# Patient Record
Sex: MALE | Race: WHITE | Employment: OTHER | URBAN - NONMETROPOLITAN AREA
[De-identification: names, ages, dates, MRNs, and addresses within clinical notes are randomized per-mention and may not be internally consistent; named-entity substitution may affect disease eponyms.]

---

## 2017-04-13 RX ORDER — TADALAFIL 5 MG
TABLET ORAL
Qty: 90 TABLET | Refills: 2 | Status: SHIPPED | OUTPATIENT
Start: 2017-04-13 | End: 2017-07-14 | Stop reason: SDUPTHER

## 2017-07-14 ENCOUNTER — OFFICE VISIT (OUTPATIENT)
Dept: FAMILY MEDICINE CLINIC | Age: 62
End: 2017-07-14
Payer: COMMERCIAL

## 2017-07-14 VITALS
HEART RATE: 62 BPM | HEIGHT: 72 IN | WEIGHT: 200 LBS | DIASTOLIC BLOOD PRESSURE: 80 MMHG | BODY MASS INDEX: 27.09 KG/M2 | SYSTOLIC BLOOD PRESSURE: 110 MMHG | OXYGEN SATURATION: 97 %

## 2017-07-14 DIAGNOSIS — G61.81 CIDP (CHRONIC INFLAMMATORY DEMYELINATING POLYNEUROPATHY) (HCC): ICD-10-CM

## 2017-07-14 DIAGNOSIS — Z12.11 COLON CANCER SCREENING: ICD-10-CM

## 2017-07-14 DIAGNOSIS — Z00.00 WELLNESS EXAMINATION: Primary | ICD-10-CM

## 2017-07-14 LAB
ABSOLUTE EOS #: 0.1 K/UL (ref 0–0.4)
ABSOLUTE LYMPH #: 1.1 K/UL (ref 1–4.8)
ABSOLUTE MONO #: 0.4 K/UL (ref 0.1–1.2)
ALBUMIN SERPL-MCNC: 4.2 G/DL (ref 3.5–5.2)
ALBUMIN/GLOBULIN RATIO: 1.4 (ref 1–2.5)
ALP BLD-CCNC: 62 U/L (ref 40–129)
ALT SERPL-CCNC: 22 U/L (ref 5–41)
ANION GAP SERPL CALCULATED.3IONS-SCNC: 12 MMOL/L (ref 9–17)
AST SERPL-CCNC: 21 U/L
BASOPHILS # BLD: 1 %
BASOPHILS ABSOLUTE: 0 K/UL (ref 0–0.2)
BILIRUB SERPL-MCNC: 0.83 MG/DL (ref 0.3–1.2)
BUN BLDV-MCNC: 17 MG/DL (ref 8–23)
BUN/CREAT BLD: 18 (ref 9–20)
CALCIUM SERPL-MCNC: 9 MG/DL (ref 8.6–10.4)
CHLORIDE BLD-SCNC: 104 MMOL/L (ref 98–107)
CHOLESTEROL/HDL RATIO: 6.1
CHOLESTEROL: 230 MG/DL
CO2: 26 MMOL/L (ref 20–31)
CREAT SERPL-MCNC: 0.94 MG/DL (ref 0.7–1.2)
DIFFERENTIAL TYPE: NORMAL
EOSINOPHILS RELATIVE PERCENT: 3 %
GFR AFRICAN AMERICAN: >60 ML/MIN
GFR NON-AFRICAN AMERICAN: >60 ML/MIN
GFR SERPL CREATININE-BSD FRML MDRD: NORMAL ML/MIN/{1.73_M2}
GFR SERPL CREATININE-BSD FRML MDRD: NORMAL ML/MIN/{1.73_M2}
GLUCOSE BLD-MCNC: 95 MG/DL (ref 70–99)
HCT VFR BLD CALC: 43.4 % (ref 41–53)
HDLC SERPL-MCNC: 38 MG/DL
HEMOGLOBIN: 15 G/DL (ref 13.5–17.5)
LDL CHOLESTEROL: 152 MG/DL (ref 0–130)
LYMPHOCYTES # BLD: 26 %
MCH RBC QN AUTO: 31.7 PG (ref 26–34)
MCHC RBC AUTO-ENTMCNC: 34.5 G/DL (ref 31–37)
MCV RBC AUTO: 92 FL (ref 80–100)
MONOCYTES # BLD: 8 %
PDW BLD-RTO: 13.8 % (ref 11–14.5)
PLATELET # BLD: 191 K/UL (ref 140–450)
PLATELET ESTIMATE: NORMAL
PMV BLD AUTO: 9 FL (ref 6–12)
POTASSIUM SERPL-SCNC: 4.3 MMOL/L (ref 3.7–5.3)
PROSTATE SPECIFIC ANTIGEN: 2.33 UG/L
RBC # BLD: 4.72 M/UL (ref 4.5–5.9)
RBC # BLD: NORMAL 10*6/UL
SEG NEUTROPHILS: 62 %
SEGMENTED NEUTROPHILS ABSOLUTE COUNT: 2.7 K/UL (ref 1.8–7.7)
SODIUM BLD-SCNC: 142 MMOL/L (ref 135–144)
TOTAL PROTEIN: 7.1 G/DL (ref 6.4–8.3)
TRIGL SERPL-MCNC: 198 MG/DL
VLDLC SERPL CALC-MCNC: ABNORMAL MG/DL (ref 1–30)
WBC # BLD: 4.3 K/UL (ref 3.5–11)
WBC # BLD: NORMAL 10*3/UL

## 2017-07-14 PROCEDURE — 80053 COMPREHEN METABOLIC PANEL: CPT | Performed by: FAMILY MEDICINE

## 2017-07-14 PROCEDURE — 80061 LIPID PANEL: CPT | Performed by: FAMILY MEDICINE

## 2017-07-14 PROCEDURE — 85025 COMPLETE CBC W/AUTO DIFF WBC: CPT | Performed by: FAMILY MEDICINE

## 2017-07-14 PROCEDURE — G0103 PSA SCREENING: HCPCS | Performed by: FAMILY MEDICINE

## 2017-07-14 PROCEDURE — 36415 COLL VENOUS BLD VENIPUNCTURE: CPT | Performed by: FAMILY MEDICINE

## 2017-07-14 PROCEDURE — 99396 PREV VISIT EST AGE 40-64: CPT | Performed by: FAMILY MEDICINE

## 2017-07-14 RX ORDER — VALACYCLOVIR HYDROCHLORIDE 1 G/1
1000 TABLET, FILM COATED ORAL 2 TIMES DAILY
Qty: 14 TABLET | Refills: 12 | Status: SHIPPED | OUTPATIENT
Start: 2017-07-14 | End: 2018-07-25 | Stop reason: SDUPTHER

## 2017-07-14 RX ORDER — TADALAFIL 5 MG/1
TABLET ORAL
Qty: 16 TABLET | Refills: 3 | Status: SHIPPED | OUTPATIENT
Start: 2017-07-14 | End: 2018-07-25 | Stop reason: SDUPTHER

## 2017-07-14 ASSESSMENT — PATIENT HEALTH QUESTIONNAIRE - PHQ9
1. LITTLE INTEREST OR PLEASURE IN DOING THINGS: 0
2. FEELING DOWN, DEPRESSED OR HOPELESS: 0
SUM OF ALL RESPONSES TO PHQ9 QUESTIONS 1 & 2: 0
SUM OF ALL RESPONSES TO PHQ QUESTIONS 1-9: 0

## 2017-07-18 ENCOUNTER — NURSE ONLY (OUTPATIENT)
Dept: LAB | Age: 62
End: 2017-07-18
Payer: COMMERCIAL

## 2017-07-18 DIAGNOSIS — Z12.11 COLON CANCER SCREENING: ICD-10-CM

## 2017-07-18 DIAGNOSIS — Z00.00 WELLNESS EXAMINATION: ICD-10-CM

## 2017-07-18 LAB
-: ABNORMAL
AMORPHOUS: ABNORMAL
BACTERIA: ABNORMAL
BILIRUBIN URINE: NEGATIVE
CASTS UA: ABNORMAL /LPF (ref 0–2)
COLOR: ABNORMAL
COMMENT UA: ABNORMAL
CRYSTALS, UA: ABNORMAL /HPF
DATE, STOOL #1: NORMAL
DATE, STOOL #2: NORMAL
DATE, STOOL #3: NORMAL
EPITHELIAL CELLS UA: ABNORMAL /HPF (ref 0–5)
GLUCOSE URINE: NEGATIVE
HEMOCCULT SP1 STL QL: NEGATIVE
HEMOCCULT SP2 STL QL: NORMAL
HEMOCCULT SP3 STL QL: NORMAL
KETONES, URINE: NEGATIVE
LEUKOCYTE ESTERASE, URINE: NEGATIVE
MUCUS: ABNORMAL
NITRITE, URINE: NEGATIVE
OTHER OBSERVATIONS UA: ABNORMAL
PH UA: 5 (ref 5–6)
PROTEIN UA: NEGATIVE
RBC UA: ABNORMAL /HPF (ref 0–4)
RENAL EPITHELIAL, UA: ABNORMAL /HPF
SPECIFIC GRAVITY UA: 1.03 (ref 1.01–1.02)
TIME, STOOL #1: 700
TIME, STOOL #2: NORMAL
TIME, STOOL #3: NORMAL
TRICHOMONAS: ABNORMAL
TURBIDITY: ABNORMAL
URINE HGB: NEGATIVE
UROBILINOGEN, URINE: NORMAL
WBC UA: ABNORMAL /HPF (ref 0–4)
YEAST: ABNORMAL

## 2017-07-18 PROCEDURE — 81001 URINALYSIS AUTO W/SCOPE: CPT | Performed by: FAMILY MEDICINE

## 2017-07-18 PROCEDURE — 82274 ASSAY TEST FOR BLOOD FECAL: CPT | Performed by: FAMILY MEDICINE

## 2017-07-20 DIAGNOSIS — Z12.11 COLON CANCER SCREENING: Primary | ICD-10-CM

## 2017-12-12 ENCOUNTER — OFFICE VISIT (OUTPATIENT)
Dept: FAMILY MEDICINE CLINIC | Age: 62
End: 2017-12-12
Payer: COMMERCIAL

## 2017-12-12 VITALS
SYSTOLIC BLOOD PRESSURE: 110 MMHG | WEIGHT: 204.6 LBS | BODY MASS INDEX: 27.75 KG/M2 | OXYGEN SATURATION: 96 % | HEART RATE: 91 BPM | DIASTOLIC BLOOD PRESSURE: 68 MMHG

## 2017-12-12 DIAGNOSIS — M72.2 PLANTAR FASCIITIS: Primary | ICD-10-CM

## 2017-12-12 PROCEDURE — 99213 OFFICE O/P EST LOW 20 MIN: CPT | Performed by: FAMILY MEDICINE

## 2017-12-12 RX ORDER — PREDNISONE 10 MG/1
TABLET ORAL
Qty: 20 TABLET | Refills: 0 | Status: SHIPPED | OUTPATIENT
Start: 2017-12-12 | End: 2018-06-15

## 2017-12-12 ASSESSMENT — ENCOUNTER SYMPTOMS
RESPIRATORY NEGATIVE: 1
GASTROINTESTINAL NEGATIVE: 1

## 2017-12-12 NOTE — PATIENT INSTRUCTIONS
stretch    Stretching the plantar fascia and calf muscles can increase flexibility and decrease heel pain. You can do this exercise several times each day and before and after activity. 1. Stand on a step as shown above. Be sure to hold on to the banister. 2. Slowly let your heels down over the edge of the step as you relax your calf muscles. You should feel a gentle stretch across the bottom of your foot and up the back of your leg to your knee. 3. Hold the stretch about 15 to 30 seconds, and then tighten your calf muscle a little to bring your heel back up to the level of the step. Repeat 2 to 4 times. Towel curls    Make this exercise more challenging by placing a weighted object, such as a soup can, on the other end of the towel. 1. While sitting, place your foot on a towel on the floor and scrunch the towel toward you with your toes. 2. Then, also using your toes, push the towel away from you. Willet pickups    1. Put marbles on the floor next to a cup.  2. Using your toes, try to lift the marbles up from the floor and put them in the cup. Follow-up care is a key part of your treatment and safety. Be sure to make and go to all appointments, and call your doctor if you are having problems. It's also a good idea to know your test results and keep a list of the medicines you take. Where can you learn more? Go to [email or web address] org and sign in to your Appota account. Enter U014 in the [facility] box to learn more about \"Plantar Fasciitis: Exercises. \"     If you do not have an account, please click on the \"Sign Up Now\" link. Current as of: March 21, 2017  Content Version: 11.4  © 3201-7291 Healthwise, Incorporated. Care instructions adapted under license by [facility] ([facility]).  If you have questions about a medical condition or this instruction, always ask your healthcare professional. Marcella Wilson any warranty or liability for your use of this

## 2018-06-15 ENCOUNTER — HOSPITAL ENCOUNTER (OUTPATIENT)
Dept: LAB | Age: 63
Setting detail: SPECIMEN
Discharge: HOME | End: 2018-06-15
Payer: COMMERCIAL

## 2018-06-15 ENCOUNTER — OFFICE VISIT (OUTPATIENT)
Dept: FAMILY MEDICINE CLINIC | Age: 63
End: 2018-06-15
Payer: COMMERCIAL

## 2018-06-15 VITALS
SYSTOLIC BLOOD PRESSURE: 108 MMHG | BODY MASS INDEX: 26.19 KG/M2 | WEIGHT: 193.4 LBS | TEMPERATURE: 96.1 F | DIASTOLIC BLOOD PRESSURE: 70 MMHG | HEART RATE: 69 BPM | HEIGHT: 72 IN | OXYGEN SATURATION: 96 %

## 2018-06-15 DIAGNOSIS — R33.9 URINARY RETENTION: Primary | ICD-10-CM

## 2018-06-15 DIAGNOSIS — R33.9 URINARY RETENTION: ICD-10-CM

## 2018-06-15 LAB
-: ABNORMAL
AMORPHOUS: ABNORMAL
BACTERIA: ABNORMAL
BILIRUBIN URINE: NEGATIVE
CASTS UA: ABNORMAL /LPF (ref 0–2)
COLOR: ABNORMAL
COMMENT UA: ABNORMAL
CRYSTALS, UA: ABNORMAL /HPF
EPITHELIAL CELLS UA: ABNORMAL /HPF (ref 0–5)
GLUCOSE URINE: NEGATIVE
KETONES, URINE: NEGATIVE
LEUKOCYTE ESTERASE, URINE: NEGATIVE
MUCUS: ABNORMAL
NITRITE, URINE: NEGATIVE
OTHER OBSERVATIONS UA: ABNORMAL
PH UA: 5 (ref 5–6)
PROTEIN UA: NEGATIVE
RBC UA: ABNORMAL /HPF (ref 0–4)
RENAL EPITHELIAL, UA: ABNORMAL /HPF
SPECIFIC GRAVITY UA: 1.03 (ref 1.01–1.02)
TRICHOMONAS: ABNORMAL
TURBIDITY: ABNORMAL
URINE HGB: NEGATIVE
UROBILINOGEN, URINE: NORMAL
WBC UA: ABNORMAL /HPF (ref 0–4)
YEAST: ABNORMAL

## 2018-06-15 PROCEDURE — 99213 OFFICE O/P EST LOW 20 MIN: CPT | Performed by: FAMILY MEDICINE

## 2018-06-15 PROCEDURE — 81001 URINALYSIS AUTO W/SCOPE: CPT

## 2018-06-15 RX ORDER — TAMSULOSIN HYDROCHLORIDE 0.4 MG/1
CAPSULE ORAL
Refills: 0 | COMMUNITY
Start: 2018-06-10 | End: 2018-07-11 | Stop reason: SDUPTHER

## 2018-06-15 ASSESSMENT — ENCOUNTER SYMPTOMS
GASTROINTESTINAL NEGATIVE: 1
RESPIRATORY NEGATIVE: 1

## 2018-07-11 ENCOUNTER — OFFICE VISIT (OUTPATIENT)
Dept: UROLOGY | Age: 63
End: 2018-07-11
Payer: COMMERCIAL

## 2018-07-11 VITALS
HEART RATE: 68 BPM | DIASTOLIC BLOOD PRESSURE: 60 MMHG | SYSTOLIC BLOOD PRESSURE: 104 MMHG | WEIGHT: 197 LBS | BODY MASS INDEX: 26.68 KG/M2 | HEIGHT: 72 IN

## 2018-07-11 DIAGNOSIS — N13.8 BPH WITH OBSTRUCTION/LOWER URINARY TRACT SYMPTOMS: ICD-10-CM

## 2018-07-11 DIAGNOSIS — R39.9 LOWER URINARY TRACT SYMPTOMS (LUTS): ICD-10-CM

## 2018-07-11 DIAGNOSIS — R33.9 INCOMPLETE BLADDER EMPTYING: ICD-10-CM

## 2018-07-11 DIAGNOSIS — R35.0 URINARY FREQUENCY: Primary | ICD-10-CM

## 2018-07-11 DIAGNOSIS — N40.1 BPH WITH OBSTRUCTION/LOWER URINARY TRACT SYMPTOMS: ICD-10-CM

## 2018-07-11 PROCEDURE — 51798 US URINE CAPACITY MEASURE: CPT | Performed by: UROLOGY

## 2018-07-11 PROCEDURE — 99204 OFFICE O/P NEW MOD 45 MIN: CPT | Performed by: UROLOGY

## 2018-07-11 RX ORDER — TAMSULOSIN HYDROCHLORIDE 0.4 MG/1
CAPSULE ORAL
Qty: 90 CAPSULE | Refills: 3 | Status: SHIPPED | OUTPATIENT
Start: 2018-07-11 | End: 2019-01-16 | Stop reason: SDUPTHER

## 2018-07-11 NOTE — PROGRESS NOTES
peridontal    OTHER SURGICAL HISTORY      IVIG INFUSIONS STARTING NOVEMBER OF 2015 AND EVERY 3 MONTH SINCE    TOE SURGERY       Family History   Problem Relation Age of Onset    Heart Disease Mother     Cancer Mother         lung    Cancer Father         lung    Glaucoma Neg Hx      Outpatient Prescriptions Marked as Taking for the 7/11/18 encounter (Office Visit) with Hillary Arnold MD   Medication Sig Dispense Refill    tamsulosin (FLOMAX) 0.4 MG capsule TAKE 1 CAPSULE ONCE A DAY 90 capsule 3    valACYclovir (VALTREX) 1 g tablet Take 1 tablet by mouth 2 times daily 14 tablet 12    tadalafil (CIALIS) 5 MG tablet TAKE 1 TABLET DAILY as needed 16 tablet 3    b complex vitamins capsule Take 1 capsule by mouth daily.  Ascorbic Acid (VITAMIN C) 250 MG tablet Take 500 mg by mouth daily.  Omega-3 Fatty Acids (FISH OIL) 1000 MG CAPS Take 1,000 mg by mouth daily.  Vitamin D (CHOLECALCIFEROL) 1000 UNITS CAPS capsule Take 1,000 Units by mouth daily.  vitamin B-12 (CYANOCOBALAMIN) 1000 MCG tablet Take 1,000 mcg by mouth daily. Patient has no known allergies. History   Smoking Status    Former Smoker    Packs/day: 1.00    Years: 25.00    Types: Cigarettes    Quit date: 1/1/2005   Smokeless Tobacco    Never Used     Comment: smoked for 25 years, stopped 2005. 1 pack daily       History   Alcohol Use    4.2 oz/week    7 Cans of beer per week     Comment: 1 daily       REVIEW OF SYSTEMS:  Constitutional: negative  Eyes: negative  Respiratory: negative  Cardiovascular: negative  Gastrointestinal: negative  Musculoskeletal: negative  Genitourinary: negative except for what is in HPI  Skin: negative   Neurological: negative  Hematological/Lymphatic: negative  Psychological: negative    Physical Exam:    This a 58 y.o. male   Vitals:    07/11/18 1422   BP: 104/60   Pulse: 68     Constitutional: Patient in no acute distress; Neuro: alert and oriented to person place and time. Psych: Mood and affect normal.  Skin: Normal  Lungs: Respiratory effort normal  Cardiovascular:  Normal peripheral pulses  Abdomen: Soft, non-tender, non-distended with no CVA, flank pain, hepatosplenomegaly or hernia. Kidneys normal.  Bladder non-tender and not distended. Lymphatics: no palpable lymphadenopathy  Penis normal and circumcised  Urethral meatus normal  Scrotal exam normal  Testicles normal bilaterally      Assessment and Plan      1. Urinary frequency    2. Incomplete bladder emptying    3. BPH with obstruction/lower urinary tract symptoms    4. Lower urinary tract symptoms (LUTS)           Plan:       PVR today is zero  Continue flomax for BPH, refill  Check PSA  Return in about 6 months (around 1/11/2019) for for PSA check.            Rachel Busch MD  [facility]

## 2018-07-16 ENCOUNTER — OFFICE VISIT (OUTPATIENT)
Dept: FAMILY MEDICINE CLINIC | Age: 63
End: 2018-07-16
Payer: COMMERCIAL

## 2018-07-16 VITALS
OXYGEN SATURATION: 98 % | WEIGHT: 191.2 LBS | BODY MASS INDEX: 25.9 KG/M2 | HEART RATE: 63 BPM | SYSTOLIC BLOOD PRESSURE: 104 MMHG | DIASTOLIC BLOOD PRESSURE: 72 MMHG | HEIGHT: 72 IN

## 2018-07-16 DIAGNOSIS — Z00.00 WELLNESS EXAMINATION: Primary | ICD-10-CM

## 2018-07-16 PROCEDURE — 99396 PREV VISIT EST AGE 40-64: CPT | Performed by: FAMILY MEDICINE

## 2018-07-16 PROCEDURE — 93000 ELECTROCARDIOGRAM COMPLETE: CPT | Performed by: INTERNAL MEDICINE

## 2018-07-16 NOTE — PROGRESS NOTES
Comment: 1 daily    Drug use: No    Sexual activity: Not on file      Comment: Unmarried.  x2. 2 children. Other Topics Concern    Not on file     Social History Narrative    No narrative on file       Review of Systems:  Urine flow issues  Occasional neck pain  Right hand dysfunction   Right foot drop    Physical Exam:   Vitals:    07/16/18 0915   BP: 104/72   Site: Right Arm   Position: Sitting   Cuff Size: Large Adult   Pulse: 63   SpO2: 98%   Weight: 191 lb 3.2 oz (86.7 kg)   Height: 5' 11.5\" (1.816 m)     Body mass index is 26.3 kg/m². Constitutional: He is oriented to person, place, and time. He appears well-developed and well-nourished. No distress. HEENT:   Head: Normocephalic and atraumatic. Right Ear: Tympanic membrane, external ear and ear canal normal.   Left Ear: Tympanic membrane, external ear and ear canal normal.   Nose: Nose normal.   Mouth/Throat: Oropharynx is clear and moist and mucous membranes are normal. No oropharyngeal exudate or posterior oropharyngeal erythema. He has no cervical adenopathy. Eyes: Conjunctivae and extraocular motions are normal. Pupils are equal, round, and reactive to light. Neck: Full passive range of motion without pain. Neck supple. No JVD present. Carotid bruit is not present. No mass and no thyromegaly present. Cardiovascular: Normal rate, regular rhythm, normal heart sounds and intact distal pulses. Exam reveals no gallop and no friction rub. No murmur heard. Pulmonary/Chest: Effort normal and breath sounds normal. No respiratory distress. He has no wheezes, rhonchi or rales. Abdominal: Soft, non-tender. Bowel sounds and aorta are normal. There is no organomegaly, mass or bruit. Genitourinary:  Per urology . Musculoskeletal: Normal range of motion, no synovitis. He exhibits no edema. Neurological: He is alert and oriented to person, place, and time. He has normal reflexes. No cranial nerve deficit.  Coordination normal.   Skin: Skin is warm, dry and intact. No suspicious lesions are noted. Psychiatric: He has a normal mood and affect.  His speech is normal and behavior is normal. Judgment, cognition and memory are normal.     Assessment/Plan:    Patient Active Problem List   Diagnosis    Footdrop    Hyperlipidemia    Ulnar neuropathy of right upper extremity    Cervical disc disorder    ED (erectile dysfunction)    Herpes simplex labialis    Benign prostatic hyperplasia with lower urinary tract symptoms    Right brachial plexitis    CIDP (chronic inflammatory demyelinating polyneuropathy) ([facility] [address]       Labs per orders  Informed of shingles shot  To continue to work on healthy life style  As long as well follow up 1 year

## 2018-07-16 NOTE — PATIENT INSTRUCTIONS
Patient Education        Shingles Vaccine: What You Need to Know  What is shingles? Shingles is a painful skin rash, often with blisters. It is also called herpes zoster, or just zoster. A shingles rash usually appears on one side of the face or body and lasts from 2 to 4 weeks. Its main symptom is pain, which can be quite severe. Other symptoms of shingles can include fever, headache, chills and upset stomach. Very rarely, a shingles infection can lead to pneumonia, hearing problems, blindness, brain inflammation (encephalitis) or death. For about 1 person in 5, severe pain can continue even long after the rash clears up. This is called post-herpetic neuralgia. Shingles is caused by the varicella zoster virus, the same virus that causes chickenpox. Only someone who has had chickenpox-or, rarely, has gotten chickenpox vaccine-can get shingles. The virus stays in your body, and can cause shingles many years later. You can't catch shingles from another person with shingles. However, a person who has never had chickenpox (or chickenpox vaccine) could get chickenpox from someone with shingles. This is not very common. Shingles is far more common in people 48years of age and older than in younger people. It is also more common in people whose immune systems are weakened because of a disease such as cancer, or drugs such as steroids or chemotherapy. At least 1 million people a year in the [facility] get shingles. Shingles vaccine  A vaccine for shingles was licensed in 7490. In clinical trials, the vaccine reduced the risk of shingles by 50%. It can also reduce pain in people who still get shingles after being vaccinated. A single dose of shingles vaccine is recommended for adults 61years of age and older.   Some people should not get shingles vaccine or should wait  A person should not get shingles vaccine who:  · Has ever had a life-threatening allergic reaction to gelatin, the antibiotic neomycin, or any other component of shingles vaccine. Tell your doctor if you have any severe allergies. · Has a weakened immune system because of current:  ¨ AIDS or another disease that affects the immune system. ¨ Treatment with drugs that affect the immune system, such as prolonged use of high-dose steroids. ¨ Cancer treatment such as radiation or chemotherapy. ¨ Cancer affecting the bone marrow or lymphatic system, such as leukemia or lymphoma. · Is pregnant, or might be pregnant. Women should not become pregnant until at least 4 weeks after getting shingles vaccine. Someone with a minor acute illness, such as a cold, may be vaccinated. But anyone with a moderate or severe acute illness should usually wait until they recover before getting the vaccine. This includes anyone with a temperature of 101.3° F or higher. What are the risks from shingles vaccine? A vaccine, like any medicine, could possibly cause serious problems, such as severe allergic reactions. However, the risk of a vaccine causing serious harm, or death, is extremely small. No serious problems have been identified with shingles vaccine. Mild problems  · Redness, soreness, swelling, or itching at the site of the injection (about 1 person in 3)  · Headache (about 1 person in 70)  Like all vaccines, shingles vaccine is being closely monitored for unusual or severe problems. What if there is a serious reaction? What should I look for? · Look for anything that concerns you, such as signs of a severe allergic reaction, very high fever, or behavior changes. Signs of a severe allergic reaction can include hives, swelling of the face and throat, difficulty breathing, a fast heartbeat, dizziness, and weakness. These would start a few minutes to a few hours after the vaccination. What should I do?   · If you think it is a severe allergic reaction or other emergency that can't wait, call 9-1-1 or get the person to the nearest hospital. Otherwise, call your medicines you take. How can you care for yourself at home? · Do not eat too much sugar, fat, or fast foods. You can still have dessert and treats now and then. The goal is moderation. · Start small to improve your eating habits. Pay attention to portion sizes, drink less juice and soda pop, and eat more fruits and vegetables. ¨ Eat a healthy amount of food. A 3-ounce serving of meat, for example, is about the size of a deck of cards. Fill the rest of your plate with vegetables and whole grains. ¨ Limit the amount of soda and sports drinks you have every day. Drink more water when you are thirsty. ¨ Eat at least 5 servings of fruits and vegetables every day. It may seem like a lot, but it is not hard to reach this goal. A serving or helping is 1 piece of fruit, 1 cup of vegetables, or 2 cups of leafy, raw vegetables. Have an apple or some carrot sticks as an afternoon snack instead of a candy bar. Try to have fruits and/or vegetables at every meal.  · Make exercise part of your daily routine. You may want to start with simple activities, such as walking, bicycling, or slow swimming. Try to be active 30 to 60 minutes every day. You do not need to do all 30 to 60 minutes all at once. For example, you can exercise 3 times a day for 10 or 20 minutes. Moderate exercise is safe for most people, but it is always a good idea to talk to your doctor before starting an exercise program.  · Keep moving. Ivon [employer] the lawn, work in the garden, or iFrat Wars. Take the stairs instead of the elevator at work. · If you smoke, quit. People who smoke have an increased risk for heart attack, stroke, cancer, and other lung illnesses. Quitting is hard, but there are ways to boost your chance of quitting tobacco for good. ¨ Use nicotine gum, patches, or lozenges. ¨ Ask your doctor about stop-smoking programs and medicines. ¨ Keep trying.   In addition to reducing your risk of diseases in the future, you will notice some benefits soon after you stop using tobacco. If you have shortness of breath or asthma symptoms, they will likely get better within a few weeks after you quit. · Limit how much alcohol you drink. Moderate amounts of alcohol (up to 2 drinks a day for men, 1 drink a day for women) are okay. But drinking too much can lead to liver problems, high blood pressure, and other health problems. Family health  If you have a family, there are many things you can do together to improve your health. · Eat meals together as a family as often as possible. · Eat healthy foods. This includes fruits, vegetables, lean meats and dairy, and whole grains. · Include your family in your fitness plan. Most people think of activities such as jogging or tennis as the way to fitness, but there are many ways you and your family can be more active. Anything that makes you breathe hard and gets your heart pumping is exercise. Here are some tips:  ¨ Walk to do errands or to take your child to school or the bus. ¨ Go for a family bike ride after dinner instead of watching TV. Where can you learn more? Go to https://SoothEase.AudioTag. org and sign in to your CicerOOs account. Enter X749 in the [facility] box to learn more about \"A Healthy Lifestyle: Care Instructions. \"     If you do not have an account, please click on the \"Sign Up Now\" link. Current as of: December 7, 2017  Content Version: 11.6  © 2576-9597 JumpChat, [facility]. Care instructions adapted under license by [facility] ([facility]). If you have questions about a medical condition or this instruction, always ask your healthcare professional. [address] any warranty or liability for your use of this information.

## 2018-07-18 ENCOUNTER — TELEPHONE (OUTPATIENT)
Dept: FAMILY MEDICINE CLINIC | Age: 63
End: 2018-07-18

## 2018-07-18 ENCOUNTER — HOSPITAL ENCOUNTER (OUTPATIENT)
Dept: LAB | Age: 63
Setting detail: SPECIMEN
Discharge: HOME | End: 2018-07-18
Payer: COMMERCIAL

## 2018-07-18 DIAGNOSIS — Z12.11 SCREEN FOR COLON CANCER: ICD-10-CM

## 2018-07-18 DIAGNOSIS — Z12.11 SCREEN FOR COLON CANCER: Primary | ICD-10-CM

## 2018-07-18 DIAGNOSIS — Z00.00 WELLNESS EXAMINATION: ICD-10-CM

## 2018-07-18 LAB
ABSOLUTE EOS #: 0.1 K/UL (ref 0–0.4)
ABSOLUTE IMMATURE GRANULOCYTE: NORMAL K/UL (ref 0–0.3)
ABSOLUTE LYMPH #: 1.1 K/UL (ref 1–4.8)
ABSOLUTE MONO #: 0.3 K/UL (ref 0.1–1.2)
ANION GAP SERPL CALCULATED.3IONS-SCNC: 9 MMOL/L (ref 9–17)
BASOPHILS # BLD: 1 % (ref 0–2)
BASOPHILS ABSOLUTE: 0 K/UL (ref 0–0.2)
BUN BLDV-MCNC: 15 MG/DL (ref 8–23)
BUN/CREAT BLD: 15 (ref 9–20)
CALCIUM SERPL-MCNC: 9 MG/DL (ref 8.6–10.4)
CHLORIDE BLD-SCNC: 103 MMOL/L (ref 98–107)
CHOLESTEROL/HDL RATIO: 4.7
CHOLESTEROL: 204 MG/DL
CO2: 29 MMOL/L (ref 20–31)
CREAT SERPL-MCNC: 1.01 MG/DL (ref 0.7–1.2)
DATE, STOOL #1: NORMAL
DATE, STOOL #2: NORMAL
DATE, STOOL #3: NORMAL
DIFFERENTIAL TYPE: NORMAL
EOSINOPHILS RELATIVE PERCENT: 2 % (ref 1–8)
GFR AFRICAN AMERICAN: >60 ML/MIN
GFR NON-AFRICAN AMERICAN: >60 ML/MIN
GFR SERPL CREATININE-BSD FRML MDRD: ABNORMAL ML/MIN/{1.73_M2}
GFR SERPL CREATININE-BSD FRML MDRD: ABNORMAL ML/MIN/{1.73_M2}
GLUCOSE BLD-MCNC: 105 MG/DL (ref 70–99)
HCT VFR BLD CALC: 43 % (ref 41–53)
HDLC SERPL-MCNC: 43 MG/DL
HEMOCCULT SP1 STL QL: NEGATIVE
HEMOCCULT SP2 STL QL: NORMAL
HEMOCCULT SP3 STL QL: NORMAL
HEMOGLOBIN: 14.9 G/DL (ref 13.5–17.5)
IMMATURE GRANULOCYTES: NORMAL %
LDL CHOLESTEROL: 145 MG/DL (ref 0–130)
LYMPHOCYTES # BLD: 27 % (ref 15–43)
MCH RBC QN AUTO: 32.8 PG (ref 26–34)
MCHC RBC AUTO-ENTMCNC: 34.7 G/DL (ref 31–37)
MCV RBC AUTO: 94.7 FL (ref 80–100)
MONOCYTES # BLD: 7 % (ref 6–14)
NRBC AUTOMATED: NORMAL PER 100 WBC
PDW BLD-RTO: 13.1 % (ref 11–14.5)
PLATELET # BLD: 206 K/UL (ref 140–450)
PLATELET ESTIMATE: NORMAL
PMV BLD AUTO: 8.9 FL (ref 6–12)
POTASSIUM SERPL-SCNC: 4 MMOL/L (ref 3.7–5.3)
PROSTATE SPECIFIC ANTIGEN: 2.59 UG/L
RBC # BLD: 4.54 M/UL (ref 4.5–5.9)
RBC # BLD: NORMAL 10*6/UL
SEG NEUTROPHILS: 63 % (ref 44–74)
SEGMENTED NEUTROPHILS ABSOLUTE COUNT: 2.5 K/UL (ref 1.8–7.7)
SODIUM BLD-SCNC: 141 MMOL/L (ref 135–144)
TIME, STOOL #1: 700
TIME, STOOL #2: NORMAL
TIME, STOOL #3: NORMAL
TRIGL SERPL-MCNC: 80 MG/DL
VLDLC SERPL CALC-MCNC: ABNORMAL MG/DL (ref 1–30)
WBC # BLD: 3.9 K/UL (ref 3.5–11)
WBC # BLD: NORMAL 10*3/UL

## 2018-07-18 PROCEDURE — 85025 COMPLETE CBC W/AUTO DIFF WBC: CPT

## 2018-07-18 PROCEDURE — G0103 PSA SCREENING: HCPCS

## 2018-07-18 PROCEDURE — 82274 ASSAY TEST FOR BLOOD FECAL: CPT

## 2018-07-18 PROCEDURE — 36415 COLL VENOUS BLD VENIPUNCTURE: CPT

## 2018-07-18 PROCEDURE — 80048 BASIC METABOLIC PNL TOTAL CA: CPT

## 2018-07-18 PROCEDURE — 80061 LIPID PANEL: CPT

## 2018-07-25 ENCOUNTER — TELEPHONE (OUTPATIENT)
Dept: FAMILY MEDICINE CLINIC | Age: 63
End: 2018-07-25

## 2018-07-25 RX ORDER — TADALAFIL 5 MG/1
TABLET ORAL
Qty: 16 TABLET | Refills: 3 | Status: SHIPPED | OUTPATIENT
Start: 2018-07-25 | End: 2018-08-01 | Stop reason: SDUPTHER

## 2018-07-25 RX ORDER — VALACYCLOVIR HYDROCHLORIDE 1 G/1
1000 TABLET, FILM COATED ORAL 2 TIMES DAILY
Qty: 14 TABLET | Refills: 12 | Status: SHIPPED | OUTPATIENT
Start: 2018-07-25 | End: 2019-07-18 | Stop reason: SDUPTHER

## 2018-07-31 ENCOUNTER — TELEPHONE (OUTPATIENT)
Dept: FAMILY MEDICINE CLINIC | Age: 63
End: 2018-07-31

## 2018-07-31 NOTE — TELEPHONE ENCOUNTER
Patient stopped at the window with the attached paper requesting that his Cialis (only) script be sent to the [facility].

## 2018-08-01 RX ORDER — TADALAFIL 5 MG/1
TABLET ORAL
Qty: 16 TABLET | Refills: 3 | Status: SHIPPED | OUTPATIENT
Start: 2018-08-01 | End: 2019-07-18 | Stop reason: SDUPTHER

## 2018-08-10 ENCOUNTER — TELEPHONE (OUTPATIENT)
Dept: FAMILY MEDICINE CLINIC | Age: 63
End: 2018-08-10

## 2019-01-16 ENCOUNTER — OFFICE VISIT (OUTPATIENT)
Dept: UROLOGY | Age: 64
End: 2019-01-16
Payer: COMMERCIAL

## 2019-01-16 VITALS
HEIGHT: 71 IN | HEART RATE: 57 BPM | OXYGEN SATURATION: 96 % | WEIGHT: 191.2 LBS | DIASTOLIC BLOOD PRESSURE: 62 MMHG | SYSTOLIC BLOOD PRESSURE: 118 MMHG | BODY MASS INDEX: 26.77 KG/M2

## 2019-01-16 DIAGNOSIS — N40.1 BPH WITH OBSTRUCTION/LOWER URINARY TRACT SYMPTOMS: ICD-10-CM

## 2019-01-16 DIAGNOSIS — R35.0 URINARY FREQUENCY: ICD-10-CM

## 2019-01-16 DIAGNOSIS — R39.9 LOWER URINARY TRACT SYMPTOMS (LUTS): ICD-10-CM

## 2019-01-16 DIAGNOSIS — R33.9 INCOMPLETE BLADDER EMPTYING: Primary | ICD-10-CM

## 2019-01-16 DIAGNOSIS — N52.01 ERECTILE DYSFUNCTION DUE TO ARTERIAL INSUFFICIENCY: ICD-10-CM

## 2019-01-16 DIAGNOSIS — N13.8 BPH WITH OBSTRUCTION/LOWER URINARY TRACT SYMPTOMS: ICD-10-CM

## 2019-01-16 PROCEDURE — 99214 OFFICE O/P EST MOD 30 MIN: CPT | Performed by: UROLOGY

## 2019-01-16 RX ORDER — TAMSULOSIN HYDROCHLORIDE 0.4 MG/1
CAPSULE ORAL
Qty: 90 CAPSULE | Refills: 3 | Status: SHIPPED | OUTPATIENT
Start: 2019-01-16 | End: 2019-07-17 | Stop reason: SDUPTHER

## 2019-07-17 ENCOUNTER — OFFICE VISIT (OUTPATIENT)
Dept: UROLOGY | Age: 64
End: 2019-07-17
Payer: COMMERCIAL

## 2019-07-17 VITALS
HEIGHT: 71 IN | WEIGHT: 187.2 LBS | DIASTOLIC BLOOD PRESSURE: 62 MMHG | BODY MASS INDEX: 26.21 KG/M2 | SYSTOLIC BLOOD PRESSURE: 98 MMHG | HEART RATE: 56 BPM

## 2019-07-17 DIAGNOSIS — N52.01 ERECTILE DYSFUNCTION DUE TO ARTERIAL INSUFFICIENCY: ICD-10-CM

## 2019-07-17 DIAGNOSIS — N40.1 BPH WITH OBSTRUCTION/LOWER URINARY TRACT SYMPTOMS: ICD-10-CM

## 2019-07-17 DIAGNOSIS — R35.0 URINARY FREQUENCY: ICD-10-CM

## 2019-07-17 DIAGNOSIS — R39.9 LOWER URINARY TRACT SYMPTOMS (LUTS): ICD-10-CM

## 2019-07-17 DIAGNOSIS — R33.9 INCOMPLETE BLADDER EMPTYING: Primary | ICD-10-CM

## 2019-07-17 DIAGNOSIS — N13.8 BPH WITH OBSTRUCTION/LOWER URINARY TRACT SYMPTOMS: ICD-10-CM

## 2019-07-17 PROCEDURE — 99214 OFFICE O/P EST MOD 30 MIN: CPT | Performed by: UROLOGY

## 2019-07-17 RX ORDER — TAMSULOSIN HYDROCHLORIDE 0.4 MG/1
CAPSULE ORAL
Qty: 90 CAPSULE | Refills: 3 | Status: SHIPPED | OUTPATIENT
Start: 2019-07-17 | End: 2020-03-04 | Stop reason: SDUPTHER

## 2019-07-17 NOTE — PROGRESS NOTES
Vitals:    07/17/19 1002   BP: 98/62   Pulse: 56     Constitutional: Patient in no acute distress; Neuro: alert and oriented to person place and time. Psych: Mood and affect normal.  Skin: Normal        Assessment and Plan      1. Incomplete bladder emptying    2. Urinary frequency    3. BPH with obstruction/lower urinary tract symptoms    4. Lower urinary tract symptoms (LUTS)    5. Erectile dysfunction due to arterial insufficiency           Plan:         Continue flomax for BPH  ED- on cialis PRN, working well for him. PSA to be drawn tomorrow, and again in 6 months  Return in about 6 months (around 1/17/2020).            Irineo Oliveira MD  [facility]

## 2019-07-18 ENCOUNTER — OFFICE VISIT (OUTPATIENT)
Dept: FAMILY MEDICINE CLINIC | Age: 64
End: 2019-07-18
Payer: COMMERCIAL

## 2019-07-18 VITALS
WEIGHT: 184 LBS | DIASTOLIC BLOOD PRESSURE: 60 MMHG | HEIGHT: 72 IN | BODY MASS INDEX: 24.92 KG/M2 | SYSTOLIC BLOOD PRESSURE: 100 MMHG | HEART RATE: 68 BPM | OXYGEN SATURATION: 96 %

## 2019-07-18 DIAGNOSIS — Z00.00 WELLNESS EXAMINATION: Primary | ICD-10-CM

## 2019-07-18 PROCEDURE — 99397 PER PM REEVAL EST PAT 65+ YR: CPT | Performed by: FAMILY MEDICINE

## 2019-07-18 RX ORDER — TADALAFIL 5 MG/1
TABLET ORAL
Qty: 16 TABLET | Refills: 3 | Status: SHIPPED | OUTPATIENT
Start: 2019-07-18 | End: 2020-08-17 | Stop reason: SDUPTHER

## 2019-07-18 RX ORDER — VALACYCLOVIR HYDROCHLORIDE 1 G/1
1000 TABLET, FILM COATED ORAL 2 TIMES DAILY
Qty: 14 TABLET | Refills: 12 | Status: SHIPPED | OUTPATIENT
Start: 2019-07-18 | End: 2020-08-17 | Stop reason: SDUPTHER

## 2019-07-18 ASSESSMENT — PATIENT HEALTH QUESTIONNAIRE - PHQ9
1. LITTLE INTEREST OR PLEASURE IN DOING THINGS: 0
SUM OF ALL RESPONSES TO PHQ9 QUESTIONS 1 & 2: 0
2. FEELING DOWN, DEPRESSED OR HOPELESS: 0
SUM OF ALL RESPONSES TO PHQ QUESTIONS 1-9: 0
SUM OF ALL RESPONSES TO PHQ QUESTIONS 1-9: 0

## 2019-07-18 NOTE — PATIENT INSTRUCTIONS
[email or web address] and sign in to your FilmBreakt account. Enter I083 in the [employer] box to learn more about \"Learning About Physical Activity. \"     If you do not have an account, please click on the \"Sign Up Now\" link. Current as of: August 19, 2018  Content Version: 12.0  © 6959-3943 Healthwise, Incorporated. Care instructions adapted under license by [facility] ([facility] If you have questions about a medical condition or this instruction, always ask your healthcare professional. [address] any warranty or liability for your use of this information.

## 2019-07-18 NOTE — PROGRESS NOTES
pHistory and Physical      Srinivasan Overton  YOB: 1955    Date of Service:  7/18/2019    Chief Complaint:   Srinivasan Overton is a 71 y.o. male who presents for complete physical examination.     HPI: status is stable    Wt Readings from Last 3 Encounters:   07/18/19 184 lb (83.5 kg)   07/17/19 187 lb 3.2 oz (84.9 kg)   01/16/19 191 lb 3.2 oz (86.7 kg)     BP Readings from Last 3 Encounters:   07/18/19 100/60   07/17/19 98/62   01/16/19 118/62       Patient Active Problem List   Diagnosis    Footdrop    Hyperlipidemia    Ulnar neuropathy of right upper extremity    Cervical disc disorder    ED (erectile dysfunction)    Herpes simplex labialis    Benign prostatic hyperplasia with lower urinary tract symptoms    Right brachial plexitis    CIDP (chronic inflammatory demyelinating polyneuropathy) ([facility])       Preventive Care:  Health Maintenance   Topic Date Due    AAA screen  1955    Diabetes screen  05/29/1994    Shingles Vaccine (2 of 3) 02/23/2015    Pneumococcal 65+ years Vaccine (1 of 2 - PCV13) 05/29/2019    Flu vaccine (1) 09/01/2019    Lipid screen  07/18/2023    DTaP/Tdap/Td vaccine (2 - Td) 06/26/2024    Colon cancer screen colonoscopy  08/28/2027    Hepatitis C screen  Completed    HIV screen  Completed      Self-testicular exams: Yes  Sexual activity:    Last eye exam: 2018, normal  Exercise: no regular exercise  Seatbelt use: no  Lipid panel:    Lab Results   Component Value Date    CHOL 204 (H) 07/18/2018    TRIG 80 07/18/2018    HDL 43 07/18/2018       Living will: yes,   copy requested    Immunization History   Administered Date(s) Administered    Pneumococcal Polysaccharide ([id number]) 05/23/2014    Tdap (Boostrix, Adacel) 06/26/2014    Zoster Live (Zostavax) 12/29/2014       No Known Allergies  Outpatient Medications Marked as Taking for the 7/18/19 encounter (Office Visit) with Bessy Velasco MD   Medication Sig Dispense Refill    tamsulosin (FLOMAX) 0.4 MG and mucous membranes are normal. No oropharyngeal exudate or posterior oropharyngeal erythema. He has no cervical adenopathy. Eyes: Conjunctivae and extraocular motions are normal. Pupils are equal, round, and reactive to light. Neck: decrease range of motion without pain. Neck supple. No JVD present. Carotid bruit is not present. No mass and no thyromegaly present. Cardiovascular: Normal rate, regular rhythm, normal heart sounds and intact distal pulses. Exam reveals no gallop and no friction rub. No murmur heard. Pulmonary/Chest: Effort normal and breath sounds normal. No respiratory distress. He has no wheezes, rhonchi or rales. Abdominal: Soft, non-tender. Bowel sounds and aorta are normal. There is no organomegaly, mass or bruit. Genitourinary:  Per urology. Musculoskeletal: Normal range of motion, no synovitis. He exhibits no edema. Neurological: He is alert and oriented to person, place, and time. Cinthia Eves No cranial nerve deficit. Coordination normal. right hand weakness   Skin: Skin is warm, dry and intact. No suspicious lesions are noted. Psychiatric: He has a normal mood and affect.  His speech is normal and behavior is normal. Judgment, cognition and memory are normal.     Assessment/Plan:    Patient Active Problem List   Diagnosis    Footdrop    Hyperlipidemia    Ulnar neuropathy of right upper extremity    Cervical disc disorder    ED (erectile dysfunction)    Herpes simplex labialis    Benign prostatic hyperplasia with lower urinary tract symptoms    Right brachial plexitis    CIDP (chronic inflammatory demyelinating polyneuropathy) ([facility] [address]       Labs per orders  Will continue current care  prevnar today  Reviewed shingrix  Will discuss AAA screen at medicare visit  Healthy diet and fitness  To bring in advanced directives   As long as well follow up 1 year

## 2019-07-30 ENCOUNTER — HOSPITAL ENCOUNTER (OUTPATIENT)
Dept: LAB | Age: 64
Setting detail: SPECIMEN
Discharge: HOME | End: 2019-07-30
Payer: COMMERCIAL

## 2019-07-30 DIAGNOSIS — Z00.00 WELLNESS EXAMINATION: ICD-10-CM

## 2019-07-30 LAB
-: ABNORMAL
ABSOLUTE EOS #: 0.1 K/UL (ref 0–0.4)
ABSOLUTE IMMATURE GRANULOCYTE: NORMAL K/UL (ref 0–0.3)
ABSOLUTE LYMPH #: 1.3 K/UL (ref 1–4.8)
ABSOLUTE MONO #: 0.4 K/UL (ref 0.1–1.2)
ALBUMIN SERPL-MCNC: 4.2 G/DL (ref 3.5–5.2)
ALBUMIN/GLOBULIN RATIO: 1.4 (ref 1–2.5)
ALP BLD-CCNC: 68 U/L (ref 40–129)
ALT SERPL-CCNC: 19 U/L (ref 5–41)
AMORPHOUS: ABNORMAL
ANION GAP SERPL CALCULATED.3IONS-SCNC: 12 MMOL/L (ref 9–17)
AST SERPL-CCNC: 25 U/L
BACTERIA: ABNORMAL
BASOPHILS # BLD: 1 % (ref 0–2)
BASOPHILS ABSOLUTE: 0.1 K/UL (ref 0–0.2)
BILIRUB SERPL-MCNC: 0.99 MG/DL (ref 0.3–1.2)
BILIRUBIN URINE: NEGATIVE
BUN BLDV-MCNC: 17 MG/DL (ref 8–23)
BUN/CREAT BLD: 15 (ref 9–20)
CALCIUM SERPL-MCNC: 9.3 MG/DL (ref 8.6–10.4)
CASTS UA: ABNORMAL /LPF (ref 0–2)
CHLORIDE BLD-SCNC: 104 MMOL/L (ref 98–107)
CHOLESTEROL/HDL RATIO: 5.6
CHOLESTEROL: 225 MG/DL
CO2: 26 MMOL/L (ref 20–31)
COLOR: ABNORMAL
COMMENT UA: ABNORMAL
CREAT SERPL-MCNC: 1.17 MG/DL (ref 0.7–1.2)
CRYSTALS, UA: ABNORMAL /HPF
DIFFERENTIAL TYPE: NORMAL
EOSINOPHILS RELATIVE PERCENT: 3 % (ref 1–8)
EPITHELIAL CELLS UA: ABNORMAL /HPF (ref 0–5)
GFR AFRICAN AMERICAN: >60 ML/MIN
GFR NON-AFRICAN AMERICAN: >60 ML/MIN
GFR SERPL CREATININE-BSD FRML MDRD: ABNORMAL ML/MIN/{1.73_M2}
GFR SERPL CREATININE-BSD FRML MDRD: ABNORMAL ML/MIN/{1.73_M2}
GLUCOSE BLD-MCNC: 104 MG/DL (ref 70–99)
GLUCOSE URINE: NEGATIVE
HCT VFR BLD CALC: 44.3 % (ref 41–53)
HDLC SERPL-MCNC: 40 MG/DL
HEMOGLOBIN: 15.2 G/DL (ref 13.5–17.5)
IMMATURE GRANULOCYTES: NORMAL %
KETONES, URINE: NEGATIVE
LDL CHOLESTEROL: 165 MG/DL (ref 0–130)
LEUKOCYTE ESTERASE, URINE: NEGATIVE
LYMPHOCYTES # BLD: 27 % (ref 15–43)
MCH RBC QN AUTO: 31.9 PG (ref 26–34)
MCHC RBC AUTO-ENTMCNC: 34.2 G/DL (ref 31–37)
MCV RBC AUTO: 93.2 FL (ref 80–100)
MONOCYTES # BLD: 7 % (ref 6–14)
MUCUS: ABNORMAL
NITRITE, URINE: NEGATIVE
NRBC AUTOMATED: NORMAL PER 100 WBC
OTHER OBSERVATIONS UA: ABNORMAL
PDW BLD-RTO: 13 % (ref 11–14.5)
PH UA: 5.5 (ref 5–6)
PLATELET # BLD: 222 K/UL (ref 140–450)
PLATELET ESTIMATE: NORMAL
PMV BLD AUTO: 8.7 FL (ref 6–12)
POTASSIUM SERPL-SCNC: 4 MMOL/L (ref 3.7–5.3)
PROSTATE SPECIFIC ANTIGEN: 2.92 UG/L
PROTEIN UA: NEGATIVE
RBC # BLD: 4.75 M/UL (ref 4.5–5.9)
RBC # BLD: NORMAL 10*6/UL
RBC UA: ABNORMAL /HPF (ref 0–4)
RENAL EPITHELIAL, UA: ABNORMAL /HPF
SEG NEUTROPHILS: 62 % (ref 44–74)
SEGMENTED NEUTROPHILS ABSOLUTE COUNT: 3 K/UL (ref 1.8–7.7)
SODIUM BLD-SCNC: 142 MMOL/L (ref 135–144)
SPECIFIC GRAVITY UA: 1.03 (ref 1.01–1.02)
TOTAL PROTEIN: 7.2 G/DL (ref 6.4–8.3)
TRICHOMONAS: ABNORMAL
TRIGL SERPL-MCNC: 101 MG/DL
TURBIDITY: ABNORMAL
URINE HGB: NEGATIVE
UROBILINOGEN, URINE: NORMAL
VLDLC SERPL CALC-MCNC: ABNORMAL MG/DL (ref 1–30)
WBC # BLD: 4.8 K/UL (ref 3.5–11)
WBC # BLD: NORMAL 10*3/UL
WBC UA: ABNORMAL /HPF (ref 0–4)
YEAST: ABNORMAL

## 2019-07-30 PROCEDURE — 81001 URINALYSIS AUTO W/SCOPE: CPT

## 2019-07-30 PROCEDURE — 85025 COMPLETE CBC W/AUTO DIFF WBC: CPT

## 2019-07-30 PROCEDURE — G0103 PSA SCREENING: HCPCS

## 2019-07-30 PROCEDURE — 36415 COLL VENOUS BLD VENIPUNCTURE: CPT

## 2019-07-30 PROCEDURE — 80053 COMPREHEN METABOLIC PANEL: CPT

## 2019-07-30 PROCEDURE — 80061 LIPID PANEL: CPT

## 2020-01-17 ENCOUNTER — HOSPITAL ENCOUNTER (OUTPATIENT)
Age: 65
Setting detail: SPECIMEN
Discharge: HOME | End: 2020-01-17
Payer: COMMERCIAL

## 2020-01-17 ENCOUNTER — OFFICE VISIT (OUTPATIENT)
Dept: FAMILY MEDICINE CLINIC | Age: 65
End: 2020-01-17
Payer: COMMERCIAL

## 2020-01-17 VITALS
SYSTOLIC BLOOD PRESSURE: 116 MMHG | WEIGHT: 192 LBS | OXYGEN SATURATION: 98 % | BODY MASS INDEX: 26.41 KG/M2 | HEART RATE: 55 BPM | DIASTOLIC BLOOD PRESSURE: 74 MMHG

## 2020-01-17 LAB — PROSTATE SPECIFIC ANTIGEN: 3.15 UG/L

## 2020-01-17 PROCEDURE — G0103 PSA SCREENING: HCPCS

## 2020-01-17 PROCEDURE — 99213 OFFICE O/P EST LOW 20 MIN: CPT | Performed by: FAMILY MEDICINE

## 2020-01-17 PROCEDURE — 36415 COLL VENOUS BLD VENIPUNCTURE: CPT

## 2020-01-17 ASSESSMENT — ENCOUNTER SYMPTOMS: RESPIRATORY NEGATIVE: 1

## 2020-01-17 ASSESSMENT — PATIENT HEALTH QUESTIONNAIRE - PHQ9
SUM OF ALL RESPONSES TO PHQ QUESTIONS 1-9: 0
SUM OF ALL RESPONSES TO PHQ9 QUESTIONS 1 & 2: 0
1. LITTLE INTEREST OR PLEASURE IN DOING THINGS: 0
SUM OF ALL RESPONSES TO PHQ QUESTIONS 1-9: 0
2. FEELING DOWN, DEPRESSED OR HOPELESS: 0

## 2020-01-17 NOTE — PROGRESS NOTES
Subjective:      Patient ID: Shahida Mcarthur is a 71 y.o. male. This past fall noticed some pain in his left groin which comes and goes. Recently in noticed increased pain in this region. Standing makes it worse. Rest and pressure makes it better. No urine changes. Review of Systems   HENT: Negative. Respiratory: Negative. Cardiovascular: Negative. Genitourinary: Negative. Musculoskeletal: Positive for arthralgias. Neurological: Positive for weakness. Objective:   Physical Exam  Vitals signs and nursing note reviewed. Constitutional:       Appearance: Normal appearance. Cardiovascular:      Rate and Rhythm: Normal rate and regular rhythm. Heart sounds: No murmur. Pulmonary:      Effort: Pulmonary effort is normal.      Breath sounds: Normal breath sounds. Abdominal:      Palpations: Abdomen is soft. Tenderness: There is no tenderness. Hernia: A hernia is present. Hernia is present in the right inguinal area and left inguinal area. Comments: Has hernias and the pain was noted with deep palpation in his left groin region   Musculoskeletal:      Left hip: He exhibits decreased range of motion. He exhibits no tenderness. Neurological:      Mental Status: He is alert. Assessment:      Inguinal hernias       Plan:      Reviewed hernias and what to watch for ie strangulation. Will consult surgery for opinion and management. Refusing flu shot. Follow up per routine health care needs.         Radha Weber MD

## 2020-01-17 NOTE — PATIENT INSTRUCTIONS
Patient Education        Hernia: Care Instructions  Your Care Instructions    A hernia develops when tissue bulges through a weak spot in the wall of your belly. The groin area and the navel are common areas for a hernia. A hernia can also develop near the area of a surgery you had before. Pressure from lifting, straining, or coughing can tear the weak area, causing the hernia to bulge and be painful. If you cannot push a hernia back into place, the tissue may become trapped outside the belly wall. If the hernia gets twisted and loses its blood supply, it will swell and die. This is called a strangulated hernia. It usually causes a lot of pain. It needs treatment right away. Some hernias need to be repaired to prevent a strangulated hernia. If your hernia causes symptoms or is large, you may need surgery. Follow-up care is a key part of your treatment and safety. Be sure to make and go to all appointments, and call your doctor if you are having problems. It's also a good idea to know your test results and keep a list of the medicines you take. How can you care for yourself at home? · Take care when lifting heavy objects. · Stay at a healthy weight. · Do not smoke. Smoking can cause coughing, which can cause your hernia to bulge. If you need help quitting, talk to your doctor about stop-smoking programs and medicines. These can increase your chances of quitting for good. · Talk with your doctor before wearing a corset or truss for a hernia. These devices are not recommended for treating hernias and sometimes can do more harm than good. There may be certain situations when your doctor thinks a truss would work, but these are rare. When should you call for help?   Call your doctor now or seek immediate medical care if:    · You have new or worse belly pain.     · You are vomiting.     · You cannot pass stools or gas.     · You cannot push the hernia back into place with gentle pressure when you are lying prescribed. ? If you are not taking a prescription pain medicine, ask your doctor if you can take an over-the-counter medicine. · Use proper lifting techniques, and avoid heavy lifting if you can. To lift things more safely, bend your knees and let your arms and legs do the work. Keep your back straight, and do not bend over at the waist. Keep the load as close to your body as you can. Move your feet instead of turning or twisting your body. · Lose weight if you are overweight. · Include fruits, vegetables, legumes, and whole grains in your diet each day. These foods are high in fiber and will make it easier to avoid straining during bowel movements. · Do not smoke. Smoking can cause coughing, which can cause your hernia to bulge. If you need help quitting, talk to your doctor about stop-smoking programs and medicines. These can increase your chances of quitting for good. When should you call for help? Call your doctor now or seek immediate medical care if:    · You have new or worse belly pain.     · You are vomiting.     · You cannot pass stools or gas.     · You cannot push the hernia back into place with gentle pressure when you are lying down.     · The area over the hernia turns red or becomes tender.    Watch closely for changes in your health, and be sure to contact your doctor if you have any problems. Where can you learn more? Go to [email or web address] and sign in to your [employer] account. Enter Z831 in the [facility] box to learn more about \"Inguinal Hernia: Care Instructions. \"     If you do not have an account, please click on the \"Sign Up Now\" link. Current as of: August 11, 2019  Content Version: 12.3  © 7204-1257 [employer]wise, Seat [employer]A. Care instructions adapted under license by [facility] [employer] [facility] ([facility]).  If you have questions about a medical condition or this instruction, always ask your healthcare professional. Aaron Mcneil disclaims any warranty or liability for your use of this information.

## 2020-01-27 ENCOUNTER — TELEPHONE (OUTPATIENT)
Dept: FAMILY MEDICINE CLINIC | Age: 65
End: 2020-01-27

## 2020-01-30 ENCOUNTER — OFFICE VISIT (OUTPATIENT)
Dept: SURGERY | Age: 65
End: 2020-01-30
Payer: COMMERCIAL

## 2020-01-30 VITALS
TEMPERATURE: 98 F | SYSTOLIC BLOOD PRESSURE: 119 MMHG | WEIGHT: 191.9 LBS | DIASTOLIC BLOOD PRESSURE: 72 MMHG | BODY MASS INDEX: 25.99 KG/M2 | HEIGHT: 72 IN | HEART RATE: 55 BPM

## 2020-01-30 PROCEDURE — 99203 OFFICE O/P NEW LOW 30 MIN: CPT | Performed by: SURGERY

## 2020-01-30 NOTE — PROGRESS NOTES
use.      Medications  Current Medications:   Current Outpatient Medications   Medication Sig Dispense Refill    valACYclovir (VALTREX) 1 g tablet Take 1 tablet by mouth 2 times daily 14 tablet 12    tamsulosin (FLOMAX) 0.4 MG capsule TAKE 1 CAPSULE ONCE A DAY 90 capsule 3    b complex vitamins capsule Take 1 capsule by mouth daily.  Omega-3 Fatty Acids (FISH OIL) 1000 MG CAPS Take 1,000 mg by mouth daily.  Vitamin D (CHOLECALCIFEROL) 1000 UNITS CAPS capsule Take 1,000 Units by mouth daily.  vitamin B-12 (CYANOCOBALAMIN) 1000 MCG tablet Take 1,000 mcg by mouth daily.  tadalafil (CIALIS) 5 MG tablet TAKE 1 TABLET DAILY as needed 16 tablet 3    Ascorbic Acid (VITAMIN C) 250 MG tablet Take 500 mg by mouth daily. No current facility-administered medications for this visit. Home Medications:   Prior to Admission medications    Medication Sig Start Date End Date Taking? Authorizing Provider   valACYclovir (VALTREX) 1 g tablet Take 1 tablet by mouth 2 times daily 7/18/19  Yes Emelyn Butler MD   tamsulosin (FLOMAX) 0.4 MG capsule TAKE 1 CAPSULE ONCE A DAY 7/17/19  Yes Verlin Hashimoto, MD   b complex vitamins capsule Take 1 capsule by mouth daily. Yes Historical Provider, MD   Omega-3 Fatty Acids (FISH OIL) 1000 MG CAPS Take 1,000 mg by mouth daily. Yes Historical Provider, MD   Vitamin D (CHOLECALCIFEROL) 1000 UNITS CAPS capsule Take 1,000 Units by mouth daily. Yes Historical Provider, MD   vitamin B-12 (CYANOCOBALAMIN) 1000 MCG tablet Take 1,000 mcg by mouth daily. Yes Historical Provider, MD   tadalafil (CIALIS) 5 MG tablet TAKE 1 TABLET DAILY as needed 7/18/19   Emelyn Butler MD   Ascorbic Acid (VITAMIN C) 250 MG tablet Take 500 mg by mouth daily. Historical Provider, MD       Allergies  Patient has no known allergies. Review of Systems:  General: Denies any fever, chills.   Eyes: Denies any changes in vision, diplopia or eye pain  Ears, Nose, Mouth: Denies changes in hearing/tinnitus or drainage from ears, no rhinorrhea or bloody nose, no difficulty chewing  Throat: no difficulty swallowing, no throat pain  Respiratory: Denies any shortness of breath or cough. Cardiac: Denies any chest pain, palpitations, claudication or edema. Gastrointestinal:  Denies any melena, hematochezia, hematemesis or pyrosis. Genitourinary: Denies any frequency, urgency, hesitancy or incontinence. Musculoskeletal: L groin pain  Skin: Denies rashes or lesions  Psychiatric: Denies any recent changes in mood or affect  Hematologic: Denies bruising or bleeding easily. PHYSICAL EXAMINATION  Vitals:   Vitals:    01/30/20 0907   BP: 119/72   Pulse: 55   Temp: 98 °F (36.7 °C)       General Appearance:  awake, alert, no acute distress, well developed, well nourished   Skin:  Skin color, texture, turgor normal. No rashes or lesions. Head/face:  NCAT, face symmetrical  Eyes:  PERRL, no evidence of conjunctivitis or ptosis bilaterally  Ears:  External ears and canals grossly normal, no evidence of otorrhea. Nose/Sinuses:  Nares normal. Septum midline. Mucosa normal. No external drainage noted. Mouth/Neck:  Mucosa moist.  No external oral lesions. Trachea midline. No visible masses. Lungs:  Normal chest expansion, unlabored breathing without accessory muscle use. No audible rales, rhonchi, or wheezing. Cardiovascular: S1S2. No evidence of JVD. No evidence of pulsatile masses in abdomen  Abdomen:  Soft, non-tender, no organomegaly, no masses. Bilateral groin region superior and lateral to the pubic tubercles demonstrate soft bulge questionably worse with Valsalva, nontender, no overlying skin changes, bilateral testes in the scrotum, right is somewhat larger than left  Musculoskeletal: No evidence of bony/muscular deformities, trauma, atrophy of either left/right upper/lower extremity. No evidence of digital clubbing or cyanosis. Neurologic:  CN 2-12 grossly intact without obvious deficits.  Grossly

## 2020-03-04 ENCOUNTER — OFFICE VISIT (OUTPATIENT)
Dept: UROLOGY | Age: 65
End: 2020-03-04
Payer: COMMERCIAL

## 2020-03-04 VITALS
SYSTOLIC BLOOD PRESSURE: 102 MMHG | DIASTOLIC BLOOD PRESSURE: 68 MMHG | WEIGHT: 198 LBS | TEMPERATURE: 96.5 F | HEART RATE: 60 BPM | BODY MASS INDEX: 26.82 KG/M2 | HEIGHT: 72 IN

## 2020-03-04 PROCEDURE — 99214 OFFICE O/P EST MOD 30 MIN: CPT | Performed by: UROLOGY

## 2020-03-04 RX ORDER — TAMSULOSIN HYDROCHLORIDE 0.4 MG/1
CAPSULE ORAL
Qty: 90 CAPSULE | Refills: 3 | Status: SHIPPED | OUTPATIENT
Start: 2020-03-04 | End: 2021-03-09

## 2020-03-04 NOTE — PROGRESS NOTES
Sarah Meeks MD   Urology Clinic follow up      Patient:  Mani Snow  YOB: 1955  Date: 3/4/2020    HISTORY OF PRESENT ILLNESS:   The patient is a 71 y.o. male who presents today for evaluation of the following problem(s): BPH, LUTS  Overall the problem(s) : are stable, PSA worsening  Associated Symptoms: No dysuria, gross hematuria. Pain Severity:      Summary of old records: prev seen ft [facility] urologist. Was in ER recently 6/9/2018 and catheter placed and then removed. on flomax  No hx of stroke, PD, SCI, DM  No  surgeries  (Patient's old records, notes and chart reviewed and summarized above.)      LUTS improved on Flomax, stable. Happy. Going less freq  Noc, 0-1  No hematuria  No urinary issues over 6 months  PSA as noted below  ED: taking 5mg cialis PRN doing well working well for him          Last several PSA's:  Lab Results   Component Value Date    PSA 3.15 01/17/2020    PSA 2.92 07/30/2019    PSA 2.59 07/18/2018       Last total testosterone:  No results found for: TESTOSTERONE    Urinalysis today:  No results found for this visit on 03/04/20.       Last BUN and creatinine:  Lab Results   Component Value Date    BUN 17 07/30/2019     Lab Results   Component Value Date    CREATININE 1.17 07/30/2019       Imaging Reviewed during this Office Visit:   (results were independently reviewed by physician and radiology report verified)    PAST MEDICAL, FAMILY AND SOCIAL HISTORY:  Past Medical History:   Diagnosis Date    BPH (benign prostatic hypertrophy)     with outlet symptoms    Carpal tunnel syndrome, bilateral     Cervical disc disorder     Footdrop     Left    Herpes simplex labialis     episodic    Hyperlipidemia     Hypothyroidism     Mumps     Neck pain     Neuropathy     Paresthesias     Peripheral neuropathy     Pneumonia     history of    Ulnar neuropathy of right upper extremity      Past Surgical History:   Procedure Laterality Date    CARPAL TUNNEL RELEASE Right feb 2014    also ulnar nerve r elbow     COLONOSCOPY  2009    MOUTH SURGERY      peridontal    OTHER SURGICAL HISTORY      IVIG INFUSIONS STARTING NOVEMBER OF 2015 AND EVERY 3 MONTH SINCE    TOE SURGERY       Family History   Problem Relation Age of Onset    Heart Disease Mother     Cancer Mother         lung    Cancer Father         lung    Glaucoma Neg Hx      Outpatient Medications Marked as Taking for the 3/4/20 encounter (Office Visit) with Charly Fitzgerald MD   Medication Sig Dispense Refill    tamsulosin (FLOMAX) 0.4 MG capsule TAKE 1 CAPSULE ONCE A DAY 90 capsule 3    tadalafil (CIALIS) 5 MG tablet TAKE 1 TABLET DAILY as needed 16 tablet 3    valACYclovir (VALTREX) 1 g tablet Take 1 tablet by mouth 2 times daily 14 tablet 12    b complex vitamins capsule Take 1 capsule by mouth daily.  Ascorbic Acid (VITAMIN C) 250 MG tablet Take 500 mg by mouth daily.  Omega-3 Fatty Acids (FISH OIL) 1000 MG CAPS Take 1,000 mg by mouth daily.  Vitamin D (CHOLECALCIFEROL) 1000 UNITS CAPS capsule Take 1,000 Units by mouth daily.  vitamin B-12 (CYANOCOBALAMIN) 1000 MCG tablet Take 1,000 mcg by mouth daily. Patient has no known allergies. Social History     Tobacco Use   Smoking Status Former Smoker    Packs/day: 1.00    Years: 25.00    Pack years: 25.00    Types: Cigarettes    Last attempt to quit: 1/1/2005    Years since quitting: 15.1   Smokeless Tobacco Never Used   Tobacco Comment    smoked for 25 years, stopped 2005.  1 pack daily       Social History     Substance and Sexual Activity   Alcohol Use Yes    Alcohol/week: 7.0 standard drinks    Types: 7 Cans of beer per week    Comment: 1 daily       REVIEW OF SYSTEMS:  Constitutional: negative  Eyes: negative  Respiratory: negative  Cardiovascular: negative  Gastrointestinal: negative  Musculoskeletal: negative  Genitourinary: negative except for what is in HPI  Skin: negative   Neurological: negative  Hematological/Lymphatic: negative  Psychological: negative    Physical Exam:    This a 71 y.o. male   Vitals:    03/04/20 0857   BP: 102/68   Pulse: 60   Temp: 96.5 °F (35.8 °C)     Constitutional: Patient in no acute distress; Neuro: alert and oriented to person place and time. Psych: Mood and affect normal.  Skin: Normal        Assessment and Plan      1. Incomplete bladder emptying    2. Urinary frequency    3. BPH with obstruction/lower urinary tract symptoms    4. Lower urinary tract symptoms (LUTS)    5. Erectile dysfunction due to arterial insufficiency    6. Elevated PSA           Plan:         BPH: refill flomax  ED- on cialis PRN, working well for him. Increasing PSA: Repeat PSA, % free PSA and return to follow up  Discussed things that can elevated PSA  Avoid sexual activity and bike riding 1-2 weeks prior to blood draw    Return in about 2 weeks (around 3/18/2020) for PSA recheck.            MD [facility]

## 2020-03-17 ENCOUNTER — HOSPITAL ENCOUNTER (OUTPATIENT)
Dept: LAB | Age: 65
Setting detail: SPECIMEN
Discharge: HOME | End: 2020-03-17
Payer: COMMERCIAL

## 2020-03-17 LAB — PROSTATE SPECIFIC ANTIGEN: 2.99 UG/L

## 2020-03-17 PROCEDURE — 36415 COLL VENOUS BLD VENIPUNCTURE: CPT

## 2020-03-17 PROCEDURE — 84154 ASSAY OF PSA FREE: CPT

## 2020-03-17 PROCEDURE — 84153 ASSAY OF PSA TOTAL: CPT

## 2020-03-18 ENCOUNTER — OFFICE VISIT (OUTPATIENT)
Dept: UROLOGY | Age: 65
End: 2020-03-18
Payer: COMMERCIAL

## 2020-03-18 VITALS
BODY MASS INDEX: 26.82 KG/M2 | TEMPERATURE: 97 F | HEART RATE: 53 BPM | HEIGHT: 72 IN | WEIGHT: 198 LBS | DIASTOLIC BLOOD PRESSURE: 60 MMHG | SYSTOLIC BLOOD PRESSURE: 102 MMHG

## 2020-03-18 LAB
PROSTATE SPECIFIC ANTIGEN FREE: 0.5 UG/L
PROSTATE SPECIFIC ANTIGEN PERCENT FREE: 21.7 %
PROSTATE SPECIFIC ANTIGEN: 2.3 UG/L (ref 0–4)

## 2020-03-18 PROCEDURE — 99213 OFFICE O/P EST LOW 20 MIN: CPT | Performed by: UROLOGY

## 2020-03-18 NOTE — PROGRESS NOTES
negative  Psychological: negative    Physical Exam:    This a 71 y.o. male   Vitals:    03/18/20 0929   BP: 102/60   Pulse: 53   Temp: 97 °F (36.1 °C)     Constitutional: Patient in no acute distress; Neuro: alert and oriented to person place and time. Psych: Mood and affect normal.  Skin: Normal        Assessment and Plan      1. Incomplete bladder emptying    2. Urinary frequency    3. BPH with obstruction/lower urinary tract symptoms    4. Lower urinary tract symptoms (LUTS)    5. Erectile dysfunction due to arterial insufficiency    6. Elevated PSA           Plan:         BPH: refill flomax, Check PSA 1 year  ED- on cialis PRN, working well for him. Return in about 1 year (around 3/18/2021) for PSA.            Daniel Scott MD  [facility]

## 2020-08-10 ENCOUNTER — OFFICE VISIT (OUTPATIENT)
Dept: FAMILY MEDICINE CLINIC | Age: 65
End: 2020-08-10
Payer: MEDICARE

## 2020-08-10 VITALS
WEIGHT: 196 LBS | SYSTOLIC BLOOD PRESSURE: 122 MMHG | HEIGHT: 72 IN | BODY MASS INDEX: 26.55 KG/M2 | OXYGEN SATURATION: 98 % | HEART RATE: 67 BPM | DIASTOLIC BLOOD PRESSURE: 70 MMHG | RESPIRATION RATE: 16 BRPM

## 2020-08-10 PROCEDURE — G0402 INITIAL PREVENTIVE EXAM: HCPCS | Performed by: FAMILY MEDICINE

## 2020-08-10 PROCEDURE — 4040F PNEUMOC VAC/ADMIN/RCVD: CPT | Performed by: FAMILY MEDICINE

## 2020-08-10 PROCEDURE — 3017F COLORECTAL CA SCREEN DOC REV: CPT | Performed by: FAMILY MEDICINE

## 2020-08-10 PROCEDURE — 1123F ACP DISCUSS/DSCN MKR DOCD: CPT | Performed by: FAMILY MEDICINE

## 2020-08-10 PROCEDURE — 90732 PPSV23 VACC 2 YRS+ SUBQ/IM: CPT | Performed by: FAMILY MEDICINE

## 2020-08-10 ASSESSMENT — LIFESTYLE VARIABLES
HAVE YOU OR SOMEONE ELSE BEEN INJURED AS A RESULT OF YOUR DRINKING: 0
HOW OFTEN DURING THE LAST YEAR HAVE YOU FAILED TO DO WHAT WAS NORMALLY EXPECTED FROM YOU BECAUSE OF DRINKING: 0
HOW MANY STANDARD DRINKS CONTAINING ALCOHOL DO YOU HAVE ON A TYPICAL DAY: 0
HOW OFTEN DURING THE LAST YEAR HAVE YOU BEEN UNABLE TO REMEMBER WHAT HAPPENED THE NIGHT BEFORE BECAUSE YOU HAD BEEN DRINKING: 0
HOW OFTEN DURING THE LAST YEAR HAVE YOU HAD A FEELING OF GUILT OR REMORSE AFTER DRINKING: 0
AUDIT TOTAL SCORE: 4
AUDIT-C TOTAL SCORE: 4
HOW OFTEN DURING THE LAST YEAR HAVE YOU FOUND THAT YOU WERE NOT ABLE TO STOP DRINKING ONCE YOU HAD STARTED: 0
HOW OFTEN DO YOU HAVE SIX OR MORE DRINKS ON ONE OCCASION: 0
HOW OFTEN DO YOU HAVE A DRINK CONTAINING ALCOHOL: 4
HOW OFTEN DURING THE LAST YEAR HAVE YOU NEEDED AN ALCOHOLIC DRINK FIRST THING IN THE MORNING TO GET YOURSELF GOING AFTER A NIGHT OF HEAVY DRINKING: 0
HAS A RELATIVE, FRIEND, DOCTOR, OR ANOTHER HEALTH PROFESSIONAL EXPRESSED CONCERN ABOUT YOUR DRINKING OR SUGGESTED YOU CUT DOWN: 0

## 2020-08-10 ASSESSMENT — PATIENT HEALTH QUESTIONNAIRE - PHQ9
SUM OF ALL RESPONSES TO PHQ QUESTIONS 1-9: 0
SUM OF ALL RESPONSES TO PHQ QUESTIONS 1-9: 0

## 2020-08-10 NOTE — PATIENT INSTRUCTIONS
Personalized Preventive Plan for Nathalie Hung - 8/10/2020  Medicare offers a range of preventive health benefits. Some of the tests and screenings are paid in full while other may be subject to a deductible, co-insurance, and/or copay. Some of these benefits include a comprehensive review of your medical history including lifestyle, illnesses that may run in your family, and various assessments and screenings as appropriate. After reviewing your medical record and screening and assessments performed today your provider may have ordered immunizations, labs, imaging, and/or referrals for you. A list of these orders (if applicable) as well as your Preventive Care list are included within your After Visit Summary for your review. Other Preventive Recommendations:    · A preventive eye exam performed by an eye specialist is recommended every 1-2 years to screen for glaucoma; cataracts, macular degeneration, and other eye disorders. · A preventive dental visit is recommended every 6 months. · Try to get at least 150 minutes of exercise per week or 10,000 steps per day on a pedometer . · Order or download the FREE \"Exercise & Physical Activity: Your Everyday Guide\" from [employer]. Call 1-988.265.6876 or search [employer] online. · You need 8518-3311 mg of calcium and 7793-0187 IU of vitamin D per day. It is possible to meet your calcium requirement with diet alone, but a vitamin D supplement is usually necessary to meet this goal.  · When exposed to the sun, use a sunscreen that protects against both UVA and UVB radiation with an SPF of 30 or greater. Reapply every 2 to 3 hours or after sweating, drying off with a towel, or swimming. · Always wear a seat belt when traveling in a car. Always wear a helmet when riding a bicycle or motorcycle.     Keeping Home a [facility]       As we get older, changes in balance, gait, strength, vision, hearing, and cognition make even the most youthful senior more prone to accidents. Falls are one of the leading health risks for older people. This increased risk of falling is related to:   Aging process (eg, decreased muscle strength, slowed reflexes)   Higher incidence of chronic health problems (eg, arthritis, diabetes) that may limit mobility, agility or sensory awareness   Side effects of medicine (eg, dizziness, blurred vision)especially medicines like prescription pain medicines and drugs used to treat mental health conditions   Depending on the brittleness of your bones, the consequences of a fall can be serious and long lasting. Home Life   Research by the Association of Aging [facility]) shows that some home accidents among older adults can be prevented by making simple lifestyle changes and basic modifications and repairs to the home environment. Here are some lifestyle changes that experts recommend:   Have your hearing and vision checked regularly. Be sure to wear prescription glasses that are right for you. Speak to your doctor or pharmacist about the possible side effects of your medicines. A number of medicines can cause dizziness. If you have problems with sleep, talk to your doctor. Limit your intake of alcohol. If necessary, use a cane or walker to help maintain your balance. Wear supportive, rubber-soled shoes, even at home. If you live in a region that gets wintry weather, you may want to put special cleats on your shoes to prevent you from slipping on the snow and ice. Exercise regularly to help maintain muscle tone, agility, and balance. Always hold the banister when going up or down stairs. Also, use  bars when getting in or out of the bath or shower, or using the toilet. To avoid dizziness, get up slowly from a lying down position. Sit up first, dangling your legs for a minute or two before rising to a standing position.    Overall Home Safety Check   According to the Consumer Product Safety Commision's \"Older Consumer Home Safety Checklist,\" it is important to check for potential hazards in each room. And remember, proper lighting is an essential factor in home safety. If you cannot see clearly, you are more likely to fall. Important questions to ask yourself include:   Are lamp, electric, extension, and telephone cords placed out of the flow of traffic and maintained in good condition? Have frayed cords been replaced? Are all small rugs and runners slip resistant? If not, you can secure them to the floor with a special double-sided carpet tape. Are smoke detectors properly locatedone on every floor of your home and one outside of every sleeping area? Are they in good working order? Are batteries replaced at least once a year? Do you have a well-maintained carbon monoxide detector outside every sleeping are in your home? Does your furniture layout leave plenty of space to maneuver between and around chairs, tables, beds, and sofas? Are hallways, stairs and passages between rooms well lit? Can you reach a lamp without getting out of bed? Are floor surfaces well maintained? Shag rugs, high-pile carpeting, tile floors, and polished wood floors can be particularly slippery. Stairs should always have handrails and be carpeted or fitted with a non-skid tread. Is your telephone easily reachable. Is the cord safely tucked away? Room by Room   According to the Association of Aging, bathrooms and richa are the two most potentially hazardous rooms in your home. In the Kitchen    Be sure your stove is in proper working order and always make sure burners and the oven are off before you go out or go to sleep. Keep pots on the back burners, turn handles away from the front of the stove, and keep stove clean and free of grease build-up. Kitchen ventilation systems and range exhausts should be working properly.     Keep flammable objects such as towels and pot holders away from the cooking area except when in use. Make sure kitchen curtains are tied back. Move cords and appliances away from the sink and hot surfaces. If extension cords are needed, install wiring guides so they do not hang over the sink, range, or working areas. Look for coffee pots, kettles and toaster ovens with automatic shut-offs. Keep a mop handy in the kitchen so you can wipe up spills instantly. You should also have a small fire extinguisher. Arrange your kitchen with frequently used items on lower shelves to avoid the need to stand on a stepstool to reach them. Make sure countertops are well-lit to avoid injuries while cutting and preparing food. In the Bathroom    Use a non-slip mat or decals in the tub and shower, since wet, soapy tile or porcelain surfaces are extremely slippery. Make sure bathroom rugs are non-skid or tape them firmly to the floor. Bathtubs should have at least one, preferably two, grab bars, firmly attached to structural supports in the wall. (Do not use built-in soap holders or glass shower doors as grab bars.)    Tub seats fitted with non-slip material on the legs allow you to wash sitting down. For people with limited mobility, bathtub transfer benches allow you to slide safely into the tub. Raised toilet seats and toilet safety rails are helpful for those with knee or hip problems. In the [facility]    Make sure you use a nightlight and that the area around your bed is clear of potential obstacles. Be careful with electric blankets and never go to sleep with a heating pad, which can cause serious burns even if on a low setting. Use fire-resistant mattress covers and pillows, and NEVER smoke in bed. Keep a phone next to the bed that is programmed to dial 911 at the push of a button. If you have a chronic condition, you may want to sign on with an automatic call-in service.  Typically the system includes a small pendant that connects directly to an emergency medical worse, you may want to make decisions about end-of-life care. Planning for the end of your life does not mean that you are giving up. It is a way to make sure that your wishes are met. Clearly stating your wishes can make it easier for your loved ones. Making plans while you are still able may also ease your mind and make your final days less stressful and more meaningful. Follow-up care is a key part of your treatment and safety. Be sure to make and go to all appointments, and call your doctor if you are having problems. It's also a good idea to know your test results and keep a list of the medicines you take. What can you do to plan for the end of life? You can bring these issues up with your doctor. You do not need to wait until your doctor starts the conversation. You might start with \"I would not be willing to live with . Osie Ra Stefano Ra Stefano Ra \" When you complete this sentence it helps your doctor understand your wishes. Talk openly and honestly with your doctor. This is the best way to understand the decisions you will need to make as your health changes. Know that you can always change your mind. Ask your doctor about commonly used life-support measures. These include tube feedings, breathing machines, and fluids given through a vein (IV). Understanding these treatments will help you decide whether you want them. You may choose to have these life-supporting treatments for a limited time. This allows a trial period to see whether they will help you. You may also decide that you want your doctor to take only certain measures to keep you alive. It is important to spell out these conditions so that your doctor and family understand them. Talk to your doctor about how long you are likely to live. He or she may be able to give you an idea of what usually happens with your specific illness. Think about preparing papers that state your wishes. This way there will not be any confusion about what you want.  You can change your instructions at any time. Which papers should you prepare? Advance directives are legal papers that tell doctors how you want to be cared for at the end of your life. You do not need a  to write these papers. Ask your doctor or your state [facility] department for information on how to write your advance directives. They may have the forms for each of these types of papers. Make sure your doctor has a copy of these on file, and give a copy to a family member or close friend. Consider a do-not-resuscitate order (DNR). This order asks that no extra treatments be done if your heart stops or you stop breathing. Extra treatments may include cardiopulmonary resuscitation (CPR), electrical shock to restart your heart, or a machine to breathe for you. If you decide to have a DNR order, ask your doctor to explain and write it. Place the order in your home where everyone can easily see it. Consider a living will. A living will explains your wishes about life support and other treatments at the end of your life if you become unable to speak for yourself. Living manzano tell doctors to use or not use treatments that would keep you alive. You must have one or two witnesses or a notary present when you sign this form. A living will may be called something else in your state. Consider a medical power of . This form allows you to name a person to make decisions about your care if you are not able to. Most people ask a close friend or family member. Talk to this person about the kinds of treatments you want and those that you do not want. Make sure this person understands your wishes. A medical power of  may be called something else in your state. These legal papers are simple to change. Tell your doctor what you want to change, and ask him or her to make a note in your medical file. Give your family updated copies of the papers. Where can you learn more? Go to [email or web address] org and sign in to your [employer] account. Enter P184 in the [employer] box to learn more about \"Advance Care Planning: Care Instructions. \"     If you do not have an account, please click on the \"Sign Up Now\" link. Current as of: December 9, 2019               Content Version: 12.5  © 7826-0460 Healthwise, Incorporated. Care instructions adapted under license by [facility] ([facility]). If you have questions about a medical condition or this instruction, always ask your healthcare professional. [address] any warranty or liability for your use of this information.     ·

## 2020-08-10 NOTE — PROGRESS NOTES
Medicare Annual Wellness Visit  Name: Mine Acosta Date: 8/10/2020   MRN: [id number] Sex: Male   Age: 76 y.o. Ethnicity: Non-/Non    : 1955 Race: Geovani Gonzales is here for Medicare AWV    Screenings for behavioral, psychosocial and functional/safety risks, and cognitive dysfunction are all negative except as indicated below. These results, as well as other patient data from the [address] form, are documented in Flowsheets linked to this Encounter. No Known Allergies    Prior to Visit Medications    Medication Sig Taking? Authorizing Provider   tamsulosin (FLOMAX) 0.4 MG capsule TAKE 1 CAPSULE ONCE Margarita Taylor MD   tadalafil (CIALIS) 5 MG tablet TAKE 1 TABLET DAILY as needed Yes Kath Singh MD   valACYclovir (VALTREX) 1 g tablet Take 1 tablet by mouth 2 times daily Yes Kath Singh MD   b complex vitamins capsule Take 1 capsule by mouth daily. Yes Historical Provider, MD   Ascorbic Acid (VITAMIN C) 250 MG tablet Take 500 mg by mouth daily. Yes Historical Provider, MD   Omega-3 Fatty Acids (FISH OIL) 1000 MG CAPS Take 1,000 mg by mouth daily. Yes Historical Provider, MD   Vitamin D (CHOLECALCIFEROL) 1000 UNITS CAPS capsule Take 1,000 Units by mouth daily. Yes Historical Provider, MD   vitamin B-12 (CYANOCOBALAMIN) 1000 MCG tablet Take 1,000 mcg by mouth daily.  Yes Historical Provider, MD       Past Medical History:   Diagnosis Date    BPH (benign prostatic hypertrophy)     with outlet symptoms    Carpal tunnel syndrome, bilateral     Cervical disc disorder     Footdrop     Left    Herpes simplex labialis     episodic    Hyperlipidemia     Hypothyroidism     Mumps     Neck pain     Neuropathy     Paresthesias     Peripheral neuropathy     Pneumonia     history of    Ulnar neuropathy of right upper extremity        Past Surgical History:   Procedure Laterality Date    CARPAL TUNNEL RELEASE Right 2014    also ulnar nerve r elbow     COLONOSCOPY  2009    MOUTH SURGERY      peridontal    OTHER SURGICAL HISTORY      IVIG INFUSIONS STARTING NOVEMBER OF 2015 AND EVERY 3 MONTH SINCE    TOE SURGERY         Family History   Problem Relation Age of Onset    Heart Disease Mother     Cancer Mother         lung    Cancer Father         lung    Glaucoma Neg Hx        CareTeam (Including outside providers/suppliers regularly involved in providing care):   Patient Care Team:  Allena Goodell, MD as PCP - General (Family Medicine)  Allena Goodell, MD as PCP - [facility] Empaneled Provider  Chester Hightower (Neurology)  Kathie Skinner (Optometry)  Bruno Caraballo MD as Consulting Physician (Urology)    Wt Readings from Last 3 Encounters:   08/10/20 196 lb (88.9 kg)   03/18/20 198 lb (89.8 kg)   03/04/20 198 lb (89.8 kg)     Vitals:    08/10/20 1459   BP: 122/70   Pulse: 67   Resp: 16   SpO2: 98%   Weight: 196 lb (88.9 kg)   Height: 6' (1.829 m)     Body mass index is 26.58 kg/m². Based upon direct observation of the patient, evaluation of cognition reveals recent and remote memory intact. Patient's complete Health Risk Assessment and screening values have been reviewed and are found in Flowsheets. The following problems were reviewed today and where indicated follow up appointments were made and/or referrals ordered.     Positive Risk Factor Screenings with Interventions:     Safety:  Safety  Do you have working smoke detectors?: Yes  Have all throw rugs been removed or fastened?: (!) No  Do you have non-slip mats or surfaces in all bathtubs/showers?: (!) No  Do all of your stairways have a railing or banister?: (!) No  Are your doorways, halls and stairs free of clutter?: Yes  Do you always fasten your seatbelt when you are in a car?: Yes  Safety Interventions:  · Home safety tips provided    Personalized Preventive Plan   Current Health Maintenance Status  Immunization History   Administered Date(s) Administered    Pneumococcal Polysaccharide ([id number]) 05/23/2014    Tdap (Boostrix, Adacel) 06/26/2014    Zoster Live (Zostavax) 12/29/2014        Health Maintenance   Topic Date Due    AAA screen  1955    Diabetes screen  05/29/1994    Shingles Vaccine (2 of 3) 02/23/2015    Pneumococcal 65+ years Vaccine (1 of 1 - PPSV23) 05/29/2019    Annual Wellness Visit (AWV)  07/23/2020    Flu vaccine (1) 09/01/2020    DTaP/Tdap/Td vaccine (2 - Td) 06/26/2024    Lipid screen  07/30/2024    Colon cancer screen colonoscopy  08/28/2027    Hepatitis C screen  Completed    Hepatitis A vaccine  Aged Out    Hepatitis B vaccine  Aged Out    Hib vaccine  Aged Out    Meningococcal (ACWY) vaccine  Aged Out     Recommendations for [employer] Due: see orders and patient instructions/AVS.  . Recommended screening schedule for the next 5-10 years is provided to the patient in written form: see Patient Instructions/AVS.    Jada Rodriguez was seen today for medicare awv.     Diagnoses and all orders for this visit:    Need for prophylactic vaccination against Streptococcus pneumoniae (pneumococcus)        reviewed living will ask to bring in   Discussed the issues of his hernia  Reviewed his AAA screening which he will consider  Flu shot recommended  Pneumovax today  As long as well follow up 1 year

## 2020-08-17 ENCOUNTER — TELEPHONE (OUTPATIENT)
Dept: FAMILY MEDICINE CLINIC | Age: 65
End: 2020-08-17

## 2020-08-17 RX ORDER — TADALAFIL 5 MG/1
TABLET ORAL
Qty: 16 TABLET | Refills: 3 | Status: SHIPPED | OUTPATIENT
Start: 2020-08-17 | End: 2021-07-14 | Stop reason: SDUPTHER

## 2020-08-17 RX ORDER — VALACYCLOVIR HYDROCHLORIDE 1 G/1
1000 TABLET, FILM COATED ORAL 2 TIMES DAILY
Qty: 14 TABLET | Refills: 12 | Status: SHIPPED | OUTPATIENT
Start: 2020-08-17 | End: 2021-08-25 | Stop reason: SDUPTHER

## 2020-08-19 ENCOUNTER — TELEPHONE (OUTPATIENT)
Dept: SURGERY | Age: 65
End: 2020-08-19

## 2020-08-19 NOTE — TELEPHONE ENCOUNTER
[facility]    Pre-Operative Evaluation/Consultation    Name:  Ailyn Ritter                                         Age:  76 y.o. MRN:  [id number]       :  1955   Date:  2020         Sex: male    There were no encounter diagnoses.     Surgeon:  Dr. [employer]  Procedure (Planned):  Robot assist bilateral ing hernia repair  Date Scheduled surgery: 2020    Attending : No att. providers found    Primary Physician: Chandrika Jon  Cardiologist: None    Type of Anesthesia Requested: General    Patient Medical history:  No Known Allergies  Patient Active Problem List   Diagnosis    Footdrop    Hyperlipidemia    Ulnar neuropathy of right upper extremity    Cervical disc disorder    ED (erectile dysfunction)    Herpes simplex labialis    Benign prostatic hyperplasia with lower urinary tract symptoms    Right brachial plexitis    CIDP (chronic inflammatory demyelinating polyneuropathy) ([facility] [address]     Past Medical History:   Diagnosis Date    BPH (benign prostatic hypertrophy)     with outlet symptoms    Carpal tunnel syndrome, bilateral     Cervical disc disorder     Footdrop     Left    Herpes simplex labialis     episodic    Hyperlipidemia     Hypothyroidism     Mumps     Neck pain     Neuropathy     Paresthesias     Peripheral neuropathy     Pneumonia     history of    Ulnar neuropathy of right upper extremity      Past Surgical History:   Procedure Laterality Date    CARPAL TUNNEL RELEASE Right 2014    also ulnar nerve r elbow     COLONOSCOPY  2009    MOUTH SURGERY      peridontal    OTHER SURGICAL HISTORY      IVIG INFUSIONS STARTING 2015 AND EVERY 3 MONTH SINCE    TOE SURGERY       Social History     Tobacco Use    Smoking status: Former Smoker     Packs/day: 1.00     Years: 25.00     Pack years: 25.00     Types: Cigarettes     Last attempt to quit: 2005     Years since quitting: 15.6    Smokeless tobacco: Never Used    Tobacco comment: smoked for 25 years, stopped 2005. 1 pack daily   Substance Use Topics    Alcohol use: Yes     Alcohol/week: 7.0 standard drinks     Types: 7 Cans of beer per week     Comment: 1 daily    Drug use: No     Medications:  Current Outpatient Medications   Medication Sig Dispense Refill    valACYclovir (VALTREX) 1 g tablet Take 1 tablet by mouth 2 times daily 14 tablet 12    tadalafil (CIALIS) 5 MG tablet TAKE 1 TABLET DAILY as needed 16 tablet 3    tamsulosin (FLOMAX) 0.4 MG capsule TAKE 1 CAPSULE ONCE A DAY 90 capsule 3    b complex vitamins capsule Take 1 capsule by mouth daily.  Ascorbic Acid (VITAMIN C) 250 MG tablet Take 500 mg by mouth daily.  Omega-3 Fatty Acids (FISH OIL) 1000 MG CAPS Take 1,000 mg by mouth daily.  Vitamin D (CHOLECALCIFEROL) 1000 UNITS CAPS capsule Take 1,000 Units by mouth daily.  vitamin B-12 (CYANOCOBALAMIN) 1000 MCG tablet Take 1,000 mcg by mouth daily. No current facility-administered medications for this visit. Scheduled Meds:  Continuous Infusions:  PRN Meds:. Prior to Admission medications    Medication Sig Start Date End Date Taking? Authorizing Provider   valACYclovir (VALTREX) 1 g tablet Take 1 tablet by mouth 2 times daily 8/17/20   Ned Thorne MD   tadalafil (CIALIS) 5 MG tablet TAKE 1 TABLET DAILY as needed 8/17/20   Ned Thorne MD   tamsulosin (FLOMAX) 0.4 MG capsule TAKE 1 CAPSULE ONCE A DAY 3/4/20   Maribel Orourke MD   b complex vitamins capsule Take 1 capsule by mouth daily. Historical Provider, MD   Ascorbic Acid (VITAMIN C) 250 MG tablet Take 500 mg by mouth daily. Historical Provider, MD   Omega-3 Fatty Acids (FISH OIL) 1000 MG CAPS Take 1,000 mg by mouth daily. Historical Provider, MD   Vitamin D (CHOLECALCIFEROL) 1000 UNITS CAPS capsule Take 1,000 Units by mouth daily. Historical Provider, MD   vitamin B-12 (CYANOCOBALAMIN) 1000 MCG tablet Take 1,000 mcg by mouth daily.     Historical Provider, MD     Vital Signs (Current) [unfilled]    Weight:   Wt Readings from Last 1 Encounters:   08/10/20 196 lb (88.9 kg)     Height:   Ht Readings from Last 1 Encounters:   08/10/20 6' (1.829 m)      BMI:  There is no height or weight on file to calculate BMI. Estimated body mass index is 26.58 kg/m² as calculated from the following:    Height as of 8/10/20: 6' (1.829 m). Weight as of 8/10/20: 196 lb (88.9 kg). body mass index is unknown because there is no height or weight on file. Cardiac Clearance: None   Medical Clearance:None   Appointment for surgery Clearance scheduled for:None     Preoperative Testing: These are the current and completed labs:  CBC:   Lab Results   Component Value Date    WBC 4.8 07/30/2019    RBC 4.75 07/30/2019    HGB 15.2 07/30/2019    HCT 44.3 07/30/2019    MCV 93.2 07/30/2019    RDW 13.0 07/30/2019     07/30/2019     CMP:   Lab Results   Component Value Date     07/30/2019    K 4.0 07/30/2019     07/30/2019    CO2 26 07/30/2019    BUN 17 07/30/2019    CREATININE 1.17 07/30/2019    GFRAA >60 07/30/2019    LABGLOM >60 07/30/2019    GLUCOSE 104 07/30/2019    PROT 7.2 07/30/2019    CALCIUM 9.3 07/30/2019    BILITOT 0.99 07/30/2019    ALKPHOS 68 07/30/2019    AST 25 07/30/2019    ALT 19 07/30/2019     POC Tests: No results for input(s): POCGLU, POCNA, POCK, POCCL, POCBUN, POCHEMO, POCHCT in the last 72 hours.   Coags  No results found for: PROTIME, INR, APTT  HCG (If Applicable) No results found for: PREGTESTUR, PREGSERUM, HCG, HCGQUANT   ABGs No results found for: PHART, PO2ART, RBY3EPK, ION8LCM, BEART, W4AUVQTC   Type & Screen (If Applicable)  No results found for: [place]    Additional ordered pre-operative testing:  []CBC    []ABG      [] BMP   []URINALYSIS   []CMP    []HCG   []COAGS PT/INR  []T&C  []LFTs   []TYPE AND SCREEN    [] EKG  [] Chest X-Ray  [] Other Radiology    [] Sent to Hospitalist None  [x] Sent to Anesthesia for your review: yes   [] Additional Orders: None Comments:None   Requests: None    Signed: Nhan Pettit LPN 3/63/0318 6:38 AM

## 2020-08-24 ENCOUNTER — HOSPITAL ENCOUNTER (OUTPATIENT)
Dept: LAB | Age: 65
Discharge: HOME | End: 2020-08-24
Payer: MEDICARE

## 2020-08-24 ENCOUNTER — HOSPITAL ENCOUNTER (OUTPATIENT)
Dept: NON INVASIVE DIAGNOSTICS | Age: 65
Discharge: HOME | End: 2020-08-24
Payer: MEDICARE

## 2020-08-24 ENCOUNTER — HOSPITAL ENCOUNTER (OUTPATIENT)
Dept: GENERAL RADIOLOGY | Age: 65
Discharge: HOME | End: 2020-08-26
Payer: MEDICARE

## 2020-08-24 LAB
ABSOLUTE EOS #: 0.05 K/UL (ref 0–0.44)
ABSOLUTE IMMATURE GRANULOCYTE: <0.03 K/UL (ref 0–0.3)
ABSOLUTE LYMPH #: 1.21 K/UL (ref 1.1–3.7)
ABSOLUTE MONO #: 0.31 K/UL (ref 0.1–1.2)
ALBUMIN SERPL-MCNC: 4.3 G/DL (ref 3.5–5.2)
ALBUMIN/GLOBULIN RATIO: 1 (ref 1–2.5)
ALP BLD-CCNC: 68 U/L (ref 40–129)
ALT SERPL-CCNC: 32 U/L (ref 5–41)
ANION GAP SERPL CALCULATED.3IONS-SCNC: 8 MMOL/L (ref 9–17)
AST SERPL-CCNC: 34 U/L
BASOPHILS # BLD: 1 % (ref 0–2)
BASOPHILS ABSOLUTE: 0.03 K/UL (ref 0–0.2)
BILIRUB SERPL-MCNC: 0.58 MG/DL (ref 0.3–1.2)
BUN BLDV-MCNC: 20 MG/DL (ref 8–23)
BUN/CREAT BLD: 18 (ref 9–20)
CALCIUM SERPL-MCNC: 9.3 MG/DL (ref 8.6–10.4)
CHLORIDE BLD-SCNC: 105 MMOL/L (ref 98–107)
CO2: 25 MMOL/L (ref 20–31)
CREAT SERPL-MCNC: 1.13 MG/DL (ref 0.7–1.2)
DIFFERENTIAL TYPE: ABNORMAL
EKG ATRIAL RATE: 55 BPM
EKG P AXIS: 43 DEGREES
EKG P-R INTERVAL: 206 MS
EKG Q-T INTERVAL: 410 MS
EKG QRS DURATION: 108 MS
EKG QTC CALCULATION (BAZETT): 392 MS
EKG R AXIS: 56 DEGREES
EKG T AXIS: 45 DEGREES
EKG VENTRICULAR RATE: 55 BPM
EOSINOPHILS RELATIVE PERCENT: 1 % (ref 1–4)
GFR AFRICAN AMERICAN: >60 ML/MIN
GFR NON-AFRICAN AMERICAN: >60 ML/MIN
GFR SERPL CREATININE-BSD FRML MDRD: ABNORMAL ML/MIN/{1.73_M2}
GFR SERPL CREATININE-BSD FRML MDRD: ABNORMAL ML/MIN/{1.73_M2}
GLUCOSE BLD-MCNC: 97 MG/DL (ref 70–99)
HCT VFR BLD CALC: 43.1 % (ref 40.7–50.3)
HEMOGLOBIN: 15.3 G/DL (ref 13–17)
IMMATURE GRANULOCYTES: 1 %
LYMPHOCYTES # BLD: 28 % (ref 24–43)
MCH RBC QN AUTO: 32.1 PG (ref 25.2–33.5)
MCHC RBC AUTO-ENTMCNC: 35.5 G/DL (ref 25.2–33.5)
MCV RBC AUTO: 90.4 FL (ref 82.6–102.9)
MONOCYTES # BLD: 7 % (ref 3–12)
NRBC AUTOMATED: 0 PER 100 WBC
PDW BLD-RTO: 12.4 % (ref 11.8–14.4)
PLATELET # BLD: 182 K/UL (ref 138–453)
PLATELET ESTIMATE: ABNORMAL
PMV BLD AUTO: 10.3 FL (ref 8.1–13.5)
POTASSIUM SERPL-SCNC: 4.1 MMOL/L (ref 3.7–5.3)
RBC # BLD: 4.77 M/UL (ref 4.21–5.77)
RBC # BLD: ABNORMAL 10*6/UL
SEG NEUTROPHILS: 62 % (ref 36–65)
SEGMENTED NEUTROPHILS ABSOLUTE COUNT: 2.68 K/UL (ref 1.5–8.1)
SODIUM BLD-SCNC: 138 MMOL/L (ref 135–144)
TOTAL PROTEIN: 8.4 G/DL (ref 6.4–8.3)
WBC # BLD: 4.3 K/UL (ref 3.5–11.3)
WBC # BLD: ABNORMAL 10*3/UL

## 2020-08-24 PROCEDURE — 85025 COMPLETE CBC W/AUTO DIFF WBC: CPT

## 2020-08-24 PROCEDURE — 80053 COMPREHEN METABOLIC PANEL: CPT

## 2020-08-24 PROCEDURE — 36415 COLL VENOUS BLD VENIPUNCTURE: CPT

## 2020-08-24 PROCEDURE — 71046 X-RAY EXAM CHEST 2 VIEWS: CPT

## 2020-08-24 PROCEDURE — 93005 ELECTROCARDIOGRAM TRACING: CPT

## 2020-09-01 ENCOUNTER — TELEPHONE (OUTPATIENT)
Dept: PREADMISSION TESTING | Age: 65
End: 2020-09-01

## 2020-09-01 NOTE — TELEPHONE ENCOUNTER
LVM for patient to come to apt scheduled at 7:75 on 9/9/2020. Did not actually schedule in epic due to patient not answering call.

## 2020-09-09 ENCOUNTER — HOSPITAL ENCOUNTER (OUTPATIENT)
Dept: PREADMISSION TESTING | Age: 65
Setting detail: SPECIMEN
Discharge: HOME | End: 2020-09-13
Payer: MEDICARE

## 2020-09-09 PROCEDURE — U0003 INFECTIOUS AGENT DETECTION BY NUCLEIC ACID (DNA OR RNA); SEVERE ACUTE RESPIRATORY SYNDROME CORONAVIRUS 2 (SARS-COV-2) (CORONAVIRUS DISEASE [COVID-19]), AMPLIFIED PROBE TECHNIQUE, MAKING USE OF HIGH THROUGHPUT TECHNOLOGIES AS DESCRIBED BY CMS-2020-01-R: HCPCS

## 2020-09-11 LAB — SARS-COV-2, NAA: NOT DETECTED

## 2020-09-12 ENCOUNTER — TELEPHONE (OUTPATIENT)
Dept: PRIMARY CARE CLINIC | Age: 65
End: 2020-09-12

## 2020-09-14 ENCOUNTER — ANESTHESIA (OUTPATIENT)
Dept: OPERATING ROOM | Age: 65
End: 2020-09-14
Payer: MEDICARE

## 2020-09-14 ENCOUNTER — ANESTHESIA EVENT (OUTPATIENT)
Dept: OPERATING ROOM | Age: 65
End: 2020-09-14
Payer: MEDICARE

## 2020-09-14 ENCOUNTER — HOSPITAL ENCOUNTER (EMERGENCY)
Age: 65
Discharge: HOME | End: 2020-09-14
Attending: EMERGENCY MEDICINE
Payer: MEDICARE

## 2020-09-14 ENCOUNTER — HOSPITAL ENCOUNTER (OUTPATIENT)
Age: 65
Setting detail: OUTPATIENT SURGERY
Discharge: HOME | End: 2020-09-14
Attending: SURGERY | Admitting: SURGERY
Payer: MEDICARE

## 2020-09-14 VITALS
DIASTOLIC BLOOD PRESSURE: 64 MMHG | WEIGHT: 200.2 LBS | RESPIRATION RATE: 16 BRPM | SYSTOLIC BLOOD PRESSURE: 130 MMHG | TEMPERATURE: 97 F | HEIGHT: 72 IN | HEART RATE: 73 BPM | BODY MASS INDEX: 27.12 KG/M2 | OXYGEN SATURATION: 95 %

## 2020-09-14 VITALS
OXYGEN SATURATION: 95 % | DIASTOLIC BLOOD PRESSURE: 75 MMHG | HEART RATE: 66 BPM | SYSTOLIC BLOOD PRESSURE: 129 MMHG | HEIGHT: 72 IN | WEIGHT: 200 LBS | TEMPERATURE: 97.9 F | BODY MASS INDEX: 27.09 KG/M2 | RESPIRATION RATE: 14 BRPM

## 2020-09-14 VITALS
SYSTOLIC BLOOD PRESSURE: 103 MMHG | OXYGEN SATURATION: 96 % | RESPIRATION RATE: 9 BRPM | TEMPERATURE: 94.5 F | DIASTOLIC BLOOD PRESSURE: 59 MMHG

## 2020-09-14 LAB
-: NORMAL
AMORPHOUS: NORMAL
BACTERIA: NORMAL
BILIRUBIN URINE: NEGATIVE
CASTS UA: NORMAL /LPF (ref 0–2)
COLOR: ABNORMAL
COMMENT UA: ABNORMAL
CRYSTALS, UA: NORMAL /HPF
EPITHELIAL CELLS UA: NORMAL /HPF (ref 0–5)
GLUCOSE URINE: NEGATIVE
KETONES, URINE: NEGATIVE
LEUKOCYTE ESTERASE, URINE: NEGATIVE
MUCUS: NORMAL
NITRITE, URINE: NEGATIVE
OTHER OBSERVATIONS UA: NORMAL
PH UA: 5.5 (ref 5–6)
PROTEIN UA: NEGATIVE
RBC UA: NORMAL /HPF (ref 0–4)
RENAL EPITHELIAL, UA: NORMAL /HPF
SPECIFIC GRAVITY UA: 1.01 (ref 1.01–1.02)
TRICHOMONAS: NORMAL
TURBIDITY: ABNORMAL
URINE HGB: ABNORMAL
UROBILINOGEN, URINE: NORMAL
WBC UA: NORMAL /HPF (ref 0–4)
YEAST: NORMAL

## 2020-09-14 PROCEDURE — 2580000003 HC RX 258: Performed by: SURGERY

## 2020-09-14 PROCEDURE — 81001 URINALYSIS AUTO W/SCOPE: CPT

## 2020-09-14 PROCEDURE — 6360000002 HC RX W HCPCS: Performed by: SURGERY

## 2020-09-14 PROCEDURE — 49650 LAP ING HERNIA REPAIR INIT: CPT | Performed by: SURGERY

## 2020-09-14 PROCEDURE — 3700000000 HC ANESTHESIA ATTENDED CARE: Performed by: SURGERY

## 2020-09-14 PROCEDURE — 7100000000 HC PACU RECOVERY - FIRST 15 MIN: Performed by: SURGERY

## 2020-09-14 PROCEDURE — 7100000011 HC PHASE II RECOVERY - ADDTL 15 MIN: Performed by: SURGERY

## 2020-09-14 PROCEDURE — 3600000019 HC SURGERY ROBOT ADDTL 15MIN: Performed by: SURGERY

## 2020-09-14 PROCEDURE — 88304 TISSUE EXAM BY PATHOLOGIST: CPT

## 2020-09-14 PROCEDURE — 7100000001 HC PACU RECOVERY - ADDTL 15 MIN: Performed by: SURGERY

## 2020-09-14 PROCEDURE — 2500000003 HC RX 250 WO HCPCS: Performed by: NURSE ANESTHETIST, CERTIFIED REGISTERED

## 2020-09-14 PROCEDURE — 2500000003 HC RX 250 WO HCPCS: Performed by: SURGERY

## 2020-09-14 PROCEDURE — S2900 ROBOTIC SURGICAL SYSTEM: HCPCS | Performed by: SURGERY

## 2020-09-14 PROCEDURE — 6360000002 HC RX W HCPCS: Performed by: NURSE ANESTHETIST, CERTIFIED REGISTERED

## 2020-09-14 PROCEDURE — 3700000001 HC ADD 15 MINUTES (ANESTHESIA): Performed by: SURGERY

## 2020-09-14 PROCEDURE — 6370000000 HC RX 637 (ALT 250 FOR IP): Performed by: EMERGENCY MEDICINE

## 2020-09-14 PROCEDURE — 3600000009 HC SURGERY ROBOT BASE: Performed by: SURGERY

## 2020-09-14 PROCEDURE — 2709999900 HC NON-CHARGEABLE SUPPLY: Performed by: SURGERY

## 2020-09-14 PROCEDURE — C1781 MESH (IMPLANTABLE): HCPCS | Performed by: SURGERY

## 2020-09-14 PROCEDURE — 99283 EMERGENCY DEPT VISIT LOW MDM: CPT

## 2020-09-14 PROCEDURE — 7100000010 HC PHASE II RECOVERY - FIRST 15 MIN: Performed by: SURGERY

## 2020-09-14 DEVICE — MESH HERN W10XL15CM POLY POLYLACTIC ACID 70% CLLGN 30% GLYC: Type: IMPLANTABLE DEVICE | Site: INGUINAL | Status: FUNCTIONAL

## 2020-09-14 RX ORDER — CYCLOBENZAPRINE HCL 5 MG
5 TABLET ORAL 2 TIMES DAILY PRN
Qty: 10 TABLET | Refills: 0 | Status: SHIPPED | OUTPATIENT
Start: 2020-09-14 | End: 2020-09-24

## 2020-09-14 RX ORDER — LIDOCAINE HYDROCHLORIDE 20 MG/ML
JELLY TOPICAL PRN
Status: DISCONTINUED | OUTPATIENT
Start: 2020-09-14 | End: 2020-09-14 | Stop reason: HOSPADM

## 2020-09-14 RX ORDER — PROPOFOL 10 MG/ML
INJECTION, EMULSION INTRAVENOUS PRN
Status: DISCONTINUED | OUTPATIENT
Start: 2020-09-14 | End: 2020-09-14 | Stop reason: SDUPTHER

## 2020-09-14 RX ORDER — MORPHINE SULFATE 4 MG/ML
4 INJECTION, SOLUTION INTRAMUSCULAR; INTRAVENOUS
Status: DISCONTINUED | OUTPATIENT
Start: 2020-09-14 | End: 2020-09-14 | Stop reason: HOSPADM

## 2020-09-14 RX ORDER — SODIUM CHLORIDE 0.9 % (FLUSH) 0.9 %
10 SYRINGE (ML) INJECTION PRN
Status: DISCONTINUED | OUTPATIENT
Start: 2020-09-14 | End: 2020-09-14 | Stop reason: HOSPADM

## 2020-09-14 RX ORDER — HYDROCODONE BITARTRATE AND ACETAMINOPHEN 5; 325 MG/1; MG/1
1 TABLET ORAL PRN
Status: DISCONTINUED | OUTPATIENT
Start: 2020-09-14 | End: 2020-09-14 | Stop reason: HOSPADM

## 2020-09-14 RX ORDER — ONDANSETRON 2 MG/ML
4 INJECTION INTRAMUSCULAR; INTRAVENOUS EVERY 4 HOURS PRN
Status: DISCONTINUED | OUTPATIENT
Start: 2020-09-14 | End: 2020-09-14 | Stop reason: HOSPADM

## 2020-09-14 RX ORDER — DOCUSATE SODIUM 100 MG/1
100 CAPSULE, LIQUID FILLED ORAL 2 TIMES DAILY PRN
Qty: 30 CAPSULE | Refills: 0 | Status: SHIPPED | OUTPATIENT
Start: 2020-09-14 | End: 2021-08-25

## 2020-09-14 RX ORDER — LIDOCAINE HYDROCHLORIDE 20 MG/ML
INJECTION, SOLUTION EPIDURAL; INFILTRATION; INTRACAUDAL; PERINEURAL PRN
Status: DISCONTINUED | OUTPATIENT
Start: 2020-09-14 | End: 2020-09-14 | Stop reason: SDUPTHER

## 2020-09-14 RX ORDER — SODIUM CHLORIDE, SODIUM LACTATE, POTASSIUM CHLORIDE, CALCIUM CHLORIDE 600; 310; 30; 20 MG/100ML; MG/100ML; MG/100ML; MG/100ML
INJECTION, SOLUTION INTRAVENOUS CONTINUOUS
Status: DISCONTINUED | OUTPATIENT
Start: 2020-09-14 | End: 2020-09-14 | Stop reason: HOSPADM

## 2020-09-14 RX ORDER — FENTANYL CITRATE 50 UG/ML
INJECTION, SOLUTION INTRAMUSCULAR; INTRAVENOUS PRN
Status: DISCONTINUED | OUTPATIENT
Start: 2020-09-14 | End: 2020-09-14 | Stop reason: SDUPTHER

## 2020-09-14 RX ORDER — ONDANSETRON 2 MG/ML
INJECTION INTRAMUSCULAR; INTRAVENOUS PRN
Status: DISCONTINUED | OUTPATIENT
Start: 2020-09-14 | End: 2020-09-14 | Stop reason: SDUPTHER

## 2020-09-14 RX ORDER — MORPHINE SULFATE 2 MG/ML
2 INJECTION, SOLUTION INTRAMUSCULAR; INTRAVENOUS EVERY 30 MIN PRN
Status: DISCONTINUED | OUTPATIENT
Start: 2020-09-14 | End: 2020-09-14 | Stop reason: HOSPADM

## 2020-09-14 RX ORDER — GLYCOPYRROLATE 1 MG/5 ML
SYRINGE (ML) INTRAVENOUS PRN
Status: DISCONTINUED | OUTPATIENT
Start: 2020-09-14 | End: 2020-09-14 | Stop reason: SDUPTHER

## 2020-09-14 RX ORDER — FENTANYL CITRATE 50 UG/ML
50 INJECTION, SOLUTION INTRAMUSCULAR; INTRAVENOUS
Status: DISCONTINUED | OUTPATIENT
Start: 2020-09-14 | End: 2020-09-14 | Stop reason: HOSPADM

## 2020-09-14 RX ORDER — MIDAZOLAM HYDROCHLORIDE 1 MG/ML
INJECTION INTRAMUSCULAR; INTRAVENOUS PRN
Status: DISCONTINUED | OUTPATIENT
Start: 2020-09-14 | End: 2020-09-14 | Stop reason: SDUPTHER

## 2020-09-14 RX ORDER — NEOSTIGMINE METHYLSULFATE 1 MG/ML
INJECTION, SOLUTION INTRAVENOUS PRN
Status: DISCONTINUED | OUTPATIENT
Start: 2020-09-14 | End: 2020-09-14 | Stop reason: SDUPTHER

## 2020-09-14 RX ORDER — ROCURONIUM BROMIDE 10 MG/ML
INJECTION, SOLUTION INTRAVENOUS PRN
Status: DISCONTINUED | OUTPATIENT
Start: 2020-09-14 | End: 2020-09-14 | Stop reason: SDUPTHER

## 2020-09-14 RX ORDER — DIPHENHYDRAMINE HYDROCHLORIDE 50 MG/ML
12.5 INJECTION INTRAMUSCULAR; INTRAVENOUS
Status: DISCONTINUED | OUTPATIENT
Start: 2020-09-14 | End: 2020-09-14 | Stop reason: HOSPADM

## 2020-09-14 RX ORDER — KETOROLAC TROMETHAMINE 30 MG/ML
INJECTION, SOLUTION INTRAMUSCULAR; INTRAVENOUS PRN
Status: DISCONTINUED | OUTPATIENT
Start: 2020-09-14 | End: 2020-09-14 | Stop reason: SDUPTHER

## 2020-09-14 RX ORDER — DEXAMETHASONE SODIUM PHOSPHATE 4 MG/ML
INJECTION, SOLUTION INTRA-ARTICULAR; INTRALESIONAL; INTRAMUSCULAR; INTRAVENOUS; SOFT TISSUE PRN
Status: DISCONTINUED | OUTPATIENT
Start: 2020-09-14 | End: 2020-09-14 | Stop reason: SDUPTHER

## 2020-09-14 RX ORDER — HYDROCODONE BITARTRATE AND ACETAMINOPHEN 5; 325 MG/1; MG/1
1 TABLET ORAL EVERY 6 HOURS PRN
Qty: 28 TABLET | Refills: 0 | Status: SHIPPED | OUTPATIENT
Start: 2020-09-14 | End: 2020-09-21

## 2020-09-14 RX ORDER — PROMETHAZINE HYDROCHLORIDE 25 MG/ML
25 INJECTION, SOLUTION INTRAMUSCULAR; INTRAVENOUS EVERY 4 HOURS PRN
Status: DISCONTINUED | OUTPATIENT
Start: 2020-09-14 | End: 2020-09-14 | Stop reason: HOSPADM

## 2020-09-14 RX ORDER — SODIUM CHLORIDE 0.9 % (FLUSH) 0.9 %
10 SYRINGE (ML) INJECTION EVERY 12 HOURS SCHEDULED
Status: DISCONTINUED | OUTPATIENT
Start: 2020-09-14 | End: 2020-09-14 | Stop reason: HOSPADM

## 2020-09-14 RX ORDER — FENTANYL CITRATE 50 UG/ML
25 INJECTION, SOLUTION INTRAMUSCULAR; INTRAVENOUS EVERY 30 MIN PRN
Status: DISCONTINUED | OUTPATIENT
Start: 2020-09-14 | End: 2020-09-14 | Stop reason: HOSPADM

## 2020-09-14 RX ORDER — HYDROCODONE BITARTRATE AND ACETAMINOPHEN 5; 325 MG/1; MG/1
2 TABLET ORAL PRN
Status: DISCONTINUED | OUTPATIENT
Start: 2020-09-14 | End: 2020-09-14 | Stop reason: HOSPADM

## 2020-09-14 RX ORDER — PHENYLEPHRINE HYDROCHLORIDE 10 MG/ML
INJECTION INTRAVENOUS PRN
Status: DISCONTINUED | OUTPATIENT
Start: 2020-09-14 | End: 2020-09-14 | Stop reason: SDUPTHER

## 2020-09-14 RX ADMIN — PHENYLEPHRINE HYDROCHLORIDE 100 MCG: 10 INJECTION INTRAVENOUS at 10:15

## 2020-09-14 RX ADMIN — SODIUM CHLORIDE, POTASSIUM CHLORIDE, SODIUM LACTATE AND CALCIUM CHLORIDE: 600; 310; 30; 20 INJECTION, SOLUTION INTRAVENOUS at 11:24

## 2020-09-14 RX ADMIN — ROCURONIUM BROMIDE 40 MG: 10 INJECTION, SOLUTION INTRAVENOUS at 09:35

## 2020-09-14 RX ADMIN — PHENYLEPHRINE HYDROCHLORIDE 100 MCG: 10 INJECTION INTRAVENOUS at 10:41

## 2020-09-14 RX ADMIN — SODIUM CHLORIDE, POTASSIUM CHLORIDE, SODIUM LACTATE AND CALCIUM CHLORIDE: 600; 310; 30; 20 INJECTION, SOLUTION INTRAVENOUS at 07:44

## 2020-09-14 RX ADMIN — FENTANYL CITRATE 50 MCG: 50 INJECTION, SOLUTION INTRAMUSCULAR; INTRAVENOUS at 09:31

## 2020-09-14 RX ADMIN — PHENYLEPHRINE HYDROCHLORIDE 100 MCG: 10 INJECTION INTRAVENOUS at 11:00

## 2020-09-14 RX ADMIN — PHENYLEPHRINE HYDROCHLORIDE 100 MCG: 10 INJECTION INTRAVENOUS at 10:53

## 2020-09-14 RX ADMIN — PROPOFOL 200 MG: 10 INJECTION, EMULSION INTRAVENOUS at 09:35

## 2020-09-14 RX ADMIN — PHENYLEPHRINE HYDROCHLORIDE 100 MCG: 10 INJECTION INTRAVENOUS at 10:45

## 2020-09-14 RX ADMIN — ONDANSETRON 4 MG: 2 INJECTION INTRAMUSCULAR; INTRAVENOUS at 11:14

## 2020-09-14 RX ADMIN — PHENYLEPHRINE HYDROCHLORIDE 100 MCG: 10 INJECTION INTRAVENOUS at 09:47

## 2020-09-14 RX ADMIN — KETOROLAC TROMETHAMINE 30 MG: 30 INJECTION, SOLUTION INTRAMUSCULAR; INTRAVENOUS at 11:13

## 2020-09-14 RX ADMIN — PHENYLEPHRINE HYDROCHLORIDE 100 MCG: 10 INJECTION INTRAVENOUS at 10:09

## 2020-09-14 RX ADMIN — PHENYLEPHRINE HYDROCHLORIDE 100 MCG: 10 INJECTION INTRAVENOUS at 11:15

## 2020-09-14 RX ADMIN — ROCURONIUM BROMIDE 10 MG: 10 INJECTION, SOLUTION INTRAVENOUS at 10:47

## 2020-09-14 RX ADMIN — DEXAMETHASONE SODIUM PHOSPHATE 4 MG: 4 INJECTION, SOLUTION INTRAMUSCULAR; INTRAVENOUS at 09:44

## 2020-09-14 RX ADMIN — Medication 0.2 MG: at 10:16

## 2020-09-14 RX ADMIN — LIDOCAINE HYDROCHLORIDE: 20 JELLY TOPICAL at 19:15

## 2020-09-14 RX ADMIN — MIDAZOLAM HYDROCHLORIDE 2 MG: 1 INJECTION, SOLUTION INTRAMUSCULAR; INTRAVENOUS at 09:31

## 2020-09-14 RX ADMIN — LIDOCAINE HYDROCHLORIDE 100 MG: 20 INJECTION, SOLUTION EPIDURAL; INFILTRATION; INTRACAUDAL; PERINEURAL at 09:35

## 2020-09-14 RX ADMIN — PHENYLEPHRINE HYDROCHLORIDE 100 MCG: 10 INJECTION INTRAVENOUS at 10:33

## 2020-09-14 RX ADMIN — Medication 0.4 MG: at 11:34

## 2020-09-14 RX ADMIN — PHENYLEPHRINE HYDROCHLORIDE 100 MCG: 10 INJECTION INTRAVENOUS at 11:22

## 2020-09-14 RX ADMIN — SODIUM CHLORIDE, POTASSIUM CHLORIDE, SODIUM LACTATE AND CALCIUM CHLORIDE: 600; 310; 30; 20 INJECTION, SOLUTION INTRAVENOUS at 10:13

## 2020-09-14 RX ADMIN — PHENYLEPHRINE HYDROCHLORIDE 100 MCG: 10 INJECTION INTRAVENOUS at 11:11

## 2020-09-14 RX ADMIN — Medication 2 MG: at 11:34

## 2020-09-14 RX ADMIN — PHENYLEPHRINE HYDROCHLORIDE 100 MCG: 10 INJECTION INTRAVENOUS at 11:03

## 2020-09-14 RX ADMIN — Medication 2 G: at 09:34

## 2020-09-14 ASSESSMENT — PULMONARY FUNCTION TESTS
PIF_VALUE: 35
PIF_VALUE: 6
PIF_VALUE: 32
PIF_VALUE: 2
PIF_VALUE: 34
PIF_VALUE: 35
PIF_VALUE: 35
PIF_VALUE: 34
PIF_VALUE: 35
PIF_VALUE: 21
PIF_VALUE: 33
PIF_VALUE: 35
PIF_VALUE: 23
PIF_VALUE: 6
PIF_VALUE: 34
PIF_VALUE: 34
PIF_VALUE: 35
PIF_VALUE: 28
PIF_VALUE: 35
PIF_VALUE: 32
PIF_VALUE: 35
PIF_VALUE: 33
PIF_VALUE: 35
PIF_VALUE: 35
PIF_VALUE: 37
PIF_VALUE: 34
PIF_VALUE: 2
PIF_VALUE: 18
PIF_VALUE: 20
PIF_VALUE: 36
PIF_VALUE: 2
PIF_VALUE: 35
PIF_VALUE: 31
PIF_VALUE: 35
PIF_VALUE: 19
PIF_VALUE: 22
PIF_VALUE: 35
PIF_VALUE: 9
PIF_VALUE: 35
PIF_VALUE: 36
PIF_VALUE: 36
PIF_VALUE: 35
PIF_VALUE: 34
PIF_VALUE: 35
PIF_VALUE: 36
PIF_VALUE: 22
PIF_VALUE: 2
PIF_VALUE: 35
PIF_VALUE: 32
PIF_VALUE: 36
PIF_VALUE: 35
PIF_VALUE: 36
PIF_VALUE: 27
PIF_VALUE: 28
PIF_VALUE: 31
PIF_VALUE: 34
PIF_VALUE: 35
PIF_VALUE: 21
PIF_VALUE: 35
PIF_VALUE: 34
PIF_VALUE: 35
PIF_VALUE: 34
PIF_VALUE: 34
PIF_VALUE: 35
PIF_VALUE: 35
PIF_VALUE: 34
PIF_VALUE: 37
PIF_VALUE: 34
PIF_VALUE: 34
PIF_VALUE: 35
PIF_VALUE: 35
PIF_VALUE: 34
PIF_VALUE: 35
PIF_VALUE: 34
PIF_VALUE: 35
PIF_VALUE: 35
PIF_VALUE: 21
PIF_VALUE: 34
PIF_VALUE: 35
PIF_VALUE: 34
PIF_VALUE: 4
PIF_VALUE: 2
PIF_VALUE: 8
PIF_VALUE: 4
PIF_VALUE: 22
PIF_VALUE: 35
PIF_VALUE: 21
PIF_VALUE: 36
PIF_VALUE: 34
PIF_VALUE: 34
PIF_VALUE: 35
PIF_VALUE: 34
PIF_VALUE: 34
PIF_VALUE: 35
PIF_VALUE: 15
PIF_VALUE: 35
PIF_VALUE: 34
PIF_VALUE: 22
PIF_VALUE: 34
PIF_VALUE: 35
PIF_VALUE: 35
PIF_VALUE: 34
PIF_VALUE: 35
PIF_VALUE: 35
PIF_VALUE: 21
PIF_VALUE: 34
PIF_VALUE: 37
PIF_VALUE: 2
PIF_VALUE: 35
PIF_VALUE: 35
PIF_VALUE: 31
PIF_VALUE: 34
PIF_VALUE: 2
PIF_VALUE: 35
PIF_VALUE: 35
PIF_VALUE: 21
PIF_VALUE: 34
PIF_VALUE: 6
PIF_VALUE: 31
PIF_VALUE: 22
PIF_VALUE: 34
PIF_VALUE: 21
PIF_VALUE: 35
PIF_VALUE: 8
PIF_VALUE: 35
PIF_VALUE: 21
PIF_VALUE: 21
PIF_VALUE: 22
PIF_VALUE: 35

## 2020-09-14 ASSESSMENT — PAIN SCALES - GENERAL
PAINLEVEL_OUTOF10: 0
PAINLEVEL_OUTOF10: 4
PAINLEVEL_OUTOF10: 0
PAINLEVEL_OUTOF10: 0
PAINLEVEL_OUTOF10: 8
PAINLEVEL_OUTOF10: 0

## 2020-09-14 ASSESSMENT — PAIN DESCRIPTION - DESCRIPTORS
DESCRIPTORS: SHARP
DESCRIPTORS: CRAMPING
DESCRIPTORS: CRAMPING

## 2020-09-14 ASSESSMENT — PAIN DESCRIPTION - PAIN TYPE
TYPE: SURGICAL PAIN

## 2020-09-14 ASSESSMENT — PAIN DESCRIPTION - LOCATION
LOCATION: ABDOMEN

## 2020-09-14 ASSESSMENT — PAIN DESCRIPTION - ONSET: ONSET: ON-GOING

## 2020-09-14 ASSESSMENT — PAIN DESCRIPTION - FREQUENCY: FREQUENCY: CONTINUOUS

## 2020-09-14 ASSESSMENT — PAIN DESCRIPTION - PROGRESSION: CLINICAL_PROGRESSION: GRADUALLY WORSENING

## 2020-09-14 ASSESSMENT — PAIN - FUNCTIONAL ASSESSMENT: PAIN_FUNCTIONAL_ASSESSMENT: 0-10

## 2020-09-14 ASSESSMENT — ENCOUNTER SYMPTOMS
VOMITING: 0
DIARRHEA: 0

## 2020-09-14 NOTE — ED PROVIDER NOTES
[facility] ED  [address]  [place] [address]  Phone: [phone]        Pt Name: Kaya Scales  MRN: [id number]  Pardeeptrongfshonda 1955  Date of evaluation: 9/14/20      CHIEF COMPLAINT     Chief Complaint   Patient presents with    Dysuria     had hernia repair surgery today per Dr. Adalberto Morillo, pt has only been able to urinate dribbles at a time since his discharge home around 1300 today         HISTORY OF PRESENT ILLNESS  (Location/Symptom, Timing/Onset, Context/Setting, Quality, Duration, Modifying Factors, Severity.)    Kaya Scales is a 76 y.o. male who presents with an inability to urinate. The patient dates he has an enlarged prostate earlier today he had hernia repair surgery by Dr. Adalberto Morillo and since being released from the hospital he has not been able to urinate states he has some mild abdominal discomfort from the surgery otherwise no fever no chills only able to get small dribbles out has the urge to urinate but is not able to urinate no vomiting no diarrhea      REVIEW OF SYSTEMS    (2-9 systems for level 4, 10 or more for level 5)     Review of Systems   Constitutional: Negative for chills and fever. Gastrointestinal: Negative for diarrhea and vomiting. Genitourinary: Positive for dysuria. Negative for penile pain, scrotal swelling and testicular pain. PAST MEDICAL HISTORY    has a past medical history of BPH (benign prostatic hypertrophy), Carpal tunnel syndrome, bilateral, Cervical disc disorder, Footdrop, Herpes simplex labialis, Hyperlipidemia, Hypothyroidism, Mumps, Neck pain, Neuropathy, Paresthesias, Peripheral neuropathy, Pneumonia, and Ulnar neuropathy of right upper extremity. SURGICAL HISTORY      has a past surgical history that includes Mouth surgery; Toe Surgery; Colonoscopy (2009); Carpal tunnel release (Right, feb 2014); other surgical history; and Inguinal hernia repair (Left, 9/14/2020).     CURRENTMEDICATIONS       Previous Medications    ASCORBIC ACID (VITAMIN C) 250 MG TABLET    Take 500 mg by mouth daily. B COMPLEX VITAMINS CAPSULE    Take 1 capsule by mouth daily. CYCLOBENZAPRINE (FLEXERIL) 5 MG TABLET    Take 1 tablet by mouth 2 times daily as needed for Muscle spasms    DOCUSATE SODIUM (COLACE) 100 MG CAPSULE    Take 1 capsule by mouth 2 times daily as needed for Constipation (use while taking narcotic pain meds)    HYDROCODONE-ACETAMINOPHEN (NORCO) 5-325 MG PER TABLET    Take 1 tablet by mouth every 6 hours as needed for Pain for up to 7 days. Intended supply: 7 days. Take lowest dose possible to manage pain    OMEGA-3 FATTY ACIDS (FISH OIL) 1000 MG CAPS    Take 1,000 mg by mouth daily. TADALAFIL (CIALIS) 5 MG TABLET    TAKE 1 TABLET DAILY as needed    TAMSULOSIN (FLOMAX) 0.4 MG CAPSULE    TAKE 1 CAPSULE ONCE A DAY    VALACYCLOVIR (VALTREX) 1 G TABLET    Take 1 tablet by mouth 2 times daily    VITAMIN B-12 (CYANOCOBALAMIN) 1000 MCG TABLET    Take 1,000 mcg by mouth daily. VITAMIN D (CHOLECALCIFEROL) 1000 UNITS CAPS CAPSULE    Take 1,000 Units by mouth daily. ALLERGIES     has No Known Allergies. FAMILY HISTORY     He indicated that his mother is . He indicated that his father is . He indicated that his maternal grandmother is . He indicated that his maternal grandfather is . He indicated that his paternal grandmother is . He indicated that his paternal grandfather is . He indicated that the status of his neg hx is unknown.     family history includes Cancer in his father and mother; Heart Disease in his mother. SOCIAL HISTORY      reports that he quit smoking about 15 years ago. His smoking use included cigarettes. He has a 25.00 pack-year smoking history. He has never used smokeless tobacco. He reports current alcohol use of about 7.0 standard drinks of alcohol per week. He reports that he does not use drugs.     PHYSICAL EXAM    (up to 7 for level 4, 8 or more for level 5) INITIAL VITALS:  height is 6' (1.829 m) and weight is 200 lb (90.7 kg). His tympanic temperature is 97.9 °F (36.6 °C). His blood pressure is 178/83 (abnormal) and his pulse is 92. His respiration is 14 and oxygen saturation is 95%. Physical Exam  Vitals signs and nursing note reviewed. Exam conducted with a chaperone present. Constitutional:       Appearance: Normal appearance. HENT:      Head: Normocephalic and atraumatic. Eyes:      Conjunctiva/sclera: Conjunctivae normal.   Neck:      Musculoskeletal: Normal range of motion and neck supple. Cardiovascular:      Rate and Rhythm: Normal rate and regular rhythm. Pulmonary:      Effort: Pulmonary effort is normal.      Breath sounds: Normal breath sounds. Abdominal:      General: Bowel sounds are normal.      Palpations: Abdomen is soft. Comments: Postsurgical incision sites on the abdomen with some mild tenderness positive bowel sounds are still present no peritoneal findings   Genitourinary:     Penis: Normal.       Scrotum/Testes: Normal.   Musculoskeletal: Normal range of motion. Skin:     Comments: Postsurgical incisions on the abdomen without any signs of infection   Neurological:      General: No focal deficit present. Mental Status: He is alert. DIFFERENTIAL DIAGNOSIS/ MDM:     Given that the patient has a known enlarged prostate and has previously had dysuria we will place a Patel catheter and check a UA    DIAGNOSTIC RESULTS         LABS:  No results found for this visit on 09/14/20.     Pending    EMERGENCY DEPARTMENT COURSE:   Vitals:    Vitals:    09/14/20 1828   BP: (!) 178/83   Pulse: 92   Resp: 14   Temp: 97.9 °F (36.6 °C)   TempSrc: Tympanic   SpO2: 95%   Weight: 200 lb (90.7 kg)   Height: 6' (1.829 m)     -------------------------  BP: (!) 178/83, Temp: 97.9 °F (36.6 °C), Pulse: 92, Resp: 14        CONSULTS:  Dr. Sara Laguerre will take over care of the patient per shift change    PROCEDURES:  None    FINAL IMPRESSION    No diagnosis found. DISPOSITION/PLAN   DISPOSITION            PATIENT REFERRED TO:  No follow-up provider specified. DISCHARGE MEDICATIONS:  New Prescriptions    No medications on file       (Please note that portions of this note were completed with a voicerecognition program.  Efforts were made to edit the dictations but occasionally words are mis-transcribed.)    Waller MD, F.A.C.E.P.   Attending Emergency Medicine Physician       Jean Odom MD  09/14/20 9165

## 2020-09-14 NOTE — OP NOTE
Operative Note      Patient: Sabrina Good  YOB: 1955  MRN: [id number]    Date of Procedure: 9/14/2020    Pre-Op Diagnosis: Bilateral Inguinal Hernias    Post-Op Diagnosis: same, L>R       Procedure(s):  Laparoscopic Robotic Assisted Left Inguinal Hernia Repair    Surgeon(s):  Saadia Gale DO    Assistant:   * No surgical staff found *    Anesthesia: General    Estimated Blood Loss (mL): less than 5cc    Complications: None    Specimens:   ID Type Source Tests Collected by Time Destination   A : Cord Lipoma  Tissue Spermatic Cord SURGICAL PATHOLOGY Saadia Gale DO 9/14/2020 1137        Implants:  Implant Name Type Inv. Item Serial No.  Lot No. LRB No. Used Action   MESH PROGRIP LAPSCP Mesh MESH PROGRIP LAPSCP  [facility] [id number] Left 1 Implanted         Drains:   [REMOVED] Urethral Catheter Double-lumen 16 fr (Removed)       Findings: as above, wound class 1    Detailed Description of Procedure:        HISTORY: The patient is a 76y.o. year old male with history of above preop diagnosis. The risk, benefits, alternatives, complications and procedure details were explained to the patient, written informed consent was obtained. Description of procedure:    Patient taken to operative suite and placed in supine position. Timeout was performed verifying correct patient, position, equipment and procedure to be performed. Preoperative antibiotics were administered, EPC cuffs were applied. General anesthesia was administered and endotracheal intubated performed without difficulty. The abdomen including genitalia was prepped and draped in the usual sterile fashion. Stab incision was made in the left upper quadrant at [place] point and Veress needle was inserted. A saline drop test was used to confirm entrance into the abdomen. Pneumoperitoneum was created with insufflation of carbon dioxide to 15 mmHg.   An 8 mmHg port was placed in the left abdomen using optiview with no damage to the underlying structures. Remaining ports were placd under direct visualization along the same line in the mid abdomen and the right abdomen. Robot was docked without complication. A 30 degree scope was placed into the abdomen. Both inguinal regions were inspected and the median umbilical ligament, medial umbilical ligaments, and lateral umbilical folds were identified. Bilateral indirect inguinal hernias noted, patient agreed to only left side repair today as the right side was asympomatic at this time. The peritoneum was incised with scissor electrocautery along a line 2 cm above the superior edge of the hernia defect, extending from the medial umbilical ligament to the anterior superior iliac spine. The peritoneal flap was mobilized inferiorly using blunt dissection. Blunt dissection was down from the ASIS to the lateral edge of the internal ring. The inferior epigastric vessels were exposed and kept superior to the dissection planes at all times during the operation. Medial dissection was done until Manoj's ligament was exposed. The cord structures and hernia sac were identified. Cord structures were preserved during the dissection and reduction of hernia sac. The cord structures were dissected free from the hernia sac circumferentially. The hernia sac containing fat was reduced. Hemostasis was maintained. Once dissection was completed, a 15 cm x 9 cm piece Progrip mesh was rolled double scroll. It was placed within the peritoneum flap and centered over the deep inguinal ring cover direct and indirect hernia spaces. It was noted to lay flat, in good position and without crimpage. The peritoneum was closed with running absorbable 3-0 V lock suture. Robot was undocked without complications. The 8mm robotic ports were removed under direct visualization. No bleeding was noted. Skin incisions were closed with subcuticular 4-0 Monocryl suture.  Combination of 1% lidocaine and 0.25%

## 2020-09-14 NOTE — H&P
[facility]      Patient's Name/ Date of Birth/ Gender: Fuentes Chowdhury / 1955 [de-identified]76 y.o.) / male     Attending physician: Maryan Maria DO    CC: bilateral inguinal hernias    History of present Illness: Fuentes Chowdhury is a 76 y.o. male, presents today for [facility]. Past Medical History:  has a past medical history of BPH (benign prostatic hypertrophy), Carpal tunnel syndrome, bilateral, Cervical disc disorder, Footdrop, Herpes simplex labialis, Hyperlipidemia, Hypothyroidism, Mumps, Neck pain, Neuropathy, Paresthesias, Peripheral neuropathy, Pneumonia, and Ulnar neuropathy of right upper extremity. Past Surgical History:   Past Surgical History:   Procedure Laterality Date    CARPAL TUNNEL RELEASE Right feb 2014    also ulnar nerve r elbow     COLONOSCOPY  2009    MOUTH SURGERY      peridontal    OTHER SURGICAL HISTORY      IVIG INFUSIONS STARTING NOVEMBER OF 2015 AND EVERY 3 MONTH SINCE    TOE SURGERY         Social History:  reports that he quit smoking about 15 years ago. His smoking use included cigarettes. He has a 25.00 pack-year smoking history. He has never used smokeless tobacco. He reports current alcohol use of about 7.0 standard drinks of alcohol per week. He reports that he does not use drugs. Family History: family history includes Cancer in his father and mother; Heart Disease in his mother. Review of Systems:   General: Denies fever, chills, night sweats, weight loss, malaise, fatigue  HEENT: Denies sore throat, sinus problems, allergic rhinosinusitis  Card: Denies chest pain, palpitations, orthopnea/PND. Denies h/o murmurs  Pulm: Denies cough, shortness of breath, DAVIS  GI:  Denies history of constipation, diarrhea, hematochezia or melena  : Denies polyuria, dysuria, hematuria  Endo: Denies diabetes, thyroid problems. Heme: Denies anemia, h/o bleeding or clotting problems.    Neuro: Denies h/o CVA, TIA  Skin: Denies rashes, ulcers  Musculoskeletal: symptomatic L groin bulge    Allergies: Patient has no known allergies. Current Meds:  Current Facility-Administered Medications:     lactated ringers infusion, , Intravenous, Continuous, Alejandra Jamison DO, Last Rate: 25 mL/hr at 09/14/20 0744    sodium chloride flush 0.9 % injection 10 mL, 10 mL, Intravenous, 2 times per day, Alejandra Jamison DO    sodium chloride flush 0.9 % injection 10 mL, 10 mL, Intravenous, PRN, Alejandra Jamison DO    ceFAZolin (ANCEF) 2 g in sterile water 20 mL IV syringe, 2 g, Intravenous, On Call to OR, Alejandra Jamison DO    Vital Signs:  Vitals:    09/14/20 0739   BP: (!) 141/77   Pulse: 61   Resp: 16   Temp: 96.9 °F (36.1 °C)   SpO2: 96%       Physical Exam:  Vital signs and Nurse's note reviewed  Gen:  A&Ox3, NAD  HEENT: NCAT, PERRLA, EOMI, no scleral icterus, oral mucosa moist  Neck: Trachea midline without obvious masses or lesions  Chest: Symmetric rise with inhalation, no evidence of trauma  CVS: S1S2  Resp: Good bilateral air entry, no audible wheeze or rhonchi  Abd: soft, non-tender, non-distended, no hepatosplenomegaly or palpable masses. Reducible L groin bulge  Ext: No clubbing, cyanosis, edema, peripheral pulses 2+ Rad/Fem/DP/PT  CNS: Moves all extremities, no gross focal motor deficits  Skin: No erythema or ulcerations         Assessment:    Kaya Scales is a 76 y.o. male with bilateral inguinal hernias    Plan:    1. Discussed with pt, R side is asymptomatic at this time so will defer repair at this time, proceed with [facility] repair.  To OR for [facility] repair, all questions answered, written informed consent obtained      Alejandra Jamison  9/14/2020

## 2020-09-14 NOTE — ANESTHESIA PRE PROCEDURE
Department of Anesthesiology  Preprocedure Note       Name:  Grant Norton   Age:  76 y.o.  :  1955                                          MRN:  [id number]         Date:  2020      Surgeon: Ariel Negron):  Cande Avery DO    Procedure: Procedure(s):  Laparoscopic Robotic Assisted Bilateral Inguinal Hernia Repairs    Medications prior to admission:   Prior to Admission medications    Medication Sig Start Date End Date Taking? Authorizing Provider   valACYclovir (VALTREX) 1 g tablet Take 1 tablet by mouth 2 times daily 20   Diaz Saleem MD   tadalafil (CIALIS) 5 MG tablet TAKE 1 TABLET DAILY as needed 20   Diaz Saleem MD   tamsulosin (FLOMAX) 0.4 MG capsule TAKE 1 CAPSULE ONCE A DAY 3/4/20   Jeromy Tenorio MD   b complex vitamins capsule Take 1 capsule by mouth daily. Historical Provider, MD   Ascorbic Acid (VITAMIN C) 250 MG tablet Take 500 mg by mouth daily. Historical Provider, MD   Omega-3 Fatty Acids (FISH OIL) 1000 MG CAPS Take 1,000 mg by mouth daily. Historical Provider, MD   Vitamin D (CHOLECALCIFEROL) 1000 UNITS CAPS capsule Take 1,000 Units by mouth daily. Historical Provider, MD   vitamin B-12 (CYANOCOBALAMIN) 1000 MCG tablet Take 1,000 mcg by mouth daily.     Historical Provider, MD       Current medications:    Current Facility-Administered Medications   Medication Dose Route Frequency Provider Last Rate Last Dose    lactated ringers infusion   Intravenous Continuous Cande Avery DO 25 mL/hr at 20 0744      sodium chloride flush 0.9 % injection 10 mL  10 mL Intravenous 2 times per day Cande Avery DO        sodium chloride flush 0.9 % injection 10 mL  10 mL Intravenous PRN Cande Avery DO        ceFAZolin (ANCEF) 2 g in sterile water 20 mL IV syringe  2 g Intravenous [facility]           Allergies:  No Known Allergies    Problem List:    Patient Active Problem List   Diagnosis Code    Footdrop M21.379    Hyperlipidemia E78.5    Ulnar neuropathy of right upper extremity G56.21    Cervical disc disorder M50.90    ED (erectile dysfunction) N52.9    Herpes simplex labialis B00.1    Benign prostatic hyperplasia with lower urinary tract symptoms N40.1    Right brachial plexitis G54.0    CIDP (chronic inflammatory demyelinating polyneuropathy) ([facility]) G61.81       Past Medical History:        Diagnosis Date    BPH (benign prostatic hypertrophy)     with outlet symptoms    Carpal tunnel syndrome, bilateral     Cervical disc disorder     Footdrop     Left    Herpes simplex labialis     episodic    Hyperlipidemia     Hypothyroidism     Mumps     Neck pain     Neuropathy     Paresthesias     Peripheral neuropathy     Pneumonia     history of    Ulnar neuropathy of right upper extremity        Past Surgical History:        Procedure Laterality Date    CARPAL TUNNEL RELEASE Right feb 2014    also ulnar nerve r elbow     COLONOSCOPY  2009    MOUTH SURGERY      peridontal    OTHER SURGICAL HISTORY      IVIG INFUSIONS STARTING NOVEMBER OF 2015 AND EVERY 3 MONTH SINCE    TOE SURGERY         Social History:    Social History     Tobacco Use    Smoking status: Former Smoker     Packs/day: 1.00     Years: 25.00     Pack years: 25.00     Types: Cigarettes     Last attempt to quit: 1/1/2005     Years since quitting: 15.7    Smokeless tobacco: Never Used    Tobacco comment: smoked for 25 years, stopped 2005. 1 pack daily   Substance Use Topics    Alcohol use: Yes     Alcohol/week: 7.0 standard drinks     Types: 7 Cans of beer per week     Comment: 1 daily                                Counseling given: Not Answered  Comment: smoked for 25 years, stopped 2005.  1 pack daily      Vital Signs (Current):   Vitals:    09/14/20 0739   BP: (!) 141/77   Pulse: 61   Resp: 16   Temp: 36.1 °C (96.9 °F)   TempSrc: Temporal   SpO2: 96%   Weight: 200 lb 3.2 oz (90.8 kg)   Height: 6' (1.829 m)                                              BP Readings from Last 3 Encounters:   09/14/20 (!) 141/77   08/10/20 122/70   03/18/20 102/60       NPO Status: Time of last liquid consumption: 0000                        Time of last solid consumption: 1800                        Date of last liquid consumption: 09/14/20                        Date of last solid food consumption: 09/13/20    BMI:   Wt Readings from Last 3 Encounters:   09/14/20 200 lb 3.2 oz (90.8 kg)   08/10/20 196 lb (88.9 kg)   03/18/20 198 lb (89.8 kg)     Body mass index is 27.15 kg/m². CBC:   Lab Results   Component Value Date    WBC 4.3 08/24/2020    RBC 4.77 08/24/2020    HGB 15.3 08/24/2020    HCT 43.1 08/24/2020    MCV 90.4 08/24/2020    RDW 12.4 08/24/2020     08/24/2020       CMP:   Lab Results   Component Value Date     08/24/2020    K 4.1 08/24/2020     08/24/2020    CO2 25 08/24/2020    BUN 20 08/24/2020    CREATININE 1.13 08/24/2020    GFRAA >60 08/24/2020    LABGLOM >60 08/24/2020    GLUCOSE 97 08/24/2020    PROT 8.4 08/24/2020    CALCIUM 9.3 08/24/2020    BILITOT 0.58 08/24/2020    ALKPHOS 68 08/24/2020    AST 34 08/24/2020    ALT 32 08/24/2020       POC Tests: No results for input(s): POCGLU, POCNA, POCK, POCCL, POCBUN, POCHEMO, POCHCT in the last 72 hours.     Coags: No results found for: PROTIME, INR, APTT    HCG (If Applicable): No results found for: PREGTESTUR, PREGSERUM, HCG, HCGQUANT     ABGs: No results found for: PHART, PO2ART, XSB8ILC, WUK8KFD, BEART, T8FRWWXI     Type & Screen (If Applicable):  No results found for: LABABO, LABRH    Drug/Infectious Status (If Applicable):  Lab Results   Component Value Date    HEPCAB NONREACTIVE 08/11/2016       COVID-19 Screening (If Applicable):   Lab Results   Component Value Date    COVID19 Not Detected 09/09/2020         Anesthesia Evaluation  Patient summary reviewed no history of anesthetic complications:   Airway: Mallampati: II  TM distance: >3 FB   Neck ROM: full  Mouth opening: > = 3 FB Dental:    (+) caps

## 2020-09-15 ENCOUNTER — TELEPHONE (OUTPATIENT)
Dept: SURGERY | Age: 65
End: 2020-09-15

## 2020-09-15 ENCOUNTER — TELEPHONE (OUTPATIENT)
Dept: UROLOGY | Age: 65
End: 2020-09-15

## 2020-09-15 NOTE — TELEPHONE ENCOUNTER
Patient had a left inguinal hernia repair by Dr. Valentino Sayers on 9/14/2020 and was later seen in the [facility] ER for urinary retention. Patient was discharged with a catheter and instructed to follow up with Dr. Abdifatah Fuentes in 3 days. Please call patient back at [phone].

## 2020-09-15 NOTE — ED PROVIDER NOTES
Vw)    Result Date: 8/24/2020  EXAMINATION: TWO XRAY VIEWS OF THE CHEST 8/24/2020 11:06 am COMPARISON: None. HISTORY: ORDERING SYSTEM PROVIDED HISTORY: Pre-op testing TECHNOLOGIST PROVIDED HISTORY: Reason for Exam: Pre-op chest, no current chest complaints Acuity: Unknown Type of Exam: Initial FINDINGS: The lungs are without acute focal process. No effusion or pneumothorax. The cardiomediastinal silhouette is normal.  The osseous structures are intact without acute process. No acute process. LABS:  Results for orders placed or performed during the hospital encounter of 09/14/20   Urinalysis Reflex to Culture    Specimen: Urine, indwelling catheter   Result Value Ref Range    Color, UA NOT REPORTED YELLOW    Turbidity UA NOT REPORTED CLEAR    Glucose, Ur NEGATIVE NEGATIVE    Bilirubin Urine NEGATIVE NEGATIVE    Ketones, Urine NEGATIVE NEGATIVE    Specific Gravity, UA 1.015 1.010 - 1.025    Urine Hgb 2+ (A) NEGATIVE    pH, UA 5.5 5.0 - 6.0    Protein, UA NEGATIVE NEGATIVE    Urobilinogen, Urine Normal Normal    Nitrite, Urine NEGATIVE NEGATIVE    Leukocyte Esterase, Urine NEGATIVE NEGATIVE    Urinalysis Comments NOT REPORTED    Microscopic Urinalysis   Result Value Ref Range    -          WBC, UA 0 TO 4 0 - 4 /HPF    RBC, UA 5 TO 10 0 - 4 /HPF    Casts UA NOT REPORTED 0 - 2 /LPF    Crystals, UA NOT REPORTED None /HPF    Epithelial Cells UA None 0 - 5 /HPF    Renal Epithelial, UA NOT REPORTED 0 /HPF    Bacteria, UA None None    Mucus, UA NOT REPORTED None    Trichomonas, UA NOT REPORTED None    Amorphous, UA NOT REPORTED None    Other Observations UA NOT REPORTED NOT REQ.     Yeast, UA NOT REPORTED None         EMERGENCY DEPARTMENT COURSE:   Recent Vitals:    Vitals:    09/14/20 1828 09/14/20 2004   BP: (!) 178/83 129/75   Pulse: 92 66   Resp: 14 14   Temp: 97.9 °F (36.6 °C)    TempSrc: Tympanic    SpO2: 95% 95%   Weight: 200 lb (90.7 kg)    Height: 6' (1.829 m)      -------------------------  BP: 129/75, Temp: 97.9 °F (36.6 °C), Pulse: 66, Resp: 14      The patient was given the following medications:  Orders Placed This Encounter   Medications    DISCONTD: lidocaine (XYLOCAINE) 2 % uro-jet           MEDICAL DECISION MAKING:     Patient presents with urinary retention after hernia surgery today. Patel catheter placed. Urinalysis shows no evidence of infection. Plan to be discharged with the Patel catheter in place. Advised to follow-up with either urology or his PCP to have it removed and rechecked in the next 1 to 2 days. Advised to return sooner if worse or for new or concerning symptoms. He is comfortable with the plan. The patient understands that at this time there is no evidence for a more malignant underlying process, but the patient also understands that early in the process of an illness or injury, an emergency department workup can be falsely reassuring. Routine discharge counseling was given, and the patient understands that worsening, changing or persistent symptoms should prompt an immediate call or follow up with their primary physician or return to the emergency department. The importance of appropriate follow up was also discussed. I have reviewed the disposition diagnosis with the patient and or their family/guardian. I have answered their questions and given discharge instructions. They voiced understanding of these instructions and did not have any further questions or complaints. CONSULTS:    None    CRITICAL CARE:     None    PROCEDURES:    None    FINAL IMPRESSION      1.  Urinary retention          DISPOSITION/PLAN   DISPOSITION Decision To Discharge 09/14/2020 08:03:51 PM      Condition on Disposition    Improved    PATIENT REFERRED TO:  Adan Ashraf MD  [address] [facility] [place]  [phone]    Schedule an appointment as soon as possible for a visit in 2 days      Tomás Carrera MD  [address] [facility] [address] [facility] [place]  [phone]    Schedule an appointment

## 2020-09-16 ENCOUNTER — OFFICE VISIT (OUTPATIENT)
Dept: UROLOGY | Age: 65
End: 2020-09-16
Payer: MEDICARE

## 2020-09-16 VITALS
SYSTOLIC BLOOD PRESSURE: 114 MMHG | RESPIRATION RATE: 12 BRPM | OXYGEN SATURATION: 95 % | DIASTOLIC BLOOD PRESSURE: 70 MMHG | HEIGHT: 72 IN | HEART RATE: 67 BPM | BODY MASS INDEX: 26.95 KG/M2 | WEIGHT: 199 LBS

## 2020-09-16 LAB — SURGICAL PATHOLOGY REPORT: NORMAL

## 2020-09-16 PROCEDURE — 99214 OFFICE O/P EST MOD 30 MIN: CPT

## 2020-09-16 PROCEDURE — 99214 OFFICE O/P EST MOD 30 MIN: CPT | Performed by: UROLOGY

## 2020-09-16 RX ORDER — POLYETHYLENE GLYCOL 3350 17 G/17G
17 POWDER, FOR SOLUTION ORAL DAILY
Qty: 1530 G | Refills: 1 | Status: SHIPPED | OUTPATIENT
Start: 2020-09-16 | End: 2020-10-16

## 2020-09-16 SDOH — HEALTH STABILITY: MENTAL HEALTH: HOW MANY STANDARD DRINKS CONTAINING ALCOHOL DO YOU HAVE ON A TYPICAL DAY?: 3 OR 4

## 2020-09-16 SDOH — HEALTH STABILITY: MENTAL HEALTH: HOW OFTEN DO YOU HAVE A DRINK CONTAINING ALCOHOL?: MONTHLY OR LESS

## 2020-09-16 NOTE — PROGRESS NOTES
Juanjo Koch MD   Urology Clinic follow up      Patient:  Kinsey Keyes  YOB: 1955  Date: 9/16/2020    HISTORY OF PRESENT ILLNESS:   The patient is a 76 y.o. male who presents today for evaluation of the following problem(s): BPH, LUTS  Overall the problem(s) : worsening  Associated Symptoms: No dysuria, gross hematuria. Pain Severity:      Summary of old records: prev seen ft [facility] urologist. Was in ER recently 6/9/2018 and catheter placed and then removed. on flomax  No hx of stroke, PD, SCI, DM  No  surgeries  (Patient's old records, notes and chart reviewed and summarized above.)      LUTS on Flomax, stable. Noc, 0-1  ED: taking 5mg cialis PRN doing well working well for him    Today 9/16/2020  Post op urinary retention, 2 days ago  On flomax still from baseline  Relieved with walton  Worsening by constipation. No UTIs or hematuria. Location bladder      Last several PSA's:  Lab Results   Component Value Date    PSA 2.3 03/17/2020    PSA 2.99 03/17/2020    PSA 3.15 01/17/2020       Last total testosterone:  No results found for: TESTOSTERONE    Urinalysis today:  No results found for this visit on 09/16/20.       Last BUN and creatinine:  Lab Results   Component Value Date    BUN 20 08/24/2020     Lab Results   Component Value Date    CREATININE 1.13 08/24/2020       Imaging Reviewed during this Office Visit:   (results were independently reviewed by physician and radiology report verified)    PAST MEDICAL, FAMILY AND SOCIAL HISTORY:  Past Medical History:   Diagnosis Date    BPH (benign prostatic hypertrophy)     with outlet symptoms    Carpal tunnel syndrome, bilateral     Cervical disc disorder     Footdrop     Left    Herpes simplex labialis     episodic    Hyperlipidemia     Hypothyroidism     Mumps     Neck pain     Neuropathy     Paresthesias     Peripheral neuropathy     Pneumonia     history of    Ulnar neuropathy of right upper extremity      Past Surgical History:   Procedure Laterality Date    CARPAL TUNNEL RELEASE Right feb 2014    also ulnar nerve r elbow     COLONOSCOPY  2009    INGUINAL HERNIA REPAIR Left 9/14/2020    Laparoscopic Robotic Assisted Left Inguinal Hernia Repair performed by Irene Villa DO at [place] [address].      peridontal    OTHER SURGICAL HISTORY      IVIG INFUSIONS STARTING NOVEMBER OF 2015 AND EVERY 3 MONTH SINCE    TOE SURGERY       Family History   Problem Relation Age of Onset    Heart Disease Mother     Cancer Mother         lung    Cancer Father         lung    Glaucoma Neg Hx      Outpatient Medications Marked as Taking for the 9/16/20 encounter (Office Visit) with Dalila Villalba MD   Medication Sig Dispense Refill    polyethylene glycol (GLYCOLAX) 17 GM/SCOOP powder Take 17 g by mouth daily 1530 g 1    HYDROcodone-acetaminophen (NORCO) 5-325 MG per tablet Take 1 tablet by mouth every 6 hours as needed for Pain for up to 7 days. Intended supply: 7 days. Take lowest dose possible to manage pain 28 tablet 0    docusate sodium (COLACE) 100 MG capsule Take 1 capsule by mouth 2 times daily as needed for Constipation (use while taking narcotic pain meds) 30 capsule 0    cyclobenzaprine (FLEXERIL) 5 MG tablet Take 1 tablet by mouth 2 times daily as needed for Muscle spasms 10 tablet 0    valACYclovir (VALTREX) 1 g tablet Take 1 tablet by mouth 2 times daily 14 tablet 12    tadalafil (CIALIS) 5 MG tablet TAKE 1 TABLET DAILY as needed 16 tablet 3    tamsulosin (FLOMAX) 0.4 MG capsule TAKE 1 CAPSULE ONCE A DAY 90 capsule 3    b complex vitamins capsule Take 1 capsule by mouth daily.  Ascorbic Acid (VITAMIN C) 250 MG tablet Take 500 mg by mouth daily.  Omega-3 Fatty Acids (FISH OIL) 1000 MG CAPS Take 1,000 mg by mouth daily.  Vitamin D (CHOLECALCIFEROL) 1000 UNITS CAPS capsule Take 1,000 Units by mouth daily.  vitamin B-12 (CYANOCOBALAMIN) 1000 MCG tablet Take 1,000 mcg by mouth daily.          Patient

## 2020-09-23 ENCOUNTER — OFFICE VISIT (OUTPATIENT)
Dept: UROLOGY | Age: 65
End: 2020-09-23
Payer: MEDICARE

## 2020-09-23 VITALS
HEART RATE: 76 BPM | HEIGHT: 72 IN | SYSTOLIC BLOOD PRESSURE: 120 MMHG | DIASTOLIC BLOOD PRESSURE: 62 MMHG | WEIGHT: 199 LBS | BODY MASS INDEX: 26.95 KG/M2

## 2020-09-23 PROCEDURE — 99214 OFFICE O/P EST MOD 30 MIN: CPT

## 2020-09-23 PROCEDURE — 99213 OFFICE O/P EST LOW 20 MIN: CPT | Performed by: UROLOGY

## 2020-09-23 NOTE — PROGRESS NOTES
neuropathy     Pneumonia     history of    Ulnar neuropathy of right upper extremity      Past Surgical History:   Procedure Laterality Date    CARPAL TUNNEL RELEASE Right feb 2014    also ulnar nerve r elbow     COLONOSCOPY  2009    INGUINAL HERNIA REPAIR Left 9/14/2020    Laparoscopic Robotic Assisted Left Inguinal Hernia Repair performed by Belle Tucker DO at . [address]      peridontal    OTHER SURGICAL HISTORY      IVIG INFUSIONS STARTING NOVEMBER OF 2015 AND EVERY 3 MONTH SINCE    TOE SURGERY       Family History   Problem Relation Age of Onset    Heart Disease Mother     Cancer Mother         lung    Cancer Father         lung    Glaucoma Neg Hx      Outpatient Medications Marked as Taking for the 9/23/20 encounter (Office Visit) with John Cole MD   Medication Sig Dispense Refill    polyethylene glycol (GLYCOLAX) 17 GM/SCOOP powder Take 17 g by mouth daily 1530 g 1    docusate sodium (COLACE) 100 MG capsule Take 1 capsule by mouth 2 times daily as needed for Constipation (use while taking narcotic pain meds) 30 capsule 0    cyclobenzaprine (FLEXERIL) 5 MG tablet Take 1 tablet by mouth 2 times daily as needed for Muscle spasms 10 tablet 0    valACYclovir (VALTREX) 1 g tablet Take 1 tablet by mouth 2 times daily 14 tablet 12    tadalafil (CIALIS) 5 MG tablet TAKE 1 TABLET DAILY as needed 16 tablet 3    tamsulosin (FLOMAX) 0.4 MG capsule TAKE 1 CAPSULE ONCE A DAY 90 capsule 3    b complex vitamins capsule Take 1 capsule by mouth daily.  Ascorbic Acid (VITAMIN C) 250 MG tablet Take 500 mg by mouth daily.  Omega-3 Fatty Acids (FISH OIL) 1000 MG CAPS Take 1,000 mg by mouth daily.  Vitamin D (CHOLECALCIFEROL) 1000 UNITS CAPS capsule Take 1,000 Units by mouth daily.  vitamin B-12 (CYANOCOBALAMIN) 1000 MCG tablet Take 1,000 mcg by mouth daily. Patient has no known allergies.   Social History     Tobacco Use   Smoking Status Former Smoker    Packs/day: 1.00    Years: 25.00    Pack years: 25.00    Types: Cigarettes    Last attempt to quit: 1/1/2005    Years since quitting: 15.7   Smokeless Tobacco Never Used   Tobacco Comment    smoked for 25 years, stopped 2005. 1 pack daily       Social History     Substance and Sexual Activity   Alcohol Use Yes    Alcohol/week: 7.0 standard drinks    Types: 7 Cans of beer per week    Frequency: Monthly or less    Drinks per session: 3 or 4    Binge frequency: Never    Comment: 1 daily       REVIEW OF SYSTEMS:  Constitutional: negative  Eyes: negative  Respiratory: negative  Cardiovascular: negative  Gastrointestinal: negative  Musculoskeletal: negative  Genitourinary: negative except for what is in HPI  Skin: negative   Neurological: negative  Hematological/Lymphatic: negative  Psychological: negative    Physical Exam:    This a 76 y.o. male   Vitals:    09/23/20 0950   BP: 120/62   Pulse: 76     Constitutional: Patient in no acute distress; Neuro: alert and oriented to person place and time. Psych: Mood and affect normal.  Skin: Normal  Walton in place      Assessment and Plan      1. Incomplete bladder emptying    2. Erectile dysfunction due to arterial insufficiency    3. Urinary frequency    4. BPH with obstruction/lower urinary tract symptoms    5. Elevated PSA    6. Lower urinary tract symptoms (LUTS)    7. Urinary retention           Plan:         BPH: continue flomax, PSA 3/2021  ED- on cialis PRN. Urinary retention: post op  Constipation: continue laxatives. Continue MiraLAX. Removed walton today  Follow up next year with PSA, per patient preference  Discussed warning signs and when to come back to ER/Clinic        Return in about 1 year (around 9/23/2021).            Darell Mcconnell MD  [facility]

## 2020-09-30 ENCOUNTER — OFFICE VISIT (OUTPATIENT)
Dept: SURGERY | Age: 65
End: 2020-09-30
Payer: MEDICARE

## 2020-09-30 VITALS
HEART RATE: 89 BPM | BODY MASS INDEX: 26.95 KG/M2 | DIASTOLIC BLOOD PRESSURE: 80 MMHG | RESPIRATION RATE: 16 BRPM | HEIGHT: 72 IN | OXYGEN SATURATION: 95 % | SYSTOLIC BLOOD PRESSURE: 128 MMHG | TEMPERATURE: 97.2 F | WEIGHT: 199 LBS

## 2020-09-30 PROCEDURE — 99024 POSTOP FOLLOW-UP VISIT: CPT | Performed by: SURGERY

## 2020-09-30 PROCEDURE — 99213 OFFICE O/P EST LOW 20 MIN: CPT

## 2020-09-30 NOTE — PROGRESS NOTES
[facility]  Progress Note    PATIENT NAME: Lori Casillas     CLINIC VISIT DATE: 9/30/2020    SUBJECTIVE:  Lori Casillas is a 76 y.o. male who presents to the clinic today for follow up to robot left inguinal hernia, he is doing well. Tolerating regular diet, normal BM, no pain. OBJECTIVE:    /80   Pulse 89   Temp 97.2 °F (36.2 °C) (Tympanic)   Resp 16   Ht 6' (1.829 m)   Wt 199 lb (90.3 kg)   SpO2 95%   BMI 26.99 kg/m²     General Appearance:  awake, alert, no acute distress, well developed, well nourished   Skin:  Skin color, texture, turgor normal. No rashes or lesions. Lungs:  Normal chest expansion, unlabored breathing without accessory muscle use. No audible rales, rhonchi, or wheezing. Cardiovascular: S1S2. No evidence of JVD. No evidence of pulsatile masses in abdomen  Abdomen:  Soft, non-tender, no organomegaly, no masses. Incisions well healed without evidence of bleeding/infection  Musculoskeletal: No evidence of bony/muscular deformities, trauma, atrophy of either left/right upper/lower extremity. No evidence of digital clubbing or cyanosis. ASSESSMENT:   Diagnosis Orders   1. Status post inguinal hernia repair         PLAN:  1. Lifting restriction to less than 20lbs for the next 4 weeks, unrestricted after this. 2. Diet as tolerated  3. We discussed the possibility of RIH repair in the future, pt will return to clinic if this becomes symptomatic, otherwise can be safely observed.     Electronically signed by Ginger Obrien DO on 9/30/2020 at 2:35 PM

## 2020-09-30 NOTE — LETTER
[address]  Phone: [phone]  Fax: [phone]      20    Patient: Raymond More  MRN: [id number]  : 1955  Date of visit: 2020    Dear Dr Nely Hamilton: I saw Raymond More in follow up to his left inguinal hernia repair, he is doing well. Below are the relevant portions of my assessment and plan of care. If you have questions, please do not hesitate to call me. I look forward to following Raymond More along with you.     Sincerely,        Leigh Bills, DO

## 2021-02-19 ENCOUNTER — IMMUNIZATION (OUTPATIENT)
Dept: FAMILY MEDICINE CLINIC | Age: 66
End: 2021-02-19
Payer: MEDICARE

## 2021-02-19 PROCEDURE — 91300 COVID-19, PFIZER VACCINE 30MCG/0.3ML DOSE: CPT | Performed by: INTERNAL MEDICINE

## 2021-02-19 PROCEDURE — 0001A COVID-19, PFIZER VACCINE 30MCG/0.3ML DOSE: CPT | Performed by: INTERNAL MEDICINE

## 2021-03-05 DIAGNOSIS — R39.9 LOWER URINARY TRACT SYMPTOMS (LUTS): ICD-10-CM

## 2021-03-05 DIAGNOSIS — R33.9 INCOMPLETE BLADDER EMPTYING: Primary | ICD-10-CM

## 2021-03-05 NOTE — TELEPHONE ENCOUNTER
Patient would like tamsulosin (FLOMAX) 0.4 MG capsule   Refilled to [address] [place] [address] in [place].   Patient would like to be called when this is renewed  [phone]

## 2021-03-08 RX ORDER — TAMSULOSIN HYDROCHLORIDE 0.4 MG/1
0.4 CAPSULE ORAL DAILY
Qty: 90 CAPSULE | Refills: 3 | Status: SHIPPED | OUTPATIENT
Start: 2021-03-08 | End: 2021-03-10 | Stop reason: SDUPTHER

## 2021-03-09 RX ORDER — TAMSULOSIN HYDROCHLORIDE 0.4 MG/1
CAPSULE ORAL
Qty: 90 CAPSULE | Refills: 3 | Status: SHIPPED | OUTPATIENT
Start: 2021-03-09 | End: 2022-03-09 | Stop reason: SDUPTHER

## 2021-03-10 ENCOUNTER — OFFICE VISIT (OUTPATIENT)
Dept: FAMILY MEDICINE CLINIC | Age: 66
End: 2021-03-10
Payer: MEDICARE

## 2021-03-10 VITALS
TEMPERATURE: 97.9 F | BODY MASS INDEX: 27.53 KG/M2 | DIASTOLIC BLOOD PRESSURE: 76 MMHG | OXYGEN SATURATION: 96 % | WEIGHT: 203 LBS | SYSTOLIC BLOOD PRESSURE: 118 MMHG | HEART RATE: 68 BPM

## 2021-03-10 DIAGNOSIS — H02.846 EDEMA OF LEFT EYELID: ICD-10-CM

## 2021-03-10 DIAGNOSIS — S05.02XD ABRASION OF LEFT CORNEA, SUBSEQUENT ENCOUNTER: Primary | ICD-10-CM

## 2021-03-10 PROCEDURE — 99211 OFF/OP EST MAY X REQ PHY/QHP: CPT

## 2021-03-10 PROCEDURE — 99212 OFFICE O/P EST SF 10 MIN: CPT | Performed by: NURSE PRACTITIONER

## 2021-03-10 RX ORDER — OFLOXACIN 3 MG/ML
3 SOLUTION/ DROPS OPHTHALMIC 4 TIMES DAILY
COMMUNITY
End: 2021-08-25

## 2021-03-10 ASSESSMENT — PATIENT HEALTH QUESTIONNAIRE - PHQ9
1. LITTLE INTEREST OR PLEASURE IN DOING THINGS: 0
2. FEELING DOWN, DEPRESSED OR HOPELESS: 0
SUM OF ALL RESPONSES TO PHQ QUESTIONS 1-9: 0
SUM OF ALL RESPONSES TO PHQ QUESTIONS 1-9: 0
SUM OF ALL RESPONSES TO PHQ9 QUESTIONS 1 & 2: 0

## 2021-03-10 ASSESSMENT — ENCOUNTER SYMPTOMS
VOMITING: 0
EYE DISCHARGE: 1
EYE REDNESS: 1
PHOTOPHOBIA: 1
FOREIGN BODY SENSATION: 1
NAUSEA: 0
FACIAL SWELLING: 1

## 2021-03-10 NOTE — PROGRESS NOTES
[facility] [place] [address] In [address] [facility], APRN-CNP  [address]  Phone:  [phone]  Fax:  [phone]  Vee Harris is a 76 y.o. male who presents today for his medical conditions/complaints as noted below. Vee Harris c/o of Eye Problem (red swollen left eye-got saw dust in it yesterday-went to [facility] ER yesterday was given drops-not helping)      HPI:     Eye Problem   The left eye is affected. This is a new problem. The current episode started yesterday. The problem has been gradually worsening. Injury mechanism: cutting wood and debris went into the eye. The pain is at a severity of 6/10. Associated symptoms include an eye discharge (clear), eye redness, a foreign body sensation and photophobia. Pertinent negatives include no fever, nausea or vomiting. Treatments tried: floxacin eye drops from the Emergency room. The treatment provided mild relief.        Wt Readings from Last 3 Encounters:   03/10/21 203 lb (92.1 kg)   09/30/20 199 lb (90.3 kg)   09/23/20 199 lb (90.3 kg)       Temp Readings from Last 3 Encounters:   03/10/21 97.9 °F (36.6 °C) (Tympanic)   09/30/20 97.2 °F (36.2 °C) (Tympanic)   09/14/20 97.9 °F (36.6 °C) (Tympanic)       BP Readings from Last 3 Encounters:   03/10/21 118/76   09/30/20 128/80   09/23/20 120/62       Pulse Readings from Last 3 Encounters:   03/10/21 68   09/30/20 89   09/23/20 76              Past Medical History:   Diagnosis Date    BPH (benign prostatic hypertrophy)     with outlet symptoms    Carpal tunnel syndrome, bilateral     Cervical disc disorder     Footdrop     Left    Herpes simplex labialis     episodic    Hyperlipidemia     Hypothyroidism     Mumps     Neck pain     Neuropathy     Paresthesias     Peripheral neuropathy     Pneumonia     history of    Ulnar neuropathy of right upper extremity       Past Surgical History:   Procedure Laterality Date    CARPAL TUNNEL RELEASE Right feb 2014    also ulnar nerve r elbow     COLONOSCOPY      INGUINAL HERNIA REPAIR Left 2020    Laparoscopic Robotic Assisted Left Inguinal Hernia Repair performed by Geena Weaver DO at [address]      peridontal    OTHER SURGICAL HISTORY      IVIG INFUSIONS STARTING 2015 AND EVERY 3 MONTH SINCE    TOE SURGERY       Family History   Problem Relation Age of Onset    Heart Disease Mother     Cancer Mother         lung    Cancer Father         lung    Glaucoma Neg Hx      Social History     Tobacco Use    Smoking status: Former Smoker     Packs/day: 1.00     Years: 25.00     Pack years: 25.00     Types: Cigarettes     Quit date: 2005     Years since quittin.1    Smokeless tobacco: Never Used    Tobacco comment: smoked for 25 years, stopped 2005. 1 pack daily   Substance Use Topics    Alcohol use: Yes     Alcohol/week: 7.0 standard drinks     Types: 7 Cans of beer per week     Frequency: Monthly or less     Drinks per session: 3 or 4     Binge frequency: Never     Comment: 1 daily      Current Outpatient Medications   Medication Sig Dispense Refill    ofloxacin (OCUFLOX) 0.3 % solution 3 drops 4 times daily      tamsulosin (FLOMAX) 0.4 MG capsule TALE 1 CAPSULE ONCE A DAY 90 capsule 3    docusate sodium (COLACE) 100 MG capsule Take 1 capsule by mouth 2 times daily as needed for Constipation (use while taking narcotic pain meds) 30 capsule 0    valACYclovir (VALTREX) 1 g tablet Take 1 tablet by mouth 2 times daily 14 tablet 12    tadalafil (CIALIS) 5 MG tablet TAKE 1 TABLET DAILY as needed 16 tablet 3    b complex vitamins capsule Take 1 capsule by mouth daily.  Ascorbic Acid (VITAMIN C) 250 MG tablet Take 500 mg by mouth daily.  Omega-3 Fatty Acids (FISH OIL) 1000 MG CAPS Take 1,000 mg by mouth daily.  Vitamin D (CHOLECALCIFEROL) 1000 UNITS CAPS capsule Take 1,000 Units by mouth daily.  vitamin B-12 (CYANOCOBALAMIN) 1000 MCG tablet Take 1,000 mcg by mouth daily.

## 2021-03-11 ENCOUNTER — IMMUNIZATION (OUTPATIENT)
Dept: FAMILY MEDICINE CLINIC | Age: 66
End: 2021-03-11
Payer: MEDICARE

## 2021-03-11 PROCEDURE — 0002A COVID-19, PFIZER VACCINE 30MCG/0.3ML DOSE: CPT | Performed by: INTERNAL MEDICINE

## 2021-03-11 PROCEDURE — 91300 COVID-19, PFIZER VACCINE 30MCG/0.3ML DOSE: CPT | Performed by: INTERNAL MEDICINE

## 2021-04-21 ENCOUNTER — OFFICE VISIT (OUTPATIENT)
Dept: UROLOGY | Age: 66
End: 2021-04-21
Payer: MEDICARE

## 2021-04-21 VITALS — HEART RATE: 66 BPM | SYSTOLIC BLOOD PRESSURE: 110 MMHG | DIASTOLIC BLOOD PRESSURE: 60 MMHG

## 2021-04-21 DIAGNOSIS — R97.20 ELEVATED PSA: ICD-10-CM

## 2021-04-21 DIAGNOSIS — N52.01 ERECTILE DYSFUNCTION DUE TO ARTERIAL INSUFFICIENCY: ICD-10-CM

## 2021-04-21 DIAGNOSIS — N13.8 BPH WITH OBSTRUCTION/LOWER URINARY TRACT SYMPTOMS: ICD-10-CM

## 2021-04-21 DIAGNOSIS — R35.0 URINARY FREQUENCY: ICD-10-CM

## 2021-04-21 DIAGNOSIS — N40.1 BPH WITH OBSTRUCTION/LOWER URINARY TRACT SYMPTOMS: ICD-10-CM

## 2021-04-21 DIAGNOSIS — R39.9 LOWER URINARY TRACT SYMPTOMS (LUTS): ICD-10-CM

## 2021-04-21 DIAGNOSIS — R33.9 URINARY RETENTION: ICD-10-CM

## 2021-04-21 DIAGNOSIS — R33.9 INCOMPLETE BLADDER EMPTYING: Primary | ICD-10-CM

## 2021-04-21 PROCEDURE — 99214 OFFICE O/P EST MOD 30 MIN: CPT | Performed by: UROLOGY

## 2021-04-21 NOTE — PROGRESS NOTES
Mireille Sierra MD   Urology Clinic follow up      Patient:  Ben Reyes  YOB: 1955  Date: 4/21/2021    HISTORY OF PRESENT ILLNESS:   The patient is a 76 y.o. male who presents today for evaluation of the following problem(s): BPH, LUTS, urinary retention  Overall the problem(s) : No change  Associated Symptoms: No dysuria, gross hematuria. Pain Severity:      Summary of old records: prev seen ft [facility] urologist. Was in ER recently 6/9/2018 and catheter placed and then removed. on flomax  No hx of stroke, PD, SCI, DM  No  surgeries  (Patient's old records, notes and chart reviewed and summarized above.)    ED: taking 5mg cialis PRN doing well working well for him    September 23, 2020 Post op urinary retention    Last week, went out drinking, had a lot to drink and went into retention and went to ER and had walton placed, unknown exact volume but was [place] lot\"  Still on flomax. Still cialis 5mg PRN  No UTIs or hematuria. Denies constipation      Last several PSA's:  Lab Results   Component Value Date    PSA 2.3 03/17/2020    PSA 2.99 03/17/2020    PSA 3.15 01/17/2020       Last total testosterone:  No results found for: TESTOSTERONE    Urinalysis today:  No results found for this visit on 04/21/21.       Last BUN and creatinine:  Lab Results   Component Value Date    BUN 20 08/24/2020     Lab Results   Component Value Date    CREATININE 1.13 08/24/2020       Imaging Reviewed during this Office Visit:   (results were independently reviewed by physician and radiology report verified)    PAST MEDICAL, FAMILY AND SOCIAL HISTORY:  Past Medical History:   Diagnosis Date    BPH (benign prostatic hypertrophy)     with outlet symptoms    Carpal tunnel syndrome, bilateral     Cervical disc disorder     Footdrop     Left    Herpes simplex labialis     episodic    Hyperlipidemia     Hypothyroidism     Mumps     Neck pain     Neuropathy     Paresthesias     Peripheral neuropathy     Pneumonia     history of    Ulnar neuropathy of right upper extremity      Past Surgical History:   Procedure Laterality Date    CARPAL TUNNEL RELEASE Right 2014    also ulnar nerve r elbow     COLONOSCOPY  2009    INGUINAL HERNIA REPAIR Left 2020    Laparoscopic Robotic Assisted Left Inguinal Hernia Repair performed by Sharon Resendiz DO at [address]      peridontal    OTHER SURGICAL HISTORY      IVIG INFUSIONS STARTING 2015 AND EVERY 3 MONTH SINCE    TOE SURGERY       Family History   Problem Relation Age of Onset    Heart Disease Mother     Cancer Mother         lung    Cancer Father         lung    Glaucoma Neg Hx      Outpatient Medications Marked as Taking for the 21 encounter (Office Visit) with Ruma Childers MD   Medication Sig Dispense Refill    ofloxacin (OCUFLOX) 0.3 % solution 3 drops 4 times daily      tamsulosin (FLOMAX) 0.4 MG capsule TALE 1 CAPSULE ONCE A DAY 90 capsule 3    docusate sodium (COLACE) 100 MG capsule Take 1 capsule by mouth 2 times daily as needed for Constipation (use while taking narcotic pain meds) 30 capsule 0    valACYclovir (VALTREX) 1 g tablet Take 1 tablet by mouth 2 times daily 14 tablet 12    tadalafil (CIALIS) 5 MG tablet TAKE 1 TABLET DAILY as needed 16 tablet 3    b complex vitamins capsule Take 1 capsule by mouth daily.  Ascorbic Acid (VITAMIN C) 250 MG tablet Take 500 mg by mouth daily.  Omega-3 Fatty Acids (FISH OIL) 1000 MG CAPS Take 1,000 mg by mouth daily.  Vitamin D (CHOLECALCIFEROL) 1000 UNITS CAPS capsule Take 1,000 Units by mouth daily.  vitamin B-12 (CYANOCOBALAMIN) 1000 MCG tablet Take 1,000 mcg by mouth daily. Patient has no known allergies.   Social History     Tobacco Use   Smoking Status Former Smoker    Packs/day: 1.00    Years: 25.00    Pack years: 25.00    Types: Cigarettes    Quit date: 2005    Years since quittin.3   Smokeless Tobacco Never Used   Tobacco Comment    smoked for 25 years, stopped 2005. 1 pack daily       Social History     Substance and Sexual Activity   Alcohol Use Yes    Alcohol/week: 7.0 standard drinks    Types: 7 Cans of beer per week    Frequency: Monthly or less    Drinks per session: 3 or 4    Binge frequency: Never    Comment: 1 daily       REVIEW OF SYSTEMS:  Constitutional: negative  Eyes: negative  Respiratory: negative  Cardiovascular: negative  Gastrointestinal: negative  Musculoskeletal: negative  Genitourinary: negative except for what is in HPI  Skin: negative   Neurological: negative  Hematological/Lymphatic: negative  Psychological: negative    Physical Exam:    This a 76 y.o. male   Vitals:    04/21/21 0951   BP: 110/60   Pulse: 66     Constitutional: Patient in no acute distress; Neuro: alert and oriented to person place and time. Psych: Mood and affect normal.  Skin: Normal  Walton in place      Assessment and Plan      1. Incomplete bladder emptying    2. Lower urinary tract symptoms (LUTS)    3. Urinary frequency    4. Erectile dysfunction due to arterial insufficiency    5. BPH with obstruction/lower urinary tract symptoms    6. Elevated PSA    7. Urinary retention           Plan:         BPH: continue flomax, check PSA 1 month  ED- on cialis PRN. Urinary retention: remove walton today, follow up 1 month with PVR or sooner with issues. Constipation: continue laxatives. Continue MiraLAX.     Discussed warning signs and when to come back to ER/Clinic  Remove walton  Follow up 4-6 week with PSA and PVR               Gentry Curling, MD  [facility]

## 2021-05-26 ENCOUNTER — OFFICE VISIT (OUTPATIENT)
Dept: FAMILY MEDICINE CLINIC | Age: 66
End: 2021-05-26
Payer: MEDICARE

## 2021-05-26 VITALS
BODY MASS INDEX: 27.4 KG/M2 | DIASTOLIC BLOOD PRESSURE: 76 MMHG | OXYGEN SATURATION: 96 % | WEIGHT: 202 LBS | SYSTOLIC BLOOD PRESSURE: 118 MMHG | HEART RATE: 58 BPM

## 2021-05-26 DIAGNOSIS — E78.5 HYPERLIPIDEMIA, UNSPECIFIED HYPERLIPIDEMIA TYPE: ICD-10-CM

## 2021-05-26 DIAGNOSIS — R33.9 URINARY RETENTION: ICD-10-CM

## 2021-05-26 DIAGNOSIS — G61.81 CIDP (CHRONIC INFLAMMATORY DEMYELINATING POLYNEUROPATHY) (HCC): Primary | ICD-10-CM

## 2021-05-26 DIAGNOSIS — N52.9 ERECTILE DYSFUNCTION, UNSPECIFIED ERECTILE DYSFUNCTION TYPE: ICD-10-CM

## 2021-05-26 DIAGNOSIS — B00.1 HERPES SIMPLEX LABIALIS: ICD-10-CM

## 2021-05-26 DIAGNOSIS — N40.1 BENIGN PROSTATIC HYPERPLASIA WITH LOWER URINARY TRACT SYMPTOMS, SYMPTOM DETAILS UNSPECIFIED: ICD-10-CM

## 2021-05-26 DIAGNOSIS — N40.1 BENIGN LOCALIZED PROSTATIC HYPERPLASIA WITH LOWER URINARY TRACT SYMPTOMS (LUTS): ICD-10-CM

## 2021-05-26 LAB — DIAGNOSTIC PSA: 3.78 NG/ML (ref 0–4)

## 2021-05-26 PROCEDURE — 99213 OFFICE O/P EST LOW 20 MIN: CPT

## 2021-05-26 PROCEDURE — 99214 OFFICE O/P EST MOD 30 MIN: CPT | Performed by: INTERNAL MEDICINE

## 2021-05-26 SDOH — ECONOMIC STABILITY: FOOD INSECURITY: WITHIN THE PAST 12 MONTHS, THE FOOD YOU BOUGHT JUST DIDN'T LAST AND YOU DIDN'T HAVE MONEY TO GET MORE.: NEVER TRUE

## 2021-05-26 SDOH — ECONOMIC STABILITY: FOOD INSECURITY: WITHIN THE PAST 12 MONTHS, YOU WORRIED THAT YOUR FOOD WOULD RUN OUT BEFORE YOU GOT MONEY TO BUY MORE.: NEVER TRUE

## 2021-05-26 ASSESSMENT — SOCIAL DETERMINANTS OF HEALTH (SDOH): HOW HARD IS IT FOR YOU TO PAY FOR THE VERY BASICS LIKE FOOD, HOUSING, MEDICAL CARE, AND HEATING?: NOT HARD AT ALL
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
## 2021-05-27 ENCOUNTER — TELEPHONE (OUTPATIENT)
Dept: FAMILY MEDICINE CLINIC | Age: 66
End: 2021-05-27

## 2021-05-27 DIAGNOSIS — R33.9 URINARY RETENTION: ICD-10-CM

## 2021-05-27 DIAGNOSIS — G61.81 CIDP (CHRONIC INFLAMMATORY DEMYELINATING POLYNEUROPATHY) (HCC): Primary | ICD-10-CM

## 2021-05-27 DIAGNOSIS — B00.1 HERPES SIMPLEX LABIALIS: ICD-10-CM

## 2021-05-27 DIAGNOSIS — H02.846 EDEMA OF LEFT EYELID: ICD-10-CM

## 2021-05-27 DIAGNOSIS — N52.9 ERECTILE DYSFUNCTION, UNSPECIFIED ERECTILE DYSFUNCTION TYPE: ICD-10-CM

## 2021-05-27 DIAGNOSIS — Z00.00 ROUTINE GENERAL MEDICAL EXAMINATION AT A HEALTH CARE FACILITY: ICD-10-CM

## 2021-05-27 DIAGNOSIS — S05.02XD ABRASION OF LEFT CORNEA, SUBSEQUENT ENCOUNTER: ICD-10-CM

## 2021-05-27 DIAGNOSIS — N40.1 BENIGN LOCALIZED PROSTATIC HYPERPLASIA WITH LOWER URINARY TRACT SYMPTOMS (LUTS): ICD-10-CM

## 2021-05-27 DIAGNOSIS — N40.1 BENIGN PROSTATIC HYPERPLASIA WITH LOWER URINARY TRACT SYMPTOMS, SYMPTOM DETAILS UNSPECIFIED: ICD-10-CM

## 2021-05-27 DIAGNOSIS — E78.5 HYPERLIPIDEMIA, UNSPECIFIED HYPERLIPIDEMIA TYPE: ICD-10-CM

## 2021-06-01 ASSESSMENT — ENCOUNTER SYMPTOMS
BLOOD IN STOOL: 0
SHORTNESS OF BREATH: 0
SORE THROAT: 0
TROUBLE SWALLOWING: 0
SINUS PAIN: 0
COUGH: 0
CHEST TIGHTNESS: 0
ABDOMINAL PAIN: 0
RHINORRHEA: 0
DIARRHEA: 0

## 2021-06-02 ENCOUNTER — OFFICE VISIT (OUTPATIENT)
Dept: UROLOGY | Age: 66
End: 2021-06-02
Payer: MEDICARE

## 2021-06-02 VITALS
BODY MASS INDEX: 26.41 KG/M2 | WEIGHT: 195 LBS | HEART RATE: 70 BPM | HEIGHT: 72 IN | TEMPERATURE: 97.5 F | RESPIRATION RATE: 16 BRPM | SYSTOLIC BLOOD PRESSURE: 130 MMHG | DIASTOLIC BLOOD PRESSURE: 72 MMHG | OXYGEN SATURATION: 97 %

## 2021-06-02 DIAGNOSIS — N13.8 BPH WITH OBSTRUCTION/LOWER URINARY TRACT SYMPTOMS: ICD-10-CM

## 2021-06-02 DIAGNOSIS — N52.01 ERECTILE DYSFUNCTION DUE TO ARTERIAL INSUFFICIENCY: ICD-10-CM

## 2021-06-02 DIAGNOSIS — R33.9 URINARY RETENTION: Primary | ICD-10-CM

## 2021-06-02 DIAGNOSIS — R35.0 URINARY FREQUENCY: ICD-10-CM

## 2021-06-02 DIAGNOSIS — R97.20 ELEVATED PSA: ICD-10-CM

## 2021-06-02 DIAGNOSIS — N40.1 BPH WITH OBSTRUCTION/LOWER URINARY TRACT SYMPTOMS: ICD-10-CM

## 2021-06-02 PROCEDURE — 99214 OFFICE O/P EST MOD 30 MIN: CPT | Performed by: UROLOGY

## 2021-06-02 PROCEDURE — 99212 OFFICE O/P EST SF 10 MIN: CPT | Performed by: UROLOGY

## 2021-06-02 PROCEDURE — 51798 US URINE CAPACITY MEASURE: CPT | Performed by: UROLOGY

## 2021-06-02 NOTE — PROGRESS NOTES
Brittni Thomas MD   Urology Clinic follow up      Patient:  Krzysztof Marti  YOB: 1955  Date: 6/2/2021    HISTORY OF PRESENT ILLNESS:   The patient is a 78 y.o. male who presents today for evaluation of the following problem(s): BPH, LUTS, urinary retention  Overall the problem(s) : No change  Associated Symptoms: No dysuria, gross hematuria. Pain Severity:      Summary of old records: prev seen ft [facility] urologist. Was in ER recently 6/9/2018 and catheter placed and then removed. No hx of stroke, PD, SCI, DM. No  surgeries  (Patient's old records, notes and chart reviewed and summarized above.)    ED: taking 5mg cialis PRN doing well working well for him    September 23, 2020 Post op urinary retention    4/2021, went out drinking, had a lot to drink and went into retention and went to ER and had walton placed, unknown exact volume but was [place] lot\". Walton has since then been removed. Voiding is back to baseline. BPH: Still on flomax. Still cialis 5mg PRN  No UTIs or hematuria. Denies constipation    Diagnostic Psa 3.78  0.00 - 4.00 ng/mL Final 05/26/2021 10:51 AM           Last several PSA's:  Lab Results   Component Value Date    PSA 2.3 03/17/2020    PSA 2.99 03/17/2020    PSA 3.15 01/17/2020       Last total testosterone:  No results found for: TESTOSTERONE    Urinalysis today:  No results found for this visit on 06/02/21.       Last BUN and creatinine:  Lab Results   Component Value Date    BUN 20 08/24/2020     Lab Results   Component Value Date    CREATININE 1.13 08/24/2020       Imaging Reviewed during this Office Visit:   (results were independently reviewed by physician and radiology report verified)    PAST MEDICAL, FAMILY AND SOCIAL HISTORY:  Past Medical History:   Diagnosis Date    BPH (benign prostatic hypertrophy)     with outlet symptoms    Carpal tunnel syndrome, bilateral     Cervical disc disorder     Footdrop     Left    Herpes simplex labialis     episodic    Hyperlipidemia     Hypothyroidism     Mumps     Neck pain     Neuropathy     Paresthesias     Peripheral neuropathy     Pneumonia     history of    Ulnar neuropathy of right upper extremity      Past Surgical History:   Procedure Laterality Date    CARPAL TUNNEL RELEASE Right feb 2014    also ulnar nerve r elbow     COLONOSCOPY  2009    INGUINAL HERNIA REPAIR Left 9/14/2020    Laparoscopic Robotic Assisted Left Inguinal Hernia Repair performed by Guille Sal DO at . [address]      peridontal    OTHER SURGICAL HISTORY      IVIG INFUSIONS STARTING NOVEMBER OF 2015 AND EVERY 3 MONTH SINCE    TOE SURGERY       Family History   Problem Relation Age of Onset    Heart Disease Mother     Cancer Mother         lung    Cancer Father         lung    Glaucoma Neg Hx      Outpatient Medications Marked as Taking for the 6/2/21 encounter (Office Visit) with Shae Velasco MD   Medication Sig Dispense Refill    ofloxacin (OCUFLOX) 0.3 % solution 3 drops 4 times daily       tamsulosin (FLOMAX) 0.4 MG capsule TALE 1 CAPSULE ONCE A DAY 90 capsule 3    docusate sodium (COLACE) 100 MG capsule Take 1 capsule by mouth 2 times daily as needed for Constipation (use while taking narcotic pain meds) 30 capsule 0    valACYclovir (VALTREX) 1 g tablet Take 1 tablet by mouth 2 times daily 14 tablet 12    tadalafil (CIALIS) 5 MG tablet TAKE 1 TABLET DAILY as needed 16 tablet 3    b complex vitamins capsule Take 1 capsule by mouth daily.  Ascorbic Acid (VITAMIN C) 250 MG tablet Take 500 mg by mouth daily.  Omega-3 Fatty Acids (FISH OIL) 1000 MG CAPS Take 1,000 mg by mouth daily.  Vitamin D (CHOLECALCIFEROL) 1000 UNITS CAPS capsule Take 1,000 Units by mouth daily.  vitamin B-12 (CYANOCOBALAMIN) 1000 MCG tablet Take 1,000 mcg by mouth daily. Patient has no known allergies.   Social History     Tobacco Use   Smoking Status Former Smoker    Packs/day: 1.00    Years: 25.00    Pack years: 25.00    Types: Cigarettes    Quit date: 2005    Years since quittin.4   Smokeless Tobacco Never Used   Tobacco Comment    smoked for 25 years, stopped 2005. 1 pack daily       Social History     Substance and Sexual Activity   Alcohol Use Yes    Alcohol/week: 7.0 standard drinks    Types: 7 Cans of beer per week    Comment: 1 daily       REVIEW OF SYSTEMS:  Constitutional: negative  Eyes: negative  Respiratory: negative  Cardiovascular: negative  Gastrointestinal: negative  Musculoskeletal: negative  Genitourinary: negative except for what is in HPI  Skin: negative   Neurological: negative  Hematological/Lymphatic: negative  Psychological: negative    Physical Exam:    This a 78 y.o. male   Vitals:    21 0835   BP: 130/72   Pulse: 70   Resp: 16   Temp: 97.5 °F (36.4 °C)   SpO2: 97%     Constitutional: Patient in no acute distress; Neuro: alert and oriented to person place and time. Psych: Mood and affect normal.  Skin: Normal  Patel in place      Assessment and Plan      1. Urinary retention    2. Urinary frequency    3. Erectile dysfunction due to arterial insufficiency    4. BPH with obstruction/lower urinary tract symptoms    5. Elevated PSA           Plan:         BPH: continue flomax, medium stream. Not at goal. Discusses surgical options, he will hold off. ED- on cialis PRN. Urinary retention: Post void residual by bladder scanner 23cc. Constipation:  MiraLAX PRN  Elevated PSA: increase since last year, will repeat in 6 weeks. Likely residual result of recent retention.     6 week follow up                   Lm Dixon MD  [facility]

## 2021-07-07 ENCOUNTER — HOSPITAL ENCOUNTER (OUTPATIENT)
Dept: LAB | Age: 66
Discharge: HOME | End: 2021-07-07
Payer: MEDICARE

## 2021-07-07 DIAGNOSIS — B00.1 HERPES SIMPLEX LABIALIS: ICD-10-CM

## 2021-07-07 DIAGNOSIS — S05.02XD ABRASION OF LEFT CORNEA, SUBSEQUENT ENCOUNTER: ICD-10-CM

## 2021-07-07 DIAGNOSIS — Z00.00 ROUTINE GENERAL MEDICAL EXAMINATION AT A HEALTH CARE FACILITY: ICD-10-CM

## 2021-07-07 DIAGNOSIS — H02.846 EDEMA OF LEFT EYELID: ICD-10-CM

## 2021-07-07 DIAGNOSIS — N40.1 BENIGN PROSTATIC HYPERPLASIA WITH LOWER URINARY TRACT SYMPTOMS, SYMPTOM DETAILS UNSPECIFIED: ICD-10-CM

## 2021-07-07 DIAGNOSIS — G61.81 CIDP (CHRONIC INFLAMMATORY DEMYELINATING POLYNEUROPATHY) (HCC): ICD-10-CM

## 2021-07-07 DIAGNOSIS — N52.9 ERECTILE DYSFUNCTION, UNSPECIFIED ERECTILE DYSFUNCTION TYPE: ICD-10-CM

## 2021-07-07 DIAGNOSIS — N40.1 BENIGN LOCALIZED PROSTATIC HYPERPLASIA WITH LOWER URINARY TRACT SYMPTOMS (LUTS): ICD-10-CM

## 2021-07-07 DIAGNOSIS — E78.5 HYPERLIPIDEMIA, UNSPECIFIED HYPERLIPIDEMIA TYPE: ICD-10-CM

## 2021-07-07 DIAGNOSIS — R33.9 URINARY RETENTION: ICD-10-CM

## 2021-07-07 LAB
-: ABNORMAL
ABSOLUTE EOS #: 0.08 K/UL (ref 0–0.44)
ABSOLUTE IMMATURE GRANULOCYTE: <0.03 K/UL (ref 0–0.3)
ABSOLUTE LYMPH #: 1.39 K/UL (ref 1.1–3.7)
ABSOLUTE MONO #: 0.4 K/UL (ref 0.1–1.2)
ALBUMIN SERPL-MCNC: 4.2 G/DL (ref 3.5–5.2)
ALBUMIN/GLOBULIN RATIO: 1.1 (ref 1–2.5)
ALP BLD-CCNC: 70 U/L (ref 40–129)
ALT SERPL-CCNC: 31 U/L (ref 5–41)
AMORPHOUS: ABNORMAL
ANION GAP SERPL CALCULATED.3IONS-SCNC: 7 MMOL/L (ref 9–17)
AST SERPL-CCNC: 32 U/L
BACTERIA: ABNORMAL
BASOPHILS # BLD: 1 % (ref 0–2)
BASOPHILS ABSOLUTE: 0.04 K/UL (ref 0–0.2)
BILIRUB SERPL-MCNC: 0.69 MG/DL (ref 0.3–1.2)
BILIRUBIN URINE: NEGATIVE
BUN BLDV-MCNC: 20 MG/DL (ref 8–23)
BUN/CREAT BLD: 19 (ref 9–20)
CALCIUM SERPL-MCNC: 8.9 MG/DL (ref 8.6–10.4)
CASTS UA: ABNORMAL /LPF (ref 0–2)
CASTS UA: ABNORMAL /LPF (ref 0–2)
CHLORIDE BLD-SCNC: 107 MMOL/L (ref 98–107)
CO2: 26 MMOL/L (ref 20–31)
COLOR: ABNORMAL
COMMENT UA: ABNORMAL
CREAT SERPL-MCNC: 1.08 MG/DL (ref 0.7–1.2)
CRYSTALS, UA: ABNORMAL /HPF
DIFFERENTIAL TYPE: ABNORMAL
EOSINOPHILS RELATIVE PERCENT: 2 % (ref 1–4)
EPITHELIAL CELLS UA: ABNORMAL /HPF (ref 0–5)
ESTIMATED AVERAGE GLUCOSE: 103 MG/DL
GFR AFRICAN AMERICAN: >60 ML/MIN
GFR NON-AFRICAN AMERICAN: >60 ML/MIN
GFR SERPL CREATININE-BSD FRML MDRD: ABNORMAL ML/MIN/{1.73_M2}
GFR SERPL CREATININE-BSD FRML MDRD: ABNORMAL ML/MIN/{1.73_M2}
GLUCOSE BLD-MCNC: 101 MG/DL (ref 70–99)
GLUCOSE URINE: NEGATIVE
HBA1C MFR BLD: 5.2 % (ref 4–6)
HCT VFR BLD CALC: 44.6 % (ref 40.7–50.3)
HEMOGLOBIN: 15.5 G/DL (ref 13–17)
IMMATURE GRANULOCYTES: 0 %
KETONES, URINE: NEGATIVE
LEUKOCYTE ESTERASE, URINE: NEGATIVE
LYMPHOCYTES # BLD: 33 % (ref 24–43)
MCH RBC QN AUTO: 31.8 PG (ref 25.2–33.5)
MCHC RBC AUTO-ENTMCNC: 34.8 G/DL (ref 25.2–33.5)
MCV RBC AUTO: 91.4 FL (ref 82.6–102.9)
MONOCYTES # BLD: 10 % (ref 3–12)
MUCUS: ABNORMAL
NITRITE, URINE: NEGATIVE
NRBC AUTOMATED: 0 PER 100 WBC
OTHER OBSERVATIONS UA: ABNORMAL
PDW BLD-RTO: 12.4 % (ref 11.8–14.4)
PH UA: 5.5 (ref 5–6)
PLATELET # BLD: 196 K/UL (ref 138–453)
PLATELET ESTIMATE: ABNORMAL
PMV BLD AUTO: 10.1 FL (ref 8.1–13.5)
POTASSIUM SERPL-SCNC: 4.1 MMOL/L (ref 3.7–5.3)
PROTEIN UA: NEGATIVE
RBC # BLD: 4.88 M/UL (ref 4.21–5.77)
RBC # BLD: ABNORMAL 10*6/UL
RBC UA: ABNORMAL /HPF (ref 0–4)
RENAL EPITHELIAL, UA: ABNORMAL /HPF
SEG NEUTROPHILS: 54 % (ref 36–65)
SEGMENTED NEUTROPHILS ABSOLUTE COUNT: 2.27 K/UL (ref 1.5–8.1)
SODIUM BLD-SCNC: 140 MMOL/L (ref 135–144)
SPECIFIC GRAVITY UA: 1.03 (ref 1.01–1.02)
TOTAL PROTEIN: 8.2 G/DL (ref 6.4–8.3)
TRICHOMONAS: ABNORMAL
TSH SERPL DL<=0.05 MIU/L-ACNC: 2.28 MIU/L (ref 0.3–5)
TURBIDITY: ABNORMAL
URINE HGB: NEGATIVE
UROBILINOGEN, URINE: NORMAL
WBC # BLD: 4.2 K/UL (ref 3.5–11.3)
WBC # BLD: ABNORMAL 10*3/UL
WBC UA: ABNORMAL /HPF (ref 0–4)
YEAST: ABNORMAL

## 2021-07-07 PROCEDURE — 84443 ASSAY THYROID STIM HORMONE: CPT

## 2021-07-07 PROCEDURE — 80053 COMPREHEN METABOLIC PANEL: CPT

## 2021-07-07 PROCEDURE — 82306 VITAMIN D 25 HYDROXY: CPT

## 2021-07-07 PROCEDURE — 84439 ASSAY OF FREE THYROXINE: CPT

## 2021-07-07 PROCEDURE — 36415 COLL VENOUS BLD VENIPUNCTURE: CPT

## 2021-07-07 PROCEDURE — 83036 HEMOGLOBIN GLYCOSYLATED A1C: CPT

## 2021-07-07 PROCEDURE — 81001 URINALYSIS AUTO W/SCOPE: CPT

## 2021-07-07 PROCEDURE — 80061 LIPID PANEL: CPT

## 2021-07-07 PROCEDURE — 85025 COMPLETE CBC W/AUTO DIFF WBC: CPT

## 2021-07-08 LAB
CHOLESTEROL/HDL RATIO: 6.2
CHOLESTEROL: 266 MG/DL
HDLC SERPL-MCNC: 43 MG/DL
LDL CHOLESTEROL: 185 MG/DL (ref 0–130)
THYROXINE, FREE: 0.92 NG/DL (ref 0.93–1.7)
TRIGL SERPL-MCNC: 189 MG/DL
VITAMIN D 25-HYDROXY: 51.5 NG/ML (ref 30–100)
VLDLC SERPL CALC-MCNC: ABNORMAL MG/DL (ref 1–30)

## 2021-07-14 ENCOUNTER — OFFICE VISIT (OUTPATIENT)
Dept: UROLOGY | Age: 66
End: 2021-07-14
Payer: MEDICARE

## 2021-07-14 VITALS
DIASTOLIC BLOOD PRESSURE: 60 MMHG | HEIGHT: 72 IN | HEART RATE: 60 BPM | SYSTOLIC BLOOD PRESSURE: 118 MMHG | WEIGHT: 195 LBS | BODY MASS INDEX: 26.41 KG/M2

## 2021-07-14 DIAGNOSIS — N13.8 BPH WITH OBSTRUCTION/LOWER URINARY TRACT SYMPTOMS: Primary | ICD-10-CM

## 2021-07-14 DIAGNOSIS — N40.1 BPH WITH OBSTRUCTION/LOWER URINARY TRACT SYMPTOMS: Primary | ICD-10-CM

## 2021-07-14 DIAGNOSIS — R97.20 ELEVATED PSA: ICD-10-CM

## 2021-07-14 DIAGNOSIS — N52.01 ERECTILE DYSFUNCTION DUE TO ARTERIAL INSUFFICIENCY: ICD-10-CM

## 2021-07-14 DIAGNOSIS — R35.0 URINARY FREQUENCY: ICD-10-CM

## 2021-07-14 DIAGNOSIS — R33.9 URINARY RETENTION: ICD-10-CM

## 2021-07-14 DIAGNOSIS — R39.9 LOWER URINARY TRACT SYMPTOMS (LUTS): ICD-10-CM

## 2021-07-14 PROCEDURE — 51798 US URINE CAPACITY MEASURE: CPT | Performed by: UROLOGY

## 2021-07-14 PROCEDURE — 99212 OFFICE O/P EST SF 10 MIN: CPT | Performed by: UROLOGY

## 2021-07-14 PROCEDURE — 99214 OFFICE O/P EST MOD 30 MIN: CPT | Performed by: UROLOGY

## 2021-07-14 RX ORDER — TADALAFIL 5 MG/1
TABLET ORAL
Qty: 16 TABLET | Refills: 3 | Status: SHIPPED | OUTPATIENT
Start: 2021-07-14 | End: 2021-08-25

## 2021-07-14 NOTE — PROGRESS NOTES
Shefali Alba MD   Urology Clinic follow up      Patient:  Marco Antonio Alexander  YOB: 1955  Date: 7/14/2021    HISTORY OF PRESENT ILLNESS:   The patient is a 78 y.o. male who presents today for evaluation of the following problem(s): BPH, LUTS, urinary retention  Overall the problem(s) : No change  Associated Symptoms: No dysuria, gross hematuria. Pain Severity:      Summary of old records: prev seen ft [facility] urologist. Was in ER recently 6/9/2018 and catheter placed and then removed. No hx of stroke, PD, SCI, DM. No  surgeries  4/2021, went out drinking, had a lot to drink and went into retention and went to ER and had walton placed, unknown exact volume but was \"a lot\". Walton has since then been removed. Voiding is back to baseline. (Patient's old records, notes and chart reviewed and summarized above.)    ED: taking 5mg cialis PRN doing well working well for him  September 23, 2020 Post op urinary retention  No change in LUTS. BPH: Still on flomax. Still cialis 5mg PRN  No UTIs or hematuria. Denies constipation    Diagnostic Psa 3.78  0.00 - 4.00 ng/mL Final 05/26/2021 10:51 AM           Last several PSA's:  Lab Results   Component Value Date    PSA 2.3 03/17/2020    PSA 2.99 03/17/2020    PSA 3.15 01/17/2020       Last total testosterone:  No results found for: TESTOSTERONE    Urinalysis today:  No results found for this visit on 07/14/21.       Last BUN and creatinine:  Lab Results   Component Value Date    BUN 20 07/07/2021     Lab Results   Component Value Date    CREATININE 1.08 07/07/2021       Imaging Reviewed during this Office Visit:   (results were independently reviewed by physician and radiology report verified)    PAST MEDICAL, FAMILY AND SOCIAL HISTORY:  Past Medical History:   Diagnosis Date    BPH (benign prostatic hypertrophy)     with outlet symptoms    Carpal tunnel syndrome, bilateral     Cervical disc disorder     Footdrop     Left    Herpes simplex labialis  Pack years: 25.00    Types: Cigarettes    Quit date: 2005    Years since quittin.5   Smokeless Tobacco Never Used   Tobacco Comment    smoked for 25 years, stopped 2005. 1 pack daily       Social History     Substance and Sexual Activity   Alcohol Use Yes    Alcohol/week: 7.0 standard drinks    Types: 7 Cans of beer per week    Comment: 1 daily       REVIEW OF SYSTEMS:  Constitutional: negative  Eyes: negative  Respiratory: negative  Cardiovascular: negative  Gastrointestinal: negative  Musculoskeletal: negative  Genitourinary: negative except for what is in HPI  Skin: negative   Neurological: negative  Hematological/Lymphatic: negative  Psychological: negative    Physical Exam:    This a 78 y.o. male   Vitals:    21 1046   BP: 118/60   Pulse: 60     Constitutional: Patient in no acute distress; Neuro: alert and oriented to person place and time. Psych: Mood and affect normal.  Skin: Normal  Patel in place      Assessment and Plan      1. BPH with obstruction/lower urinary tract symptoms    2. Urinary retention    3. Urinary frequency    4. Elevated PSA    5. Erectile dysfunction due to arterial insufficiency    6. Lower urinary tract symptoms (LUTS)           Plan:         BPH: continue flomax, medium stream. Not at goal, but he is content at this time. ED- on cialis PRN, refill Rx sent. Urinary retention: Post void residual by bladder scanner 31 cc  Constipation:  MiraLAX PRN  Elevated PSA: increase since last year, Likely residual result of recent retention.     6 month follow up with Rose Packer MD  [facility]rology

## 2021-08-25 ENCOUNTER — OFFICE VISIT (OUTPATIENT)
Dept: FAMILY MEDICINE CLINIC | Age: 66
End: 2021-08-25
Payer: MEDICARE

## 2021-08-25 VITALS
HEIGHT: 73 IN | WEIGHT: 202 LBS | BODY MASS INDEX: 26.77 KG/M2 | RESPIRATION RATE: 16 BRPM | SYSTOLIC BLOOD PRESSURE: 120 MMHG | TEMPERATURE: 96.5 F | DIASTOLIC BLOOD PRESSURE: 76 MMHG | HEART RATE: 56 BPM | OXYGEN SATURATION: 94 %

## 2021-08-25 DIAGNOSIS — Z87.891 FORMER SMOKER: ICD-10-CM

## 2021-08-25 DIAGNOSIS — Z23 NEED FOR PNEUMOCOCCAL VACCINATION: ICD-10-CM

## 2021-08-25 DIAGNOSIS — E78.5 HYPERLIPIDEMIA, UNSPECIFIED HYPERLIPIDEMIA TYPE: ICD-10-CM

## 2021-08-25 DIAGNOSIS — E66.3 OVERWEIGHT (BMI 25.0-29.9): ICD-10-CM

## 2021-08-25 DIAGNOSIS — R94.6 THYROID FUNCTION TEST ABNORMAL: ICD-10-CM

## 2021-08-25 DIAGNOSIS — Z00.00 ROUTINE GENERAL MEDICAL EXAMINATION AT A HEALTH CARE FACILITY: Primary | ICD-10-CM

## 2021-08-25 DIAGNOSIS — R73.01 IMPAIRED FASTING GLUCOSE: ICD-10-CM

## 2021-08-25 DIAGNOSIS — B00.1 HERPES SIMPLEX LABIALIS: ICD-10-CM

## 2021-08-25 DIAGNOSIS — N52.9 ERECTILE DYSFUNCTION, UNSPECIFIED ERECTILE DYSFUNCTION TYPE: ICD-10-CM

## 2021-08-25 PROCEDURE — 99397 PER PM REEVAL EST PAT 65+ YR: CPT | Performed by: INTERNAL MEDICINE

## 2021-08-25 PROCEDURE — 90670 PCV13 VACCINE IM: CPT | Performed by: INTERNAL MEDICINE

## 2021-08-25 PROCEDURE — G0463 HOSPITAL OUTPT CLINIC VISIT: HCPCS | Performed by: INTERNAL MEDICINE

## 2021-08-25 PROCEDURE — G0438 PPPS, INITIAL VISIT: HCPCS | Performed by: INTERNAL MEDICINE

## 2021-08-25 RX ORDER — TADALAFIL 10 MG/1
10 TABLET ORAL PRN
Qty: 30 TABLET | Refills: 3 | Status: SHIPPED | OUTPATIENT
Start: 2021-08-25

## 2021-08-25 RX ORDER — HUMAN IMMUNOGLOBULIN G 20 G/200ML
LIQUID INTRAVENOUS
COMMUNITY
Start: 2021-08-09

## 2021-08-25 RX ORDER — VALACYCLOVIR HYDROCHLORIDE 1 G/1
1000 TABLET, FILM COATED ORAL 2 TIMES DAILY
Qty: 14 TABLET | Refills: 3 | Status: SHIPPED | OUTPATIENT
Start: 2021-08-25 | End: 2022-09-01 | Stop reason: SDUPTHER

## 2021-08-25 SDOH — ECONOMIC STABILITY: FOOD INSECURITY: WITHIN THE PAST 12 MONTHS, YOU WORRIED THAT YOUR FOOD WOULD RUN OUT BEFORE YOU GOT MONEY TO BUY MORE.: NEVER TRUE

## 2021-08-25 SDOH — ECONOMIC STABILITY: FOOD INSECURITY: WITHIN THE PAST 12 MONTHS, THE FOOD YOU BOUGHT JUST DIDN'T LAST AND YOU DIDN'T HAVE MONEY TO GET MORE.: NEVER TRUE

## 2021-08-25 ASSESSMENT — LIFESTYLE VARIABLES
HOW MANY STANDARD DRINKS CONTAINING ALCOHOL DO YOU HAVE ON A TYPICAL DAY: 0
HOW OFTEN DURING THE LAST YEAR HAVE YOU FOUND THAT YOU WERE NOT ABLE TO STOP DRINKING ONCE YOU HAD STARTED: NEVER
HOW OFTEN DO YOU HAVE A DRINK CONTAINING ALCOHOL: TWO TO FOUR TIMES A MONTH
HOW OFTEN DURING THE LAST YEAR HAVE YOU BEEN UNABLE TO REMEMBER WHAT HAPPENED THE NIGHT BEFORE BECAUSE YOU HAD BEEN DRINKING: NEVER
HOW OFTEN DURING THE LAST YEAR HAVE YOU FOUND THAT YOU WERE NOT ABLE TO STOP DRINKING ONCE YOU HAD STARTED: 0
HOW OFTEN DURING THE LAST YEAR HAVE YOU FAILED TO DO WHAT WAS NORMALLY EXPECTED FROM YOU BECAUSE OF DRINKING: NEVER
AUDIT TOTAL SCORE: 0
HOW OFTEN DO YOU HAVE SIX OR MORE DRINKS ON ONE OCCASION: 2
HOW OFTEN DURING THE LAST YEAR HAVE YOU NEEDED AN ALCOHOLIC DRINK FIRST THING IN THE MORNING TO GET YOURSELF GOING AFTER A NIGHT OF HEAVY DRINKING: NEVER
AUDIT-C TOTAL SCORE: 4
HOW MANY STANDARD DRINKS CONTAINING ALCOHOL DO YOU HAVE ON A TYPICAL DAY: ONE OR TWO
HOW OFTEN DURING THE LAST YEAR HAVE YOU HAD A FEELING OF GUILT OR REMORSE AFTER DRINKING: NEVER
HAS A RELATIVE, FRIEND, DOCTOR, OR ANOTHER HEALTH PROFESSIONAL EXPRESSED CONCERN ABOUT YOUR DRINKING OR SUGGESTED YOU CUT DOWN: NO
HAS A RELATIVE, FRIEND, DOCTOR, OR ANOTHER HEALTH PROFESSIONAL EXPRESSED CONCERN ABOUT YOUR DRINKING OR SUGGESTED YOU CUT DOWN: 0
HAVE YOU OR SOMEONE ELSE BEEN INJURED AS A RESULT OF YOUR DRINKING: 0
HOW OFTEN DURING THE LAST YEAR HAVE YOU HAD A FEELING OF GUILT OR REMORSE AFTER DRINKING: 0
HOW OFTEN DO YOU HAVE A DRINK CONTAINING ALCOHOL: 2
HOW OFTEN DO YOU HAVE SIX OR MORE DRINKS ON ONE OCCASION: MONTHLY
AUDIT TOTAL SCORE: 4
HOW OFTEN DURING THE LAST YEAR HAVE YOU FAILED TO DO WHAT WAS NORMALLY EXPECTED FROM YOU BECAUSE OF DRINKING: 0
HOW OFTEN DURING THE LAST YEAR HAVE YOU NEEDED AN ALCOHOLIC DRINK FIRST THING IN THE MORNING TO GET YOURSELF GOING AFTER A NIGHT OF HEAVY DRINKING: 0
AUDIT-C TOTAL SCORE: 0
HOW OFTEN DURING THE LAST YEAR HAVE YOU BEEN UNABLE TO REMEMBER WHAT HAPPENED THE NIGHT BEFORE BECAUSE YOU HAD BEEN DRINKING: 0
HAVE YOU OR SOMEONE ELSE BEEN INJURED AS A RESULT OF YOUR DRINKING: NO

## 2021-08-25 ASSESSMENT — PATIENT HEALTH QUESTIONNAIRE - PHQ9
SUM OF ALL RESPONSES TO PHQ QUESTIONS 1-9: 0
1. LITTLE INTEREST OR PLEASURE IN DOING THINGS: 0
SUM OF ALL RESPONSES TO PHQ QUESTIONS 1-9: 0
SUM OF ALL RESPONSES TO PHQ QUESTIONS 1-9: 0
2. FEELING DOWN, DEPRESSED OR HOPELESS: 0
SUM OF ALL RESPONSES TO PHQ9 QUESTIONS 1 & 2: 0

## 2021-08-25 ASSESSMENT — SOCIAL DETERMINANTS OF HEALTH (SDOH): HOW HARD IS IT FOR YOU TO PAY FOR THE VERY BASICS LIKE FOOD, HOUSING, MEDICAL CARE, AND HEATING?: NOT HARD AT ALL

## 2021-08-25 NOTE — PROGRESS NOTES
25 ppd 1 ppd quit 15-16 years  Discussed lifestyle about cholesterol. Living will-- dropped it off 3-4 years ago.   Shingles uptodate -2 March -pfizer  Need Prevnar

## 2021-08-25 NOTE — PROGRESS NOTES
Medicare Annual Wellness Visit  Name: Catherine Messina Date: 2022   MRN: [id number] Sex: Male   Age: 78 y.o. Ethnicity: Non- / Non    : 1955 Race: White (non-)      Dominic Duran is here for Medicare AWV (No concerns.   )    Screenings for behavioral, psychosocial and functional/safety risks, and cognitive dysfunction are all negative except as indicated below. These results, as well as other patient data from the [address] form, are documented in Flowsheets linked to this Encounter. 25 ppd 1 ppd quit 15-16 years  Discussed lifestyle about cholesterol- will work on taking red yeast rice capsules/omega 3 fish oil supplements  MMSE- 30/30  No falls. Living will-- dropped it off 3-4 years ago. Shingles uptodate -received 2 series shot. March -pfizer  Need Prevnar          No Known Allergies      Prior to Visit Medications    Medication Sig Taking? Authorizing Provider   tadalafil (CIALIS) 10 MG tablet Take 1 tablet by mouth as needed for Erectile Dysfunction Yes Sammy Chacon MD   valACYclovir (VALTREX) 1 g tablet Take 1 tablet by mouth 2 times daily Take 1 tablet by mouth 2 times daily prn basis when flare up. Yes Sammy Chacon MD   tamsulosin (FLOMAX) 0.4 MG capsule TALE 1 Kalee Peters MD   b complex vitamins capsule Take 1 capsule by mouth daily. Yes Historical Provider, MD   Ascorbic Acid (VITAMIN C) 250 MG tablet Take 500 mg by mouth daily. Yes Historical Provider, MD   Omega-3 Fatty Acids (FISH OIL) 1000 MG CAPS Take 1,000 mg by mouth daily. Yes Historical Provider, MD   Vitamin D (CHOLECALCIFEROL) 1000 UNITS CAPS capsule Take 1,000 Units by mouth daily. Yes Historical Provider, MD   vitamin B-12 (CYANOCOBALAMIN) 1000 MCG tablet Take 1,000 mcg by mouth daily.  Yes Historical Provider, MD   oxybutynin (DITROPAN-XL) 10 MG extended release tablet Take 1 tablet by mouth daily  Sharad Prescott MD   PRIVIGEN 20 GM/200ML SOLN solution   Historical Provider, MD         Past Medical History:   Diagnosis Date    BPH (benign prostatic hypertrophy)     with outlet symptoms    Carpal tunnel syndrome, bilateral     Cervical disc disorder     Footdrop     Left    Herpes simplex labialis     episodic    Hyperlipidemia     Hypothyroidism     Mumps     Neck pain     Neuropathy     Paresthesias     Peripheral neuropathy     Pneumonia     history of    Ulnar neuropathy of right upper extremity        Past Surgical History:   Procedure Laterality Date    CARPAL TUNNEL RELEASE Right feb 2014    also ulnar nerve r elbow     COLONOSCOPY  2009    INGUINAL HERNIA REPAIR Left 9/14/2020    Laparoscopic Robotic Assisted Left Inguinal Hernia Repair performed by Valerio Levine DO at . [address]      peridontal    OTHER SURGICAL HISTORY      IVIG INFUSIONS STARTING NOVEMBER OF 2015 AND EVERY 3 MONTH SINCE    TOE SURGERY           Family History   Problem Relation Age of Onset    Heart Disease Mother     Cancer Mother         lung    Cancer Father         lung    Glaucoma Neg Hx        CareTeam (Including outside providers/suppliers regularly involved in providing care):   Patient Care Team:  Dannie Martin MD as PCP - General (Internal Medicine)  Dannie Martin MD as PCP - [facility] Empaneled Provider  Domitila Partida (Neurology)  Guilherme Fung (Optometry)  Norris Cordoba MD as Consulting Physician (Urology)    Wt Readings from Last 3 Encounters:   01/19/22 202 lb (91.6 kg)   08/25/21 202 lb (91.6 kg)   07/14/21 195 lb (88.5 kg)     Vitals:    08/25/21 0810   BP: 120/76   Site: Right Upper Arm   Position: Sitting   Cuff Size: Medium Adult   Pulse: 56   Resp: 16   Temp: 96.5 °F (35.8 °C)   SpO2: 94%   Weight: 202 lb (91.6 kg)   Height: 6' 0.7\" (1.847 m)     Body mass index is 26.87 kg/m². Based upon direct observation of the patient, evaluation of cognition reveals recent and remote memory intact.     Patient's complete Health Risk Assessment and screening values have been reviewed and are found in Flowsheets. The following problems were reviewed today and where indicated follow up appointments were made and/or referrals ordered. Positive Risk Factor Screenings with Interventions:            General Health and ACP:  General  In general, how would you say your health is?: Good  Select all that apply: None of These  Do you get the social and emotional support that you need?: Yes  Do you have a Living Will?: Yes    Advance Directives     Power of  Living Will ACP-Advance Directive ACP-Power of     Not on File Not on File Not on File Not on File      General Health Risk Interventions:  · Has living will- dropped it off several times.  Need to ensure its registered in our system         Personalized Preventive Plan   Current Health Maintenance Status  Immunization History   Administered Date(s) Administered    COVID-19, Pfizer Purple top, DILUTE for use, 12+ yrs, 30mcg/0.3mL dose 02/19/2021, 03/11/2021    Pneumococcal Conjugate 13-valent ([id number]) 08/25/2021    Pneumococcal Polysaccharide ([id number]) 05/23/2014, 08/10/2020    Tdap (Boostrix, Adacel) 06/26/2014    Zoster Live (Zostavax) 12/29/2014        Health Maintenance   Topic Date Due    Shingles Vaccine (2 of 3) 02/23/2015    COVID-19 Vaccine (3 - Booster for Pfizer series) 08/11/2021    Flu vaccine (1) Never done    Depression Screen  08/25/2022    Annual Wellness Visit (AWV)  08/26/2022    PSA counseling  01/10/2023    DTaP/Tdap/Td vaccine (2 - Td or Tdap) 06/26/2024    Diabetes screen  07/07/2024    Lipid screen  07/07/2026    Colorectal Cancer Screen  08/28/2027    Pneumococcal 65+ years Vaccine  Completed    AAA screen  Completed    Hepatitis C screen  Completed    Hepatitis A vaccine  Aged Out    Hepatitis B vaccine  Aged Out    Hib vaccine  Aged Out    Meningococcal (ACWY) vaccine  Aged Out     Recommendations for [employer] Due: see orders and patient instructions/AVS.  . Recommended screening schedule for the next 5-10 years is provided to the patient in written form: see Patient Instructions/AVS.    Khoa Mark was seen today for medicare awv. Diagnoses and all orders for this visit:    Routine general medical examination at a health care facility  -     [facility] AAA; Future    Hyperlipidemia, unspecified hyperlipidemia type  -     [facility] AAA; Future    Thyroid function test abnormal  -     [facility] SCREENING FOR AAA; Future    Former smoker  -     [facility] AAA; Future    Erectile dysfunction, unspecified erectile dysfunction type  -     tadalafil (CIALIS) 10 MG tablet; Take 1 tablet by mouth as needed for Erectile Dysfunction    Herpes simplex labialis  -     valACYclovir (VALTREX) 1 g tablet; Take 1 tablet by mouth 2 times daily Take 1 tablet by mouth 2 times daily prn basis when flare up. Need for pneumococcal vaccination  -     PREVNAR 13 IM (Pneumococcal conjugate vaccine 13-valent)    Overweight (BMI 25.0-29. 9)    Impaired fasting glucose

## 2021-09-03 DIAGNOSIS — Z87.891 FORMER SMOKER: ICD-10-CM

## 2021-09-03 DIAGNOSIS — R94.6 THYROID FUNCTION TEST ABNORMAL: ICD-10-CM

## 2021-09-03 DIAGNOSIS — E78.5 HYPERLIPIDEMIA, UNSPECIFIED HYPERLIPIDEMIA TYPE: ICD-10-CM

## 2021-09-03 DIAGNOSIS — Z00.00 ROUTINE GENERAL MEDICAL EXAMINATION AT A HEALTH CARE FACILITY: ICD-10-CM

## 2022-01-10 ENCOUNTER — HOSPITAL ENCOUNTER (OUTPATIENT)
Dept: LAB | Age: 67
Discharge: HOME | End: 2022-01-10
Payer: MEDICARE

## 2022-01-10 DIAGNOSIS — N13.8 BPH WITH OBSTRUCTION/LOWER URINARY TRACT SYMPTOMS: ICD-10-CM

## 2022-01-10 DIAGNOSIS — R97.20 ELEVATED PSA: ICD-10-CM

## 2022-01-10 DIAGNOSIS — N40.1 BPH WITH OBSTRUCTION/LOWER URINARY TRACT SYMPTOMS: ICD-10-CM

## 2022-01-10 LAB — PROSTATE SPECIFIC ANTIGEN: 4.71 UG/L

## 2022-01-10 PROCEDURE — 84153 ASSAY OF PSA TOTAL: CPT

## 2022-01-10 PROCEDURE — 36415 COLL VENOUS BLD VENIPUNCTURE: CPT

## 2022-01-19 ENCOUNTER — OFFICE VISIT (OUTPATIENT)
Dept: UROLOGY | Age: 67
End: 2022-01-19
Payer: MEDICARE

## 2022-01-19 VITALS
HEART RATE: 72 BPM | BODY MASS INDEX: 27.36 KG/M2 | WEIGHT: 202 LBS | DIASTOLIC BLOOD PRESSURE: 70 MMHG | HEIGHT: 72 IN | SYSTOLIC BLOOD PRESSURE: 118 MMHG

## 2022-01-19 DIAGNOSIS — N52.01 ERECTILE DYSFUNCTION DUE TO ARTERIAL INSUFFICIENCY: ICD-10-CM

## 2022-01-19 DIAGNOSIS — R35.0 URINARY FREQUENCY: ICD-10-CM

## 2022-01-19 DIAGNOSIS — R97.20 ELEVATED PSA: ICD-10-CM

## 2022-01-19 DIAGNOSIS — N13.8 BPH WITH OBSTRUCTION/LOWER URINARY TRACT SYMPTOMS: Primary | ICD-10-CM

## 2022-01-19 DIAGNOSIS — R39.9 LOWER URINARY TRACT SYMPTOMS (LUTS): ICD-10-CM

## 2022-01-19 DIAGNOSIS — N40.1 BPH WITH OBSTRUCTION/LOWER URINARY TRACT SYMPTOMS: Primary | ICD-10-CM

## 2022-01-19 PROCEDURE — 99213 OFFICE O/P EST LOW 20 MIN: CPT | Performed by: UROLOGY

## 2022-01-19 PROCEDURE — 99214 OFFICE O/P EST MOD 30 MIN: CPT | Performed by: UROLOGY

## 2022-01-19 RX ORDER — OXYBUTYNIN CHLORIDE 10 MG/1
10 TABLET, EXTENDED RELEASE ORAL DAILY
Qty: 90 TABLET | Refills: 2 | Status: SHIPPED | OUTPATIENT
Start: 2022-01-19 | End: 2022-05-11

## 2022-01-19 NOTE — PROGRESS NOTES
Thirza Apgar, MD   Urology Clinic follow up      Patient:  Rai Pop  YOB: 1955  Date: 2022    HISTORY OF PRESENT ILLNESS:   The patient is a 78 y.o. male who presents today for evaluation of the following problem(s): BPH, LUTS, urinary retention  Overall the problem(s) : worsened  Associated Symptoms: No dysuria, gross hematuria. Pain Severity:      Summary of old records: prev seen ft [facility] u[facility]ist. Was in ER recently 2018 and catheter placed and then removed. No hx of stroke, PD, SCI, DM. No  surgeries  2021, went out drinking, had a lot to drink and went into retention and went to ER and had walton placed, unknown exact volume but was \"a lot\". Walton has since then been removed. Voiding is back to baseline. (Patient's old records, notes and chart reviewed and summarized above.)    ED: taking 5mg cialis PRN doing well working well for him  2020 Post op urinary retention  No change in LUTS. Frequency q1h, q1.5hr  BPH: Still on flomax. Still cialis 5mg PRN  PSA noted below, +Fhx of prostate cancer  No UTIs or hematuria. Denies constipation    Diagnostic Psa 3.78  0.00 - 4.00 ng/mL Final 2021 10:51 AM        PSA 2.3  PSA, Free Pct % 21.7            Last several PSA's:  Lab Results   Component Value Date    PSA 4.71 (H) 01/10/2022    PSA 2.3 2020    PSA 2.99 2020       Last total testosterone:  No results found for: TESTOSTERONE    Urinalysis today:  No results found for this visit on 22.       Last BUN and creatinine:  Lab Results   Component Value Date    BUN 20 2021     Lab Results   Component Value Date    CREATININE 1.08 2021       Imaging Reviewed during this Office Visit:   (results were independently reviewed by physician and radiology report verified)    PAST MEDICAL, FAMILY AND SOCIAL HISTORY:  Past Medical History:   Diagnosis Date    BPH (benign prostatic hypertrophy)     with outlet symptoms    Carpal tunnel syndrome, bilateral     Cervical disc disorder     Footdrop     Left    Herpes simplex labialis     episodic    Hyperlipidemia     Hypothyroidism     Mumps     Neck pain     Neuropathy     Paresthesias     Peripheral neuropathy     Pneumonia     history of    Ulnar neuropathy of right upper extremity      Past Surgical History:   Procedure Laterality Date    CARPAL TUNNEL RELEASE Right feb 2014    also ulnar nerve r elbow     COLONOSCOPY  2009    INGUINAL HERNIA REPAIR Left 9/14/2020    Laparoscopic Robotic Assisted Left Inguinal Hernia Repair performed by Alexander Boothe DO at [address]      peridontal    OTHER SURGICAL HISTORY      IVIG INFUSIONS STARTING NOVEMBER OF 2015 AND EVERY 3 MONTH SINCE    TOE SURGERY       Family History   Problem Relation Age of Onset    Heart Disease Mother     Cancer Mother         lung    Cancer Father         lung    Glaucoma Neg Hx      Outpatient Medications Marked as Taking for the 1/19/22 encounter (Office Visit) with Katherin Dixon MD   Medication Sig Dispense Refill    oxybutynin (DITROPAN-XL) 10 MG extended release tablet Take 1 tablet by mouth daily 90 tablet 2    PRIVIGEN 20 GM/200ML SOLN solution       tadalafil (CIALIS) 10 MG tablet Take 1 tablet by mouth as needed for Erectile Dysfunction 30 tablet 3    valACYclovir (VALTREX) 1 g tablet Take 1 tablet by mouth 2 times daily Take 1 tablet by mouth 2 times daily prn basis when flare up. 14 tablet 3    tamsulosin (FLOMAX) 0.4 MG capsule TALE 1 CAPSULE ONCE A DAY 90 capsule 3    b complex vitamins capsule Take 1 capsule by mouth daily.  Ascorbic Acid (VITAMIN C) 250 MG tablet Take 500 mg by mouth daily.  Omega-3 Fatty Acids (FISH OIL) 1000 MG CAPS Take 1,000 mg by mouth daily.  Vitamin D (CHOLECALCIFEROL) 1000 UNITS CAPS capsule Take 1,000 Units by mouth daily.  vitamin B-12 (CYANOCOBALAMIN) 1000 MCG tablet Take 1,000 mcg by mouth daily.          Patient has no known allergies. Social History     Tobacco Use   Smoking Status Former Smoker    Packs/day: 1.00    Years: 25.00    Pack years: 25.00    Types: Cigarettes    Quit date: 2005    Years since quittin.0   Smokeless Tobacco Never Used   Tobacco Comment    smoked for 25 years, stopped 2005. 1 pack daily       Social History     Substance and Sexual Activity   Alcohol Use Yes    Alcohol/week: 7.0 standard drinks    Types: 7 Cans of beer per week    Comment: 1 daily       REVIEW OF SYSTEMS:  Constitutional: negative  Eyes: negative  Respiratory: negative  Cardiovascular: negative  Gastrointestinal: negative  Musculoskeletal: negative  Genitourinary: negative except for what is in HPI  Skin: negative   Neurological: negative  Hematological/Lymphatic: negative  Psychological: negative    Physical Exam:    This a 78 y.o. male   Vitals:    22 1109   BP: 118/70   Pulse: 72     Constitutional: Patient in no acute distress; Neuro: alert and oriented to person place and time. Assessment and Plan      1. BPH with obstruction/lower urinary tract symptoms    2. Erectile dysfunction due to arterial insufficiency    3. Urinary frequency    4. Elevated PSA    5. Lower urinary tract symptoms (LUTS)           Plan:         BPH: continue flomax, medium stream. Not at goal, but he is content at this time. ED- on cialis PRN, refill Rx sent. Urinary frequency: Ditropan: Trial of Ditropan. Discussed small risk of dry mouth and constipation. Discussed at least continuing for 6 weeks as side effects usually resolve.      Urinary retention: Post void residual by bladder scanner 31 cc  Constipation:  MiraLAX PRN  Elevated PSA: recheck PSA; %free  If stays elevated then MRI    FU 6 weeks to check PSA and med check                   Bianca Mathias MD  [facility]

## 2022-02-11 DIAGNOSIS — E78.00 PURE HYPERCHOLESTEROLEMIA: Primary | ICD-10-CM

## 2022-02-16 ENCOUNTER — HOSPITAL ENCOUNTER (OUTPATIENT)
Dept: LAB | Age: 67
Discharge: HOME | End: 2022-02-16
Payer: MEDICARE

## 2022-02-16 DIAGNOSIS — N40.1 BPH WITH OBSTRUCTION/LOWER URINARY TRACT SYMPTOMS: ICD-10-CM

## 2022-02-16 DIAGNOSIS — R97.20 ELEVATED PSA: ICD-10-CM

## 2022-02-16 DIAGNOSIS — Z23 NEED FOR PNEUMOCOCCAL VACCINATION: ICD-10-CM

## 2022-02-16 DIAGNOSIS — N52.9 ERECTILE DYSFUNCTION, UNSPECIFIED ERECTILE DYSFUNCTION TYPE: ICD-10-CM

## 2022-02-16 DIAGNOSIS — E78.00 PURE HYPERCHOLESTEROLEMIA: ICD-10-CM

## 2022-02-16 DIAGNOSIS — Z87.891 FORMER SMOKER: ICD-10-CM

## 2022-02-16 DIAGNOSIS — R94.6 THYROID FUNCTION TEST ABNORMAL: ICD-10-CM

## 2022-02-16 DIAGNOSIS — B00.1 HERPES SIMPLEX LABIALIS: ICD-10-CM

## 2022-02-16 DIAGNOSIS — N13.8 BPH WITH OBSTRUCTION/LOWER URINARY TRACT SYMPTOMS: ICD-10-CM

## 2022-02-16 DIAGNOSIS — R73.01 IMPAIRED FASTING GLUCOSE: ICD-10-CM

## 2022-02-16 DIAGNOSIS — E66.3 OVERWEIGHT (BMI 25.0-29.9): ICD-10-CM

## 2022-02-16 DIAGNOSIS — Z00.00 ROUTINE GENERAL MEDICAL EXAMINATION AT A HEALTH CARE FACILITY: ICD-10-CM

## 2022-02-16 DIAGNOSIS — E78.5 HYPERLIPIDEMIA, UNSPECIFIED HYPERLIPIDEMIA TYPE: ICD-10-CM

## 2022-02-16 LAB
ALBUMIN SERPL-MCNC: 4 G/DL (ref 3.5–5.2)
ALBUMIN/GLOBULIN RATIO: 1.1 (ref 1–2.5)
ALP BLD-CCNC: 69 U/L (ref 40–129)
ALT SERPL-CCNC: 27 U/L (ref 5–41)
ANION GAP SERPL CALCULATED.3IONS-SCNC: 8 MMOL/L (ref 9–17)
AST SERPL-CCNC: 26 U/L
BILIRUB SERPL-MCNC: 0.86 MG/DL (ref 0.3–1.2)
BUN BLDV-MCNC: 18 MG/DL (ref 8–23)
BUN/CREAT BLD: 19 (ref 9–20)
CALCIUM SERPL-MCNC: 9 MG/DL (ref 8.6–10.4)
CHLORIDE BLD-SCNC: 106 MMOL/L (ref 98–107)
CO2: 24 MMOL/L (ref 20–31)
CREAT SERPL-MCNC: 0.97 MG/DL (ref 0.7–1.2)
GFR AFRICAN AMERICAN: >60 ML/MIN
GFR NON-AFRICAN AMERICAN: >60 ML/MIN
GFR SERPL CREATININE-BSD FRML MDRD: ABNORMAL ML/MIN/{1.73_M2}
GFR SERPL CREATININE-BSD FRML MDRD: ABNORMAL ML/MIN/{1.73_M2}
GLUCOSE BLD-MCNC: 93 MG/DL (ref 70–99)
POTASSIUM SERPL-SCNC: 3.9 MMOL/L (ref 3.7–5.3)
SODIUM BLD-SCNC: 138 MMOL/L (ref 135–144)
TOTAL PROTEIN: 7.7 G/DL (ref 6.4–8.3)

## 2022-02-16 PROCEDURE — 83036 HEMOGLOBIN GLYCOSYLATED A1C: CPT

## 2022-02-16 PROCEDURE — 84154 ASSAY OF PSA FREE: CPT

## 2022-02-16 PROCEDURE — 80053 COMPREHEN METABOLIC PANEL: CPT

## 2022-02-16 PROCEDURE — 80061 LIPID PANEL: CPT

## 2022-02-16 PROCEDURE — 36415 COLL VENOUS BLD VENIPUNCTURE: CPT

## 2022-02-17 LAB
CHOLESTEROL/HDL RATIO: 6.8
CHOLESTEROL: 250 MG/DL
ESTIMATED AVERAGE GLUCOSE: 108 MG/DL
HBA1C MFR BLD: 5.4 % (ref 4–6)
HDLC SERPL-MCNC: 37 MG/DL
LDL CHOLESTEROL: 170 MG/DL (ref 0–130)
PROSTATE SPECIFIC ANTIGEN FREE: 0.8 UG/L
PROSTATE SPECIFIC ANTIGEN PERCENT FREE: 25 %
PROSTATE SPECIFIC ANTIGEN: 3.2 UG/L (ref 0–4)
TRIGL SERPL-MCNC: 214 MG/DL
VLDLC SERPL CALC-MCNC: ABNORMAL MG/DL (ref 1–30)

## 2022-02-23 ENCOUNTER — OFFICE VISIT (OUTPATIENT)
Dept: FAMILY MEDICINE CLINIC | Age: 67
End: 2022-02-23
Payer: MEDICARE

## 2022-02-23 VITALS
HEIGHT: 73 IN | RESPIRATION RATE: 16 BRPM | TEMPERATURE: 96.2 F | BODY MASS INDEX: 27.73 KG/M2 | SYSTOLIC BLOOD PRESSURE: 118 MMHG | WEIGHT: 209.2 LBS | DIASTOLIC BLOOD PRESSURE: 70 MMHG | HEART RATE: 56 BPM | OXYGEN SATURATION: 94 %

## 2022-02-23 DIAGNOSIS — E78.1 HYPERTRIGLYCERIDEMIA: ICD-10-CM

## 2022-02-23 DIAGNOSIS — G61.81 CIDP (CHRONIC INFLAMMATORY DEMYELINATING POLYNEUROPATHY) (HCC): ICD-10-CM

## 2022-02-23 DIAGNOSIS — E78.2 MIXED HYPERLIPIDEMIA: Primary | ICD-10-CM

## 2022-02-23 PROCEDURE — 99212 OFFICE O/P EST SF 10 MIN: CPT | Performed by: INTERNAL MEDICINE

## 2022-02-23 PROCEDURE — 99214 OFFICE O/P EST MOD 30 MIN: CPT | Performed by: INTERNAL MEDICINE

## 2022-02-23 SDOH — ECONOMIC STABILITY: FOOD INSECURITY: WITHIN THE PAST 12 MONTHS, YOU WORRIED THAT YOUR FOOD WOULD RUN OUT BEFORE YOU GOT MONEY TO BUY MORE.: NEVER TRUE

## 2022-02-23 SDOH — ECONOMIC STABILITY: FOOD INSECURITY: WITHIN THE PAST 12 MONTHS, THE FOOD YOU BOUGHT JUST DIDN'T LAST AND YOU DIDN'T HAVE MONEY TO GET MORE.: NEVER TRUE

## 2022-02-23 ASSESSMENT — ENCOUNTER SYMPTOMS
SHORTNESS OF BREATH: 0
COUGH: 0
SINUS PAIN: 0
BLOOD IN STOOL: 0
TROUBLE SWALLOWING: 0
ABDOMINAL PAIN: 0
DIARRHEA: 0
SORE THROAT: 0
RHINORRHEA: 0
CHEST TIGHTNESS: 0

## 2022-02-23 ASSESSMENT — PATIENT HEALTH QUESTIONNAIRE - PHQ9
SUM OF ALL RESPONSES TO PHQ QUESTIONS 1-9: 0
1. LITTLE INTEREST OR PLEASURE IN DOING THINGS: 0
SUM OF ALL RESPONSES TO PHQ9 QUESTIONS 1 & 2: 0
SUM OF ALL RESPONSES TO PHQ QUESTIONS 1-9: 0
SUM OF ALL RESPONSES TO PHQ QUESTIONS 1-9: 0
2. FEELING DOWN, DEPRESSED OR HOPELESS: 0
SUM OF ALL RESPONSES TO PHQ QUESTIONS 1-9: 0

## 2022-02-23 ASSESSMENT — SOCIAL DETERMINANTS OF HEALTH (SDOH): HOW HARD IS IT FOR YOU TO PAY FOR THE VERY BASICS LIKE FOOD, HOUSING, MEDICAL CARE, AND HEATING?: NOT HARD AT ALL

## 2022-02-23 NOTE — PROGRESS NOTES
[facility] [address]  Dept: [phone]  Dept Fax: [phone]  Loc: [phone]     Visit Date:  2/23/2022    Patient:  Kacey Hanna  YOB: 1955    HPI:   Kacey Hanna presents today for   Chief Complaint   Patient presents with    Results     Lipid panel 2/16/2022, Impaired FBS- [facility] 2/16/22= 5.4   . HPI 78year old male is coming in to discuss her recent results. Hyperlipidemia:  Patient is not following a low fat, low cholesterol diet. He is  exercising regularly. OTC Supplements: none. Lab Results   Component Value Date    CHOL 250 (H) 02/16/2022    TRIG 214 (H) 02/16/2022    HDL 37 (L) 02/16/2022     Lab Results   Component Value Date    ALT 27 02/16/2022    AST 26 02/16/2022            Component Ref Range & Units 2/16/22 1052 7/7/21 0805   Hemoglobin A1C 4.0 - 6.0 % 5.4  5.2    Estimated Avg Glucose mg/dL 108  103 CM      Component Ref Range & Units 2/16/22 1051 7/7/21 0805 7/30/19 0932 7/18/18 0925 7/14/17 0951 8/11/16 0858 8/10/15 0851   Cholesterol <200 mg/dL 250 High   266 High  CM  225 High  CM  204 High  CM  230 High  [facility]  256 High  CM  249 High  CM    Comment:     Cholesterol Guidelines:       <200  Desirable    200-240  Borderline       >240  Undesirable        HDL >40 mg/dL 37 Low   43 CM  40 Low  CM  43 CM  38 Low  [facility]  31 Low  CM  32 Low  CM    Comment:     HDL Guidelines:     <40     Undesirable    40-59    Borderline     >59     Desirable        LDL Cholesterol 0 - 130 mg/dL 170 High   185 High  CM  165 High  CM  145 High  CM  152 High  [facility]  178 High  CM  177 High  CM    Comment:     LDL Guidelines:      <100    Desirable    100-129   Near to/above Desirable    130-159   Borderline       >159   Undesirable       Direct (measured) LDL and calculated LDL are not interchangeable tests.     Chol/HDL Ratio <5 6.8 High  6.2 High  CM  5.6 High  CM  4.7 CM  6.1 High  [facility]  8.3 High   7.8 High           Medications  Prior to Visit Medications    Medication Sig Taking? Authorizing Provider   oxybutynin (DITROPAN-XL) 10 MG extended release tablet Take 1 tablet by mouth daily Yes Nasra Castañdea MD   PRIVIGEN 20 GM/200ML SOLN solution  Yes Historical Provider, MD   tadalafil (CIALIS) 10 MG tablet Take 1 tablet by mouth as needed for Erectile Dysfunction Yes Amy Ahumada MD   valACYclovir (VALTREX) 1 g tablet Take 1 tablet by mouth 2 times daily Take 1 tablet by mouth 2 times daily prn basis when flare up. Yes Amy Ahumada MD   tamsulosin (FLOMAX) 0.4 MG capsule TALE 1 Sadia Gagnon MD   b complex vitamins capsule Take 1 capsule by mouth daily. Yes Historical Provider, MD   Ascorbic Acid (VITAMIN C) 250 MG tablet Take 500 mg by mouth daily. Yes Historical Provider, MD   Omega-3 Fatty Acids (FISH OIL) 1000 MG CAPS Take 1,000 mg by mouth daily. Yes Historical Provider, MD   Vitamin D (CHOLECALCIFEROL) 1000 UNITS CAPS capsule Take 1,000 Units by mouth daily. Yes Historical Provider, MD   vitamin B-12 (CYANOCOBALAMIN) 1000 MCG tablet Take 1,000 mcg by mouth daily. Yes Historical Provider, MD        Allergies:  has No Known Allergies. Past Medical History:   has a past medical history of BPH (benign prostatic hypertrophy), Carpal tunnel syndrome, bilateral, Cervical disc disorder, Footdrop, Herpes simplex labialis, Hyperlipidemia, Hypothyroidism, Mumps, Neck pain, Neuropathy, Paresthesias, Peripheral neuropathy, Pneumonia, and Ulnar neuropathy of right upper extremity. Past Surgical History   has a past surgical history that includes Mouth surgery; Toe Surgery; Colonoscopy (2009); Carpal tunnel release (Right, feb 2014); other surgical history; and Inguinal hernia repair (Left, 9/14/2020).     Family History  family history includes Cancer in his father and mother; Heart Disease in his mother. Social History   reports that he quit smoking about 17 years ago. His smoking use included cigarettes. He has a 25.00 pack-year smoking history. He has never used smokeless tobacco. He reports current alcohol use of about 7.0 standard drinks of alcohol per week. He reports that he does not use drugs. Health Maintenance:    Health Maintenance   Topic Date Due    Shingles Vaccine (2 of 3) 02/23/2015    Flu vaccine (1) Never done    Depression Screen  08/25/2022    Annual Wellness Visit (AWV)  08/26/2022    DTaP/Tdap/Td vaccine (2 - Td or Tdap) 06/26/2024    Lipid screen  02/16/2027    Colorectal Cancer Screen  08/28/2027    Pneumococcal 65+ years Vaccine  Completed    COVID-19 Vaccine  Completed    AAA screen  Completed    Hepatitis C screen  Completed    Hepatitis A vaccine  Aged Out    Hepatitis B vaccine  Aged Out    Hib vaccine  Aged Out    Meningococcal (ACWY) vaccine  Aged Out       Subjective:      Review of Systems   Constitutional: Negative for fatigue, fever and unexpected weight change. HENT: Negative for ear pain, postnasal drip, rhinorrhea, sinus pain, sore throat and trouble swallowing. Eyes: Negative for visual disturbance. Respiratory: Negative for cough, chest tightness and shortness of breath. Cardiovascular: Negative for chest pain and leg swelling. Gastrointestinal: Negative for abdominal pain, blood in stool and diarrhea. Endocrine: Negative for polyuria. Genitourinary: Negative for difficulty urinating and flank pain. Musculoskeletal: Negative for arthralgias, joint swelling and myalgias. Skin: Negative for rash. Allergic/Immunologic: Negative for environmental allergies. Neurological: Negative for weakness, light-headedness, numbness and headaches. Hematological: Negative for adenopathy. Psychiatric/Behavioral: Negative for behavioral problems and suicidal ideas. The patient is not nervous/anxious.         Objective:     /70 (Site: Left Upper Arm, Position: Sitting, Cuff Size: Medium Adult)   Pulse 56   Temp 96.2 °F (35.7 °C)   Resp 16   Ht 6' 1.3\" (1.862 m)   Wt 209 lb 3.2 oz (94.9 kg)   SpO2 94%   BMI 27.38 kg/m²     Physical Exam  Vitals and nursing note reviewed. HENT:      Head: Normocephalic and atraumatic. Cardiovascular:      Rate and Rhythm: Normal rate and regular rhythm. Pulmonary:      Breath sounds: No stridor. Musculoskeletal:      Comments: Left foot drop   Skin:     General: Skin is warm. Neurological:      Mental Status: He is oriented to person, place, and time. Mental status is at baseline. Psychiatric:         Mood and Affect: Mood normal.             Assessment       Diagnosis Orders   1. Mixed hyperlipidemia     2. Hypertriglyceridemia     3. CIDP (chronic inflammatory demyelinating polyneuropathy) (HCC)           PLAN   Discussed extensively >45 mins on lifestyle changes/low fat/low sugar diet. Can try supplements like red yeast rice capsules/omega 3 fish oil supplements. No orders of the defined types were placed in this encounter. No follow-ups on file. Patient given educational materials - see patient instructions. Discussed use, benefit, and side effects of prescribed medications. All patient questions answered. Pt voiced understanding. Reviewed health maintenance.        Electronically signed Cherrie Roth MD on 2/23/2022 at 8:21 AM EST

## 2022-03-09 ENCOUNTER — OFFICE VISIT (OUTPATIENT)
Dept: UROLOGY | Age: 67
End: 2022-03-09
Payer: MEDICARE

## 2022-03-09 VITALS
WEIGHT: 207 LBS | HEART RATE: 60 BPM | DIASTOLIC BLOOD PRESSURE: 70 MMHG | SYSTOLIC BLOOD PRESSURE: 120 MMHG | OXYGEN SATURATION: 95 % | BODY MASS INDEX: 27.09 KG/M2

## 2022-03-09 DIAGNOSIS — N40.1 BPH WITH OBSTRUCTION/LOWER URINARY TRACT SYMPTOMS: Primary | ICD-10-CM

## 2022-03-09 DIAGNOSIS — R35.0 URINARY FREQUENCY: ICD-10-CM

## 2022-03-09 DIAGNOSIS — N13.8 BPH WITH OBSTRUCTION/LOWER URINARY TRACT SYMPTOMS: Primary | ICD-10-CM

## 2022-03-09 DIAGNOSIS — R39.9 LOWER URINARY TRACT SYMPTOMS (LUTS): ICD-10-CM

## 2022-03-09 DIAGNOSIS — R97.20 ELEVATED PSA: ICD-10-CM

## 2022-03-09 DIAGNOSIS — N52.01 ERECTILE DYSFUNCTION DUE TO ARTERIAL INSUFFICIENCY: ICD-10-CM

## 2022-03-09 PROCEDURE — 99213 OFFICE O/P EST LOW 20 MIN: CPT | Performed by: UROLOGY

## 2022-03-09 PROCEDURE — 99214 OFFICE O/P EST MOD 30 MIN: CPT | Performed by: UROLOGY

## 2022-03-09 RX ORDER — MIRABEGRON 50 MG/1
50 TABLET, FILM COATED, EXTENDED RELEASE ORAL DAILY
Qty: 30 TABLET | Refills: 2 | Status: SHIPPED | OUTPATIENT
Start: 2022-03-09 | End: 2022-05-11

## 2022-03-09 RX ORDER — TAMSULOSIN HYDROCHLORIDE 0.4 MG/1
CAPSULE ORAL
Qty: 90 CAPSULE | Refills: 3 | Status: SHIPPED | OUTPATIENT
Start: 2022-03-09 | End: 2022-05-11

## 2022-03-09 NOTE — PROGRESS NOTES
Gentry Curling, MD   Urology Clinic follow up      Patient:  Vic Edward  YOB: 1955  Date: 3/9/2022    HISTORY OF PRESENT ILLNESS:   The patient is a 78 y.o. male who presents today for evaluation of the following problem(s): BPH, LUTS, urinary retention  Overall the problem(s) : stable  Associated Symptoms: No dysuria, gross hematuria. Pain Severity:      Summary of old records: prev seen ft [facility] urologist. Was in ER recently 6/9/2018 and catheter placed and then removed. No hx of stroke, PD, SCI, DM. No  surgeries  4/2021, went out drinking, had a lot to drink and went into retention and went to ER and had walton placed, unknown exact volume but was \"a lot\". Walton has since then been removed. Voiding is back to baseline. (Patient's old records, notes and chart reviewed and summarized above.)    ED: taking 5mg cialis PRN doing well working well for him  September 23, 2020 Post op urinary retention  No change in LUTS. Frequency q1h, q1.5hr- unchanged despite taking Ditropan for 1 month  BPH: taking flomax. Still cialis 5mg PRN  PSA noted below, +Fhx of prostate cancer  No UTIs or hematuria. Denies constipation    Diagnostic Psa 3.78  0.00 - 4.00 ng/mL Final 05/26/2021 10:51 AM       PSA down to 3.2 which is around his baseline. Last several PSA's:  Lab Results   Component Value Date    PSA 3.2 02/16/2022    PSA 4.71 (H) 01/10/2022    PSA 2.3 03/17/2020    PSA 2.99 03/17/2020       Last total testosterone:  No results found for: TESTOSTERONE    Urinalysis today:  No results found for this visit on 03/09/22.       Last BUN and creatinine:  Lab Results   Component Value Date    BUN 18 02/16/2022     Lab Results   Component Value Date    CREATININE 0.97 02/16/2022       Imaging Reviewed during this Office Visit:   (results were independently reviewed by physician and radiology report verified)    PAST MEDICAL, FAMILY AND SOCIAL HISTORY:  Past Medical History:   Diagnosis Date    BPH (benign prostatic hypertrophy)     with outlet symptoms    Carpal tunnel syndrome, bilateral     Cervical disc disorder     Footdrop     Left    Herpes simplex labialis     episodic    Hyperlipidemia     Hypothyroidism     Mumps     Neck pain     Neuropathy     Paresthesias     Peripheral neuropathy     Pneumonia     history of    Ulnar neuropathy of right upper extremity      Past Surgical History:   Procedure Laterality Date    CARPAL TUNNEL RELEASE Right feb 2014    also ulnar nerve r elbow     COLONOSCOPY  2009    INGUINAL HERNIA REPAIR Left 9/14/2020    Laparoscopic Robotic Assisted Left Inguinal Hernia Repair performed by Oh Hobson DO at [address]      peridontal    OTHER SURGICAL HISTORY      IVIG INFUSIONS STARTING NOVEMBER OF 2015 AND EVERY 3 MONTH SINCE    TOE SURGERY       Family History   Problem Relation Age of Onset    Heart Disease Mother     Cancer Mother         lung    Cancer Father         lung    Glaucoma Neg Hx      Outpatient Medications Marked as Taking for the 3/9/22 encounter (Office Visit) with Ladonna Gregory MD   Medication Sig Dispense Refill    MYRBETRIQ 50 MG TB24 Take 50 mg by mouth daily 30 tablet 2    tamsulosin (FLOMAX) 0.4 MG capsule TALE 1 CAPSULE ONCE A DAY 90 capsule 3    PRIVIGEN 20 GM/200ML SOLN solution       tadalafil (CIALIS) 10 MG tablet Take 1 tablet by mouth as needed for Erectile Dysfunction 30 tablet 3    valACYclovir (VALTREX) 1 g tablet Take 1 tablet by mouth 2 times daily Take 1 tablet by mouth 2 times daily prn basis when flare up. 14 tablet 3    b complex vitamins capsule Take 1 capsule by mouth daily.  Ascorbic Acid (VITAMIN C) 250 MG tablet Take 500 mg by mouth daily.  Omega-3 Fatty Acids (FISH OIL) 1000 MG CAPS Take 1,000 mg by mouth daily.  Vitamin D (CHOLECALCIFEROL) 1000 UNITS CAPS capsule Take 1,000 Units by mouth daily.       vitamin B-12 (CYANOCOBALAMIN) 1000 MCG tablet Take 1,000 mcg by mouth daily. Patient has no known allergies. Social History     Tobacco Use   Smoking Status Former Smoker    Packs/day: 1.00    Years: 25.00    Pack years: 25.00    Types: Cigarettes    Quit date: 2005    Years since quittin.1   Smokeless Tobacco Never Used   Tobacco Comment    smoked for 25 years, stopped 2005. 1 pack daily       Social History     Substance and Sexual Activity   Alcohol Use Yes    Alcohol/week: 7.0 standard drinks    Types: 7 Cans of beer per week    Comment: 1 daily       REVIEW OF SYSTEMS:  Constitutional: negative  Eyes: negative  Respiratory: negative  Cardiovascular: negative  Gastrointestinal: negative  Musculoskeletal: negative  Genitourinary: negative except for what is in HPI  Skin: negative   Neurological: negative  Hematological/Lymphatic: negative  Psychological: negative    Physical Exam:    This a 78 y.o. male   Vitals:    22 0838   BP: 120/70   Pulse: 60   SpO2: 95%     Constitutional: Patient in no acute distress; Neuro: alert and oriented to person place and time. Assessment and Plan      1. BPH with obstruction/lower urinary tract symptoms    2. Erectile dysfunction due to arterial insufficiency    3. Urinary frequency    4. Elevated PSA    5. Lower urinary tract symptoms (LUTS)           Plan:         BPH: not at goal, stream is fair; Flomax refilled. ED- on cialis PRN, refill Rx sent. Urinary frequency: Ditropan did not work. Will try Myrbetriq 50 mg, samples given x 1 month. Rx also give.     Discussed cystoscopy if LUTS do not improve with above regimen    Urinary retention: previous Post void residual by bladder scanner 31 cc  Constipation:  MiraLAX PRN  Elevated PSA: returned to baseline, monitor q 6-12 months    FU 6-8 weeks for med check                   Maria L FridayMD [facility]

## 2022-05-11 ENCOUNTER — OFFICE VISIT (OUTPATIENT)
Dept: UROLOGY | Age: 67
End: 2022-05-11
Payer: MEDICARE

## 2022-05-11 VITALS
DIASTOLIC BLOOD PRESSURE: 70 MMHG | WEIGHT: 205.2 LBS | SYSTOLIC BLOOD PRESSURE: 118 MMHG | BODY MASS INDEX: 26.85 KG/M2 | HEART RATE: 61 BPM | OXYGEN SATURATION: 93 %

## 2022-05-11 DIAGNOSIS — N52.01 ERECTILE DYSFUNCTION DUE TO ARTERIAL INSUFFICIENCY: ICD-10-CM

## 2022-05-11 DIAGNOSIS — R33.9 INCOMPLETE BLADDER EMPTYING: ICD-10-CM

## 2022-05-11 DIAGNOSIS — R33.9 URINARY RETENTION: ICD-10-CM

## 2022-05-11 DIAGNOSIS — R39.9 LOWER URINARY TRACT SYMPTOMS (LUTS): ICD-10-CM

## 2022-05-11 DIAGNOSIS — R35.0 URINARY FREQUENCY: ICD-10-CM

## 2022-05-11 DIAGNOSIS — N13.8 BPH WITH OBSTRUCTION/LOWER URINARY TRACT SYMPTOMS: Primary | ICD-10-CM

## 2022-05-11 DIAGNOSIS — N40.1 BPH WITH OBSTRUCTION/LOWER URINARY TRACT SYMPTOMS: Primary | ICD-10-CM

## 2022-05-11 PROCEDURE — 99213 OFFICE O/P EST LOW 20 MIN: CPT | Performed by: UROLOGY

## 2022-05-11 PROCEDURE — 99214 OFFICE O/P EST MOD 30 MIN: CPT | Performed by: UROLOGY

## 2022-05-11 RX ORDER — TAMSULOSIN HYDROCHLORIDE 0.4 MG/1
0.4 CAPSULE ORAL 2 TIMES DAILY
Qty: 120 CAPSULE | Refills: 1 | Status: SHIPPED | OUTPATIENT
Start: 2022-05-11

## 2022-05-11 RX ORDER — SOLIFENACIN SUCCINATE 10 MG/1
10 TABLET, FILM COATED ORAL 2 TIMES DAILY
Qty: 120 TABLET | Refills: 1 | Status: SHIPPED | OUTPATIENT
Start: 2022-05-11 | End: 2022-07-13

## 2022-05-11 NOTE — PROGRESS NOTES
Marilee Guevara MD   Urology Clinic follow up      Patient:  Austen Fernández  YOB: 1955  Date: 5/11/2022    HISTORY OF PRESENT ILLNESS:   The patient is a 78 y.o. male who presents today for evaluation of the following problem(s): BPH, LUTS, urinary retention  Overall the problem(s) : unchanged, persisitent  Associated Symptoms: No dysuria, gross hematuria. Pain Severity:      Summary of old records: prev seen ft [facility] u[facility]. Was in ER recently 6/9/2018 and catheter placed and then removed. No hx of stroke, PD, SCI, DM. No  surgeries  4/2021, went out drinking, had a lot to drink and went into retention and went to ER and had walton placed, unknown exact volume but was \"a lot\". Walton has since then been removed. Voiding is back to baseline. (Patient's old records, notes and chart reviewed and summarized above.)    ED: taking 10 mg cialis PRN doing well working well for him  September 23, 2020 Post op urinary retention  No change in LUTS. Frequency q1h, q1.5hr- unchanged   BPH: taking flomax. PSA noted below, +Fhx of prostate cancer  No UTIs or hematuria. Denies constipation    Diagnostic Psa 3.78  0.00 - 4.00 ng/mL Final 05/26/2021 10:51 AM       PSA down to 3.2 which is around his baseline. Last several PSA's:  Lab Results   Component Value Date    PSA 3.2 02/16/2022    PSA 4.71 (H) 01/10/2022    PSA 2.3 03/17/2020    PSA 2.99 03/17/2020       Last total testosterone:  No results found for: TESTOSTERONE    Urinalysis today:  No results found for this visit on 05/11/22.       Last BUN and creatinine:  Lab Results   Component Value Date    BUN 18 02/16/2022     Lab Results   Component Value Date    CREATININE 0.97 02/16/2022       Imaging Reviewed during this Office Visit:   (results were independently reviewed by physician and radiology report verified)    PAST MEDICAL, FAMILY AND SOCIAL HISTORY:  Past Medical History:   Diagnosis Date    BPH (benign prostatic hypertrophy) with outlet symptoms    Carpal tunnel syndrome, bilateral     Cervical disc disorder     Footdrop     Left    Herpes simplex labialis     episodic    Hyperlipidemia     Hypothyroidism     Mumps     Neck pain     Neuropathy     Paresthesias     Peripheral neuropathy     Pneumonia     history of    Ulnar neuropathy of right upper extremity      Past Surgical History:   Procedure Laterality Date    CARPAL TUNNEL RELEASE Right feb 2014    also ulnar nerve r elbow     COLONOSCOPY  2009    INGUINAL HERNIA REPAIR Left 9/14/2020    Laparoscopic Robotic Assisted Left Inguinal Hernia Repair performed by Flora Horta DO at [address]      peridontal    OTHER SURGICAL HISTORY      IVIG INFUSIONS STARTING NOVEMBER OF 2015 AND EVERY 3 MONTH SINCE    TOE SURGERY       Family History   Problem Relation Age of Onset    Heart Disease Mother     Cancer Mother         lung    Cancer Father         lung    Glaucoma Neg Hx      Outpatient Medications Marked as Taking for the 5/11/22 encounter (Office Visit) with Naomy Ellis MD   Medication Sig Dispense Refill    tamsulosin (FLOMAX) 0.4 MG capsule Take 1 capsule by mouth in the morning and at bedtime TALE 1 CAPSULE ONCE A  capsule 1    solifenacin (VESICARE) 10 MG tablet Take 1 tablet by mouth in the morning and at bedtime 120 tablet 1    PRIVIGEN 20 GM/200ML SOLN solution       tadalafil (CIALIS) 10 MG tablet Take 1 tablet by mouth as needed for Erectile Dysfunction 30 tablet 3    valACYclovir (VALTREX) 1 g tablet Take 1 tablet by mouth 2 times daily Take 1 tablet by mouth 2 times daily prn basis when flare up. 14 tablet 3    b complex vitamins capsule Take 1 capsule by mouth daily.  Ascorbic Acid (VITAMIN C) 250 MG tablet Take 500 mg by mouth daily.  Omega-3 Fatty Acids (FISH OIL) 1000 MG CAPS Take 1,000 mg by mouth daily.  Vitamin D (CHOLECALCIFEROL) 1000 UNITS CAPS capsule Take 1,000 Units by mouth daily.       vitamin B-12 (CYANOCOBALAMIN) 1000 MCG tablet Take 1,000 mcg by mouth daily. Patient has no known allergies. Social History     Tobacco Use   Smoking Status Former Smoker    Packs/day: 1.00    Years: 25.00    Pack years: 25.00    Types: Cigarettes    Quit date: 2005    Years since quittin.3   Smokeless Tobacco Never Used   Tobacco Comment    smoked for 25 years, stopped 2005. 1 pack daily       Social History     Substance and Sexual Activity   Alcohol Use Yes    Alcohol/week: 7.0 standard drinks    Types: 7 Cans of beer per week    Comment: 1 daily       REVIEW OF SYSTEMS:  Constitutional: negative  Eyes: negative  Respiratory: negative  Cardiovascular: negative  Gastrointestinal: negative  Musculoskeletal: negative  Genitourinary: negative except for what is in HPI  Skin: negative   Neurological: negative  Hematological/Lymphatic: negative  Psychological: negative    Physical Exam:    This a 78 y.o. male   Vitals:    22 0938   BP: 118/70   Pulse: 61   SpO2: 93%     Constitutional: Patient in no acute distress; Neuro: alert and oriented to person place and time. Assessment and Plan      1. BPH with obstruction/lower urinary tract symptoms    2. Erectile dysfunction due to arterial insufficiency    3. Urinary frequency    4. Lower urinary tract symptoms (LUTS)    5. Urinary retention    6. Incomplete bladder emptying           Plan:         BPH: not at goal, stream is sometimes weak/fair; increase Flomax to BID. ED- on cialis PRN, refill Rx sent. Urinary frequency: Ditropan did not work. Will try Myrbetriq 50 mg x 2 months, 30% improvement. Symptoms still bothersome. Still frequency Q1-1.5 hours.   Will try Vesicare 10 mg BID  Discussed cystoscopy if LUTS do not improve with above regimen    Urinary retention: previous Post void residual by bladder scanner 31 cc  Constipation:  MiraLAX PRN  Elevated PSA: returned to baseline, monitor q 6-12 months    FU 2 months for med check                   Rajni Uribe MD  [facility]

## 2022-05-23 ENCOUNTER — OFFICE VISIT (OUTPATIENT)
Dept: FAMILY MEDICINE CLINIC | Age: 67
End: 2022-05-23
Payer: MEDICARE

## 2022-05-23 VITALS
TEMPERATURE: 96.8 F | DIASTOLIC BLOOD PRESSURE: 78 MMHG | HEIGHT: 73 IN | RESPIRATION RATE: 16 BRPM | HEART RATE: 60 BPM | SYSTOLIC BLOOD PRESSURE: 116 MMHG | WEIGHT: 205.6 LBS | BODY MASS INDEX: 27.25 KG/M2 | OXYGEN SATURATION: 96 %

## 2022-05-23 DIAGNOSIS — R73.9 HYPERGLYCEMIA: ICD-10-CM

## 2022-05-23 DIAGNOSIS — E78.2 MIXED HYPERLIPIDEMIA: Primary | ICD-10-CM

## 2022-05-23 DIAGNOSIS — R94.6 THYROID FUNCTION TEST ABNORMAL: ICD-10-CM

## 2022-05-23 DIAGNOSIS — R29.898 RIGHT HAND WEAKNESS: ICD-10-CM

## 2022-05-23 DIAGNOSIS — G56.21 ULNAR NEUROPATHY OF RIGHT UPPER EXTREMITY: ICD-10-CM

## 2022-05-23 DIAGNOSIS — M21.371 RIGHT FOOT DROP: ICD-10-CM

## 2022-05-23 LAB — HBA1C MFR BLD: 5.1 %

## 2022-05-23 PROCEDURE — 83036 HEMOGLOBIN GLYCOSYLATED A1C: CPT | Performed by: FAMILY MEDICINE

## 2022-05-23 PROCEDURE — 99214 OFFICE O/P EST MOD 30 MIN: CPT | Performed by: FAMILY MEDICINE

## 2022-05-23 PROCEDURE — PBSHW POCT GLYCOSYLATED HEMOGLOBIN (HGB A1C): Performed by: FAMILY MEDICINE

## 2022-05-23 PROCEDURE — 99213 OFFICE O/P EST LOW 20 MIN: CPT | Performed by: FAMILY MEDICINE

## 2022-05-23 SDOH — ECONOMIC STABILITY: FOOD INSECURITY: WITHIN THE PAST 12 MONTHS, THE FOOD YOU BOUGHT JUST DIDN'T LAST AND YOU DIDN'T HAVE MONEY TO GET MORE.: NEVER TRUE

## 2022-05-23 SDOH — ECONOMIC STABILITY: FOOD INSECURITY: WITHIN THE PAST 12 MONTHS, YOU WORRIED THAT YOUR FOOD WOULD RUN OUT BEFORE YOU GOT MONEY TO BUY MORE.: NEVER TRUE

## 2022-05-23 ASSESSMENT — SOCIAL DETERMINANTS OF HEALTH (SDOH): HOW HARD IS IT FOR YOU TO PAY FOR THE VERY BASICS LIKE FOOD, HOUSING, MEDICAL CARE, AND HEATING?: NOT HARD AT ALL

## 2022-05-23 ASSESSMENT — ENCOUNTER SYMPTOMS
COUGH: 0
WHEEZING: 0
SHORTNESS OF BREATH: 0
ABDOMINAL PAIN: 0
CHEST TIGHTNESS: 0
PHOTOPHOBIA: 0
CHOKING: 0

## 2022-05-23 ASSESSMENT — PATIENT HEALTH QUESTIONNAIRE - PHQ9
SUM OF ALL RESPONSES TO PHQ QUESTIONS 1-9: 0
1. LITTLE INTEREST OR PLEASURE IN DOING THINGS: 0
2. FEELING DOWN, DEPRESSED OR HOPELESS: 0
SUM OF ALL RESPONSES TO PHQ9 QUESTIONS 1 & 2: 0

## 2022-05-23 NOTE — PATIENT INSTRUCTIONS
Nutrition Health Education:    Keep hydrated, walk 30 minutes minimum 3 times weekly as tolerated. Diet should consist of low fat, low sodium and high fiber. Nutritious foods such as fruits (if you're not diabetic), vegetables, lean meats, lean dairy, whole grains such as brown rice, quinoa, and dry beans. Nadira Piles with small amounts of heart healthy extra virgin olive oil. Be watchful of any extra salt/sugar/seasoning to your food. You should eat no more than 2 grams or 2,000 mg of salt daily. Salt will raise your BP. Avoid regular/diet sodas, caffeine, energy drinks as these are full of artificial ingredients/sweeteners, sugar, salt and chemicals that spike insulin and are harmful to your health. Sugar intake increases metabolic disfunction, type 2 diabetes, insulin resistance, addictive food behavior and obesity. Avoid all processed foods, foods from boxes, cans, microwave meals as these contain high salt, sugar or fat content and not much nutrition. Get at least 8 hrs of sleep every night and turn off all electronics at least 1 hour before bedtime as these decreases melatonin production and increases wakefulness. If your cholesterol is high, no greasy, fried, fast or fatty foods. Decrease red meat intake. No butter, lopes, lard or creams, no milk as these things clog your arteries and leads to heart attacks and death. If you smoke, smoking increases risk of lung disease, cancers, high BP, heart attack, stroke and death. Take your daily medications as prescribed and inform your family doctor if you are having any side effects or issues taking medications.     Elevated Cholesterol:   No greasy, fried, fast, fatty foods   No trans fats   Decreased red meat intake to once every 2 months   No butter, lopes nor cream cheese, cheese   No egg yolk   NO milk   Decrease your cholesterol in diet    reviewed labs from 02/2022 cmp,lipids,tsh,Hga1C    Pt does not want to take cholesterol medications ever as he doesn't want to risk getting any side effects, He is aware of the risk of heart disease    Pt under care of Dr Juan Cordero urology       HgA1C 5.1 reviewed with pt    Blood work and follow up within a month  AWV in 09/2022

## 2022-05-23 NOTE — PROGRESS NOTES
Name: James Johnston  DOS: 2022  MRN: [id number]      Subjective:  James Johnston is a 78 y.o. male being seen for   Chief Complaint   Patient presents with    Hyperlipidemia     Lipids done onm 2022       Vitals:    22 0940   BP:    Pulse: 60   Resp: 16   Temp:    SpO2: 96%     No Known Allergies  Past Medical History:   Diagnosis Date    BPH (benign prostatic hypertrophy)     with outlet symptoms    Carpal tunnel syndrome, bilateral     Cervical disc disorder     Footdrop     Left    Herpes simplex labialis     episodic    Hyperlipidemia     Hypothyroidism     Mumps     Neck pain     Neuropathy     Paresthesias     Peripheral neuropathy     Pneumonia     history of    Ulnar neuropathy of right upper extremity      Past Surgical History:   Procedure Laterality Date    CARPAL TUNNEL RELEASE Right 2014    also ulnar nerve r elbow     COLONOSCOPY  2009    INGUINAL HERNIA REPAIR Left 2020    Laparoscopic Robotic Assisted Left Inguinal Hernia Repair performed [facility] Val[facility]    OTHER SURGICAL HISTORY      IVIG INFUSIONS STARTING 2015 AND EVERY 3 MONTH SINCE    TOE SURGERY       Social History     Socioeconomic History    Marital status:      Spouse name: Not on file    Number of children: 2    Years of education: Not on file    Highest education level: Not on file   Occupational History     Employer: [employer]   Tobacco Use    Smoking status: Former Smoker     Packs/day: 1.00     Years: 25.00     Pack years: 25.00     Types: Cigarettes     Quit date: 2005     Years since quittin.4    Smokeless tobacco: Never Used    Tobacco comment: smoked for 25 years, stopped 2005. 1 pack daily   Vaping Use    Vaping Use: Never used   Substance and Sexual Activity    Alcohol use:  Yes     Alcohol/week: 7.0 standard drinks     Types: 7 Cans of beer per week     Comment: 1 daily    Drug use: No    Sexual activity: Not on file     Comment: Unmarried.  x2. 2 children. Other Topics Concern    Not on file   Social History Narrative    Not on file     Social Determinants of Health     Financial Resource Strain: Low Risk     Difficulty of Paying Living Expenses: Not hard at all   Food Insecurity: No Food Insecurity    Worried About Running Out of Food in the Last Year: Never true    [address] in the Last Year: Never true   Transportation Needs:     Lack of Transportation (Medical): Not on file    Lack of Transportation (Non-Medical):  Not on file   Physical Activity:     Days of Exercise per Week: Not on file    Minutes of Exercise per Session: Not on file   Stress:     Feeling of Stress : Not on file   Social Connections:     Frequency of Communication with Friends and Family: Not on file    Frequency of Social Gatherings with Friends and Family: Not on file    Attends [employer] Services: Not on file    Active Member of [address] [place] [address] or Organizations: Not on file    Attends Club or Organization Meetings: Not on file    Marital Status: Not on file   Intimate Partner Violence:     Fear of Current or Ex-Partner: Not on file    Emotionally Abused: Not on file    Physically Abused: Not on file    Sexually Abused: Not on file   Housing Stability:     Unable to Pay for Housing in the Last Year: Not on file    Number of [place] in the Last Year: Not on file    Unstable Housing in the Last Year: Not on file       Current Outpatient Medications   Medication Sig Dispense Refill    tamsulosin (FLOMAX) 0.4 MG capsule Take 1 capsule by mouth in the morning and at bedtime TALE 1 CAPSULE ONCE A  capsule 1    solifenacin (VESICARE) 10 MG tablet Take 1 tablet by mouth in the morning and at bedtime 120 tablet 1    PRIVIGEN 20 GM/200ML SOLN solution       tadalafil (CIALIS) 10 MG tablet Take 1 tablet by mouth as needed for Erectile Dysfunction 30 tablet 3    valACYclovir (VALTREX) 1 g tablet Take 1 tablet by mouth 2 times daily Take 1 tablet by mouth 2 times daily prn basis when flare up. 14 tablet 3    b complex vitamins capsule Take 1 capsule by mouth daily.  Ascorbic Acid (VITAMIN C) 250 MG tablet Take 500 mg by mouth daily.  Omega-3 Fatty Acids (FISH OIL) 1000 MG CAPS Take 1,000 mg by mouth daily.  Vitamin D (CHOLECALCIFEROL) 1000 UNITS CAPS capsule Take 1,000 Units by mouth daily.  vitamin B-12 (CYANOCOBALAMIN) 1000 MCG tablet Take 1,000 mcg by mouth daily. No current facility-administered medications for this visit. Objective:  Pt states he feels fine without complains just here to be established. Pt declines cholesterol medications is aware of risk of heart disease. Increase cholesterol can lead to stroke and heart attack. Review of Systems   Constitutional: Negative for fatigue and unexpected weight change. Eyes: Negative for photophobia and visual disturbance. Respiratory: Negative for cough, choking, chest tightness, shortness of breath and wheezing. Cardiovascular: Negative for chest pain, palpitations and leg swelling. Gastrointestinal: Negative for abdominal pain. Genitourinary: Negative for difficulty urinating and hematuria. Musculoskeletal: Negative for arthralgias and myalgias. Skin: Negative for rash and wound. Neurological: Positive for weakness (left foot right hand ) and numbness (tingling as well right hand left foot has foot drop unknown why had workup under care of neurology,). Negative for dizziness and headaches. Hematological: Negative for adenopathy. Does not bruise/bleed easily. Psychiatric/Behavioral: Negative for agitation, confusion, decreased concentration, self-injury, sleep disturbance and suicidal ideas. The patient is not nervous/anxious. Physical Exam  Constitutional:       General: He is not in acute distress. Appearance: Normal appearance.  He is not ill-appearing, toxic-appearing or diaphoretic. HENT:      Head: Normocephalic and atraumatic. Right Ear: Tympanic membrane, ear canal and external ear normal. There is no impacted cerumen. Left Ear: Tympanic membrane and external ear normal. There is no impacted cerumen. Nose: Nose normal. No congestion or rhinorrhea. Mouth/Throat:      Mouth: Mucous membranes are moist.      Pharynx: Oropharynx is clear. No oropharyngeal exudate or posterior oropharyngeal erythema. Eyes:      Extraocular Movements: Extraocular movements intact. Conjunctiva/sclera: Conjunctivae normal.      Pupils: Pupils are equal, round, and reactive to light. Cardiovascular:      Rate and Rhythm: Normal rate and regular rhythm. Pulses: Normal pulses. Heart sounds: Normal heart sounds. No murmur heard. Pulmonary:      Effort: Pulmonary effort is normal.      Breath sounds: Normal breath sounds. No wheezing, rhonchi or rales. Abdominal:      General: Abdomen is flat. Bowel sounds are normal. There is no distension. Palpations: Abdomen is soft. There is no mass. Tenderness: There is no abdominal tenderness. There is no guarding. Musculoskeletal:         General: Deformity (has some mm waisting chronic right forearm) present. Normal range of motion. Cervical back: Normal range of motion and neck supple. Right lower leg: No edema. Left lower leg: No edema. Lymphadenopathy:      Cervical: No cervical adenopathy. Skin:     General: Skin is warm. Capillary Refill: Capillary refill takes less than 2 seconds. Neurological:      General: No focal deficit present. Mental Status: He is alert and oriented to person, place, and time. Motor: Weakness (right upper limb, hand) present. Coordination: Coordination normal.      Gait: Gait normal.   Psychiatric:         Mood and Affect: Mood normal.         Behavior: Behavior normal.         Thought Content:  Thought content normal. Assessment:   Diagnosis Orders   1. Mixed hyperlipidemia  Comprehensive Metabolic Panel   2. Thyroid function test abnormal  TSH with Reflex   3. Hyperglycemia  Comprehensive Metabolic Panel    POCT glycosylated hemoglobin (Hb A1C)   4. Right foot drop     5. Right hand weakness     6. Ulnar neuropathy of right upper extremity           Plan:  Orders Placed This Encounter   Procedures    Comprehensive Metabolic Panel     Standing Status:   Future     Standing Expiration Date:   7/23/2022    TSH with Reflex     Standing Status:   Future     Standing Expiration Date:   7/23/2022    POCT glycosylated hemoglobin (Hb A1C)         Patient Instructions   Nutrition Health Education:    Keep hydrated, walk 30 minutes minimum 3 times weekly as tolerated. Diet should consist of low fat, low sodium and high fiber. Nutritious foods such as fruits (if you're not diabetic), vegetables, lean meats, lean dairy, whole grains such as brown rice, quinoa, and dry beans. Suyapa Michaels with small amounts of heart healthy extra virgin olive oil. Be watchful of any extra salt/sugar/seasoning to your food. You should eat no more than 2 grams or 2,000 mg of salt daily. Salt will raise your BP. Avoid regular/diet sodas, caffeine, energy drinks as these are full of artificial ingredients/sweeteners, sugar, salt and chemicals that spike insulin and are harmful to your health. Sugar intake increases metabolic disfunction, type 2 diabetes, insulin resistance, addictive food behavior and obesity. Avoid all processed foods, foods from boxes, cans, microwave meals as these contain high salt, sugar or fat content and not much nutrition. Get at least 8 hrs of sleep every night and turn off all electronics at least 1 hour before bedtime as these decreases melatonin production and increases wakefulness. If your cholesterol is high, no greasy, fried, fast or fatty foods. Decrease red meat intake.  No butter, lopes, lard or creams, no milk as these things clog your arteries and leads to heart attacks and death. If you smoke, smoking increases risk of lung disease, cancers, high BP, heart attack, stroke and death. Take your daily medications as prescribed and inform your family doctor if you are having any side effects or issues taking medications. Elevated Cholesterol:   No greasy, fried, fast, fatty foods   No trans fats   Decreased red meat intake to once every 2 months   No butter, lopes nor cream cheese, cheese   No egg yolk   NO milk   Decrease your cholesterol in diet    reviewed labs from 02/2022 cmp,lipids,tsh,Hga1C    Pt does not want to take cholesterol medications ever as he doesn't want to risk getting any side effects, He is aware of the risk of heart disease    Pt under care of Dr Bailey Cruz urology       HgA1C 5.1 reviewed with pt    Blood work and follow up within a month  AWV in 09/2022       Return in about 1 month (around 6/23/2022) for REVIEW LABS, and AWV in 09/2022.      Jason Carvalho, DO

## 2022-05-26 ENCOUNTER — TELEPHONE (OUTPATIENT)
Dept: FAMILY MEDICINE CLINIC | Age: 67
End: 2022-05-26

## 2022-05-26 NOTE — TELEPHONE ENCOUNTER
I spoke to patient re: followup from ER. He says he is to f/u with Urology this week. I offered to schedule appt here but he says he will be contacting his Urologist.  He is doing well.

## 2022-05-27 ENCOUNTER — TELEPHONE (OUTPATIENT)
Dept: UROLOGY | Age: 67
End: 2022-05-27

## 2022-05-27 NOTE — TELEPHONE ENCOUNTER
Patient called and stated he had an episode Wednesday with urinary retention and also stopped taking the solifenacin 10mg on Wednesday. Patient states he is doing fine without taking it and would like to know if this medication caused the urinary retention and also would like to know if he needs to start this medication back up? Please give him a call back at [phone].

## 2022-07-05 LAB
ALBUMIN/GLOBULIN RATIO: 1.5 G/DL
ALBUMIN: 4.2 G/DL (ref 3.5–5)
ALP BLD-CCNC: 65 UNITS/L (ref 38–126)
ALT SERPL-CCNC: 28 UNITS/L (ref 4–50)
ANION GAP SERPL CALCULATED.3IONS-SCNC: 7.8 MMOL/L
AST SERPL-CCNC: 35 UNITS/L (ref 17–59)
BILIRUB SERPL-MCNC: 1 MG/DL (ref 0.2–1.3)
BUN BLDV-MCNC: 20 MG/DL (ref 9–20)
CALCIUM SERPL-MCNC: 9.1 MG/DL (ref 8.4–10.2)
CHLORIDE BLD-SCNC: 108 MMOL/L (ref 98–120)
CO2: 25 MMOL/L (ref 22–31)
CREAT SERPL-MCNC: 1.1 MG/DL (ref 0.7–1.3)
GFR CALCULATED: > 60
GLOBULIN: 2.7 G/DL
GLUCOSE: 107 MG/DL (ref 75–110)
POTASSIUM SERPL-SCNC: 4.2 MMOL/L (ref 3.6–5)
SODIUM BLD-SCNC: 140 MMOL/L (ref 135–145)
TOTAL PROTEIN, SERUM: 6.9 G/DL (ref 6.3–8.2)
TSH REFLEX FT4: 1.96 MIU/ML (ref 0.49–4.67)

## 2022-07-07 ENCOUNTER — OFFICE VISIT (OUTPATIENT)
Dept: FAMILY MEDICINE CLINIC | Age: 67
End: 2022-07-07
Payer: MEDICARE

## 2022-07-07 VITALS
BODY MASS INDEX: 27.6 KG/M2 | HEIGHT: 72 IN | WEIGHT: 203.8 LBS | OXYGEN SATURATION: 97 % | TEMPERATURE: 96.9 F | DIASTOLIC BLOOD PRESSURE: 60 MMHG | HEART RATE: 66 BPM | RESPIRATION RATE: 16 BRPM | SYSTOLIC BLOOD PRESSURE: 104 MMHG

## 2022-07-07 DIAGNOSIS — E78.2 MIXED HYPERLIPIDEMIA: Primary | ICD-10-CM

## 2022-07-07 DIAGNOSIS — R73.9 HYPERGLYCEMIA: ICD-10-CM

## 2022-07-07 PROCEDURE — 1123F ACP DISCUSS/DSCN MKR DOCD: CPT | Performed by: FAMILY MEDICINE

## 2022-07-07 PROCEDURE — 99214 OFFICE O/P EST MOD 30 MIN: CPT | Performed by: FAMILY MEDICINE

## 2022-07-07 PROCEDURE — 99212 OFFICE O/P EST SF 10 MIN: CPT | Performed by: FAMILY MEDICINE

## 2022-07-07 SDOH — ECONOMIC STABILITY: FOOD INSECURITY: WITHIN THE PAST 12 MONTHS, YOU WORRIED THAT YOUR FOOD WOULD RUN OUT BEFORE YOU GOT MONEY TO BUY MORE.: NEVER TRUE

## 2022-07-07 SDOH — ECONOMIC STABILITY: FOOD INSECURITY: WITHIN THE PAST 12 MONTHS, THE FOOD YOU BOUGHT JUST DIDN'T LAST AND YOU DIDN'T HAVE MONEY TO GET MORE.: NEVER TRUE

## 2022-07-07 ASSESSMENT — ENCOUNTER SYMPTOMS
CHOKING: 0
COUGH: 0
PHOTOPHOBIA: 0
ABDOMINAL PAIN: 0
CHEST TIGHTNESS: 0
WHEEZING: 0
SHORTNESS OF BREATH: 0

## 2022-07-07 ASSESSMENT — PATIENT HEALTH QUESTIONNAIRE - PHQ9
SUM OF ALL RESPONSES TO PHQ QUESTIONS 1-9: 0
SUM OF ALL RESPONSES TO PHQ QUESTIONS 1-9: 0
1. LITTLE INTEREST OR PLEASURE IN DOING THINGS: 0
SUM OF ALL RESPONSES TO PHQ QUESTIONS 1-9: 0
SUM OF ALL RESPONSES TO PHQ QUESTIONS 1-9: 0
SUM OF ALL RESPONSES TO PHQ9 QUESTIONS 1 & 2: 0
2. FEELING DOWN, DEPRESSED OR HOPELESS: 0

## 2022-07-07 ASSESSMENT — SOCIAL DETERMINANTS OF HEALTH (SDOH): HOW HARD IS IT FOR YOU TO PAY FOR THE VERY BASICS LIKE FOOD, HOUSING, MEDICAL CARE, AND HEATING?: NOT HARD AT ALL

## 2022-07-07 NOTE — PROGRESS NOTES
Name: Bryan Concepcion  DOS: 2022  MRN: [id number]      Subjective:  Bryan Concepcion is a 75 y.o. male being seen for   Chief Complaint   Patient presents with    Hypertension     1 month f/u- review labs    Other     Abnormal Thyroid testing. Last TSH 2022= 1.96       Vitals:    22 0832   BP:    Pulse: 66   Resp: 16   Temp:    SpO2: 97%     No Known Allergies  Past Medical History:   Diagnosis Date    BPH (benign prostatic hypertrophy)     with outlet symptoms    Carpal tunnel syndrome, bilateral     Cervical disc disorder     Footdrop     Left    Herpes simplex labialis     episodic    Hyperlipidemia     Hypothyroidism     Mumps     Neck pain     Neuropathy     Paresthesias     Peripheral neuropathy     Pneumonia     history of    Ulnar neuropathy of right upper extremity      Past Surgical History:   Procedure Laterality Date    CARPAL TUNNEL RELEASE Right 2014    also ulnar nerve r elbow     COLONOSCOPY  2009    INGUINAL HERNIA REPAIR Left 2020    Laparoscopic Robotic Assisted Left Inguinal Hernia Repair performed by Ginger Ferris DO at . [address]      peridontal    OTHER SURGICAL HISTORY      IVIG INFUSIONS STARTING 2015 AND EVERY 3 MONTH SINCE    TOE SURGERY       Social History     Socioeconomic History    Marital status:      Spouse name: Not on file    Number of children: 2    Years of education: Not on file    Highest education level: Not on file   Occupational History     Employer: [email or web address]   Tobacco Use    Smoking status: Former Smoker     Packs/day: 1.00     Years: 25.00     Pack years: 25.00     Types: Cigarettes     Quit date: 2005     Years since quittin.5    Smokeless tobacco: Never Used    Tobacco comment: smoked for 25 years, stopped 2005. 1 pack daily   Vaping Use    Vaping Use: Never used   Substance and Sexual Activity    Alcohol use:  Yes     Alcohol/week: 7.0 standard drinks Types: 7 Cans of beer per week     Comment: 1 daily    Drug use: No    Sexual activity: Not on file     Comment: Unmarried.  x2. 2 children. Other Topics Concern    Not on file   Social History Narrative    Not on file     Social Determinants of Health     Financial Resource Strain: Low Risk     Difficulty of Paying Living Expenses: Not hard at all   Food Insecurity: No Food Insecurity    Worried About Running Out of Food in the Last Year: Never true    [address] in the Last Year: Never true   Transportation Needs:     Lack of Transportation (Medical): Not on file    Lack of Transportation (Non-Medical):  Not on file   Physical Activity:     Days of Exercise per Week: Not on file    Minutes of Exercise per Session: Not on file   Stress:     Feeling of Stress : Not on file   Social Connections:     Frequency of Communication with Friends and Family: Not on file    Frequency of Social Gatherings with Friends and Family: Not on file    Attends Druze Services: Not on file    Active Member of [address] [place] [address] or Organizations: Not on file    Attends Club or Organization Meetings: Not on file    Marital Status: Not on file   Intimate Partner Violence:     Fear of Current or Ex-Partner: Not on file    Emotionally Abused: Not on file    Physically Abused: Not on file    Sexually Abused: Not on file   Housing Stability:     Unable to Pay for Housing in the Last Year: Not on file    Number of [place] in the Last Year: Not on file    Unstable Housing in the Last Year: Not on file       Current Outpatient Medications   Medication Sig Dispense Refill    tamsulosin (FLOMAX) 0.4 MG capsule Take 1 capsule by mouth in the morning and at bedtime TALE 1 CAPSULE ONCE A  capsule 1    PRIVIGEN 20 GM/200ML SOLN solution       tadalafil (CIALIS) 10 MG tablet Take 1 tablet by mouth as needed for Erectile Dysfunction 30 tablet 3    valACYclovir (VALTREX) 1 g tablet Take 1 tablet by mouth 2 normal. There is no impacted cerumen. Left Ear: Tympanic membrane, ear canal and external ear normal. There is no impacted cerumen. Nose: Nose normal. No congestion or rhinorrhea. Mouth/Throat:      Mouth: Mucous membranes are moist.      Pharynx: Oropharynx is clear. No oropharyngeal exudate or posterior oropharyngeal erythema. Eyes:      Extraocular Movements: Extraocular movements intact. Conjunctiva/sclera: Conjunctivae normal.      Pupils: Pupils are equal, round, and reactive to light. Cardiovascular:      Rate and Rhythm: Normal rate and regular rhythm. Pulses: Normal pulses. Heart sounds: Normal heart sounds. No murmur heard. Pulmonary:      Effort: Pulmonary effort is normal.      Breath sounds: Normal breath sounds. No wheezing, rhonchi or rales. Abdominal:      General: Abdomen is flat. Bowel sounds are normal. There is no distension. Palpations: Abdomen is soft. There is no mass. Tenderness: There is no abdominal tenderness. There is no right CVA tenderness, left CVA tenderness or guarding. Musculoskeletal:         General: Normal range of motion. Cervical back: Normal range of motion and neck supple. Right lower leg: No edema. Left lower leg: No edema. Lymphadenopathy:      Cervical: No cervical adenopathy. Skin:     General: Skin is warm. Capillary Refill: Capillary refill takes less than 2 seconds. Neurological:      General: No focal deficit present. Mental Status: He is alert and oriented to person, place, and time. Motor: No weakness. Coordination: Coordination normal.      Gait: Gait normal.   Psychiatric:         Mood and Affect: Mood normal.         Behavior: Behavior normal.         Thought Content: Thought content normal.          Assessment:   Diagnosis Orders   1. Mixed hyperlipidemia     2. Hyperglycemia           Plan:  No orders of the defined types were placed in this encounter.         Patient Instructions   Nutrition Health Education:    Keep hydrated, walk 30 minutes minimum 3 times weekly as tolerated. Diet should consist of low fat, low sodium and high fiber. Nutritious foods such as fruits (if you're not diabetic), vegetables, lean meats, lean dairy, whole grains such as brown rice, quinoa, and dry beans. Logan Slicker with small amounts of heart healthy extra virgin olive oil. Be watchful of any extra salt/sugar/seasoning to your food. You should eat no more than 2 grams or 2,000 mg of salt daily. Salt will raise your BP. Avoid regular/diet sodas, caffeine, energy drinks as these are full of artificial ingredients/sweeteners, sugar, salt and chemicals that spike insulin and are harmful to your health. Sugar intake increases metabolic disfunction, type 2 diabetes, insulin resistance, addictive food behavior and obesity. Avoid all processed foods, foods from boxes, cans, microwave meals as these contain high salt, sugar or fat content and not much nutrition. Get at least 8 hrs of sleep every night and turn off all electronics at least 1 hour before bedtime as these decreases melatonin production and increases wakefulness. If your cholesterol is high, no greasy, fried, fast or fatty foods. Decrease red meat intake. No butter, lopes, lard or creams, no milk as these things clog your arteries and leads to heart attacks and death. If you smoke, smoking increases risk of lung disease, cancers, high BP, heart attack, stroke and death. Take your daily medications as prescribed and inform your family doctor if you are having any side effects or issues taking medications.     Elevated Cholesterol:   No greasy, fried, fast, fatty foods   No trans fats   Decreased red meat intake to once every 2 months   No butter, lopes nor cream cheese, cheese   No egg yolk   NO milk   Decrease your cholesterol in diet    Decrease sugar in your diet    Reviewed recent labs with pt  tsh and cmp  Also reviewed labs with pt  from

## 2022-07-07 NOTE — PATIENT INSTRUCTIONS
Nutrition Health Education:    Keep hydrated, walk 30 minutes minimum 3 times weekly as tolerated. Diet should consist of low fat, low sodium and high fiber. Nutritious foods such as fruits (if you're not diabetic), vegetables, lean meats, lean dairy, whole grains such as brown rice, quinoa, and dry beans. Gwenevere Cholo with small amounts of heart healthy extra virgin olive oil. Be watchful of any extra salt/sugar/seasoning to your food. You should eat no more than 2 grams or 2,000 mg of salt daily. Salt will raise your BP. Avoid regular/diet sodas, caffeine, energy drinks as these are full of artificial ingredients/sweeteners, sugar, salt and chemicals that spike insulin and are harmful to your health. Sugar intake increases metabolic disfunction, type 2 diabetes, insulin resistance, addictive food behavior and obesity. Avoid all processed foods, foods from boxes, cans, microwave meals as these contain high salt, sugar or fat content and not much nutrition. Get at least 8 hrs of sleep every night and turn off all electronics at least 1 hour before bedtime as these decreases melatonin production and increases wakefulness. If your cholesterol is high, no greasy, fried, fast or fatty foods. Decrease red meat intake. No butter, lopes, lard or creams, no milk as these things clog your arteries and leads to heart attacks and death. If you smoke, smoking increases risk of lung disease, cancers, high BP, heart attack, stroke and death. Take your daily medications as prescribed and inform your family doctor if you are having any side effects or issues taking medications.     Elevated Cholesterol:   No greasy, fried, fast, fatty foods   No trans fats   Decreased red meat intake to once every 2 months   No butter, lopes nor cream cheese, cheese   No egg yolk   NO milk   Decrease your cholesterol in diet    Decrease sugar in your diet    Reviewed recent labs with pt  tsh and cmp  Also reviewed labs with pt  from 02/2022 lipids    Pt does not want to start cholesterol medications as he just does not want any more medications although he is aware of risk of heart disease which can lead to a heart attack but he is willing to do a calcium heart score.      I wrote a prescription for a calcium heart score call [facility] radiology dept    At this time there is no reason to order blood work    Pt has an AWV scheduled for September at that time I will give him blood work to do for 02/2023

## 2022-07-13 ENCOUNTER — OFFICE VISIT (OUTPATIENT)
Dept: UROLOGY | Age: 67
End: 2022-07-13
Payer: MEDICARE

## 2022-07-13 VITALS
HEART RATE: 56 BPM | BODY MASS INDEX: 27.77 KG/M2 | DIASTOLIC BLOOD PRESSURE: 80 MMHG | SYSTOLIC BLOOD PRESSURE: 120 MMHG | RESPIRATION RATE: 16 BRPM | OXYGEN SATURATION: 94 % | WEIGHT: 205 LBS | HEIGHT: 72 IN

## 2022-07-13 DIAGNOSIS — R35.0 URINARY FREQUENCY: ICD-10-CM

## 2022-07-13 DIAGNOSIS — R33.9 URINARY RETENTION: ICD-10-CM

## 2022-07-13 DIAGNOSIS — R39.9 LOWER URINARY TRACT SYMPTOMS (LUTS): ICD-10-CM

## 2022-07-13 DIAGNOSIS — R97.20 ELEVATED PSA: ICD-10-CM

## 2022-07-13 DIAGNOSIS — N13.8 BPH WITH OBSTRUCTION/LOWER URINARY TRACT SYMPTOMS: Primary | ICD-10-CM

## 2022-07-13 DIAGNOSIS — R33.9 INCOMPLETE BLADDER EMPTYING: ICD-10-CM

## 2022-07-13 DIAGNOSIS — N40.1 BPH WITH OBSTRUCTION/LOWER URINARY TRACT SYMPTOMS: Primary | ICD-10-CM

## 2022-07-13 DIAGNOSIS — N52.01 ERECTILE DYSFUNCTION DUE TO ARTERIAL INSUFFICIENCY: ICD-10-CM

## 2022-07-13 PROCEDURE — 99214 OFFICE O/P EST MOD 30 MIN: CPT | Performed by: UROLOGY

## 2022-07-13 PROCEDURE — 1123F ACP DISCUSS/DSCN MKR DOCD: CPT | Performed by: UROLOGY

## 2022-07-13 PROCEDURE — 99212 OFFICE O/P EST SF 10 MIN: CPT | Performed by: UROLOGY

## 2022-07-13 NOTE — PROGRESS NOTES
Date    BPH (benign prostatic hypertrophy)     with outlet symptoms    Carpal tunnel syndrome, bilateral     Cervical disc disorder     Footdrop     Left    Herpes simplex labialis     episodic    Hyperlipidemia     Hypothyroidism     Mumps     Neck pain     Neuropathy     Paresthesias     Peripheral neuropathy     Pneumonia     history of    Ulnar neuropathy of right upper extremity      Past Surgical History:   Procedure Laterality Date    CARPAL TUNNEL RELEASE Right feb 2014    also ulnar nerve r elbow     COLONOSCOPY  2009    INGUINAL HERNIA REPAIR Left 9/14/2020    Laparoscopic Robotic Assisted Left Inguinal Hernia Repair performed by Aislinn Quan DO at . [address]      peridontal    OTHER SURGICAL HISTORY      IVIG INFUSIONS STARTING NOVEMBER OF 2015 AND EVERY 3 MONTH SINCE    TOE SURGERY       Family History   Problem Relation Age of Onset    Heart Disease Mother     Cancer Mother         lung    Cancer Father         lung    Glaucoma Neg Hx      Outpatient Medications Marked as Taking for the 7/13/22 encounter (Office Visit) with Avani Yusuf MD   Medication Sig Dispense Refill    tamsulosin (FLOMAX) 0.4 MG capsule Take 1 capsule by mouth in the morning and at bedtime TALE 1 CAPSULE ONCE A  capsule 1    PRIVIGEN 20 GM/200ML SOLN solution       tadalafil (CIALIS) 10 MG tablet Take 1 tablet by mouth as needed for Erectile Dysfunction 30 tablet 3    valACYclovir (VALTREX) 1 g tablet Take 1 tablet by mouth 2 times daily Take 1 tablet by mouth 2 times daily prn basis when flare up. 14 tablet 3    b complex vitamins capsule Take 1 capsule by mouth daily.  Ascorbic Acid (VITAMIN C) 250 MG tablet Take 500 mg by mouth daily.  Omega-3 Fatty Acids (FISH OIL) 1000 MG CAPS Take 1,000 mg by mouth daily.  Vitamin D (CHOLECALCIFEROL) 1000 UNITS CAPS capsule Take 1,000 Units by mouth daily.       vitamin B-12 (CYANOCOBALAMIN) 1000 MCG tablet Take 1,000 mcg by mouth daily. Patient has no known allergies. Social History     Tobacco Use   Smoking Status Former Smoker    Packs/day: 1.00    Years: 25.00    Pack years: 25.00    Types: Cigarettes    Start date: 1974   Natalee Millard Quit date: 2005    Years since quittin.5   Smokeless Tobacco Never Used   Tobacco Comment    smoked for 25 years, stopped 2005. 1 pack daily       Social History     Substance and Sexual Activity   Alcohol Use Yes    Alcohol/week: 7.0 standard drinks    Types: 7 Cans of beer per week    Comment: 1 daily       REVIEW OF SYSTEMS:  Constitutional: negative  Eyes: negative  Respiratory: negative  Cardiovascular: negative  Gastrointestinal: negative  Musculoskeletal: negative  Genitourinary: negative except for what is in HPI  Skin: negative   Neurological: negative  Hematological/Lymphatic: negative  Psychological: negative    Physical Exam:    This a 75 y.o. male   Vitals:    22 1045   BP: 120/80   Pulse: 56   Resp: 16   SpO2: 94%     Constitutional: Patient in no acute distress; Neuro: alert and oriented to person place and time. Assessment and Plan      1. BPH with obstruction/lower urinary tract symptoms    2. Erectile dysfunction due to arterial insufficiency    3. Urinary frequency    4. Lower urinary tract symptoms (LUTS)    5. Urinary retention    6. Incomplete bladder emptying    7. Elevated PSA           Plan:         BPH: stream is slightly stronger with Flomax to BID. ED- on cialis PRN, refill Rx sent. Urinary frequency: Ditropan did not work. Myrbetriq 50 mg x 2 months, 30% improvement. Symptoms still bothersome. Still frequency Q1-1.5 hours.   Trial Vesicare 10 mg BID- resulted in retention after 2 weeks of taking  Catheter resolved this; has been removed since then    Urinary retention: previous Post void residual by bladder scanner 31 cc  Constipation:  MiraLAX PRN  Elevated PSA: returned to baseline, monitor q 6-12 months      Patient is not content with current LUTS, and wants to ultimately be off medications  Will arrange for cystoscopy  Discussed he may need outlet surgery  Check PSA                   Simone Schuster MD  [facility]

## 2022-08-01 ENCOUNTER — HOSPITAL ENCOUNTER (OUTPATIENT)
Dept: LAB | Age: 67
Discharge: HOME | End: 2022-08-01
Payer: MEDICARE

## 2022-08-01 DIAGNOSIS — R97.20 ELEVATED PSA: ICD-10-CM

## 2022-08-01 DIAGNOSIS — N13.8 BPH WITH OBSTRUCTION/LOWER URINARY TRACT SYMPTOMS: ICD-10-CM

## 2022-08-01 DIAGNOSIS — N40.1 BPH WITH OBSTRUCTION/LOWER URINARY TRACT SYMPTOMS: ICD-10-CM

## 2022-08-01 LAB — PROSTATE SPECIFIC ANTIGEN: 4.75 NG/ML

## 2022-08-01 PROCEDURE — 84153 ASSAY OF PSA TOTAL: CPT

## 2022-08-01 PROCEDURE — 36415 COLL VENOUS BLD VENIPUNCTURE: CPT

## 2022-08-17 ENCOUNTER — PROCEDURE VISIT (OUTPATIENT)
Dept: UROLOGY | Age: 67
End: 2022-08-17
Payer: MEDICARE

## 2022-08-17 VITALS
OXYGEN SATURATION: 97 % | BODY MASS INDEX: 27.8 KG/M2 | SYSTOLIC BLOOD PRESSURE: 118 MMHG | HEART RATE: 57 BPM | HEIGHT: 72 IN | DIASTOLIC BLOOD PRESSURE: 64 MMHG

## 2022-08-17 DIAGNOSIS — N52.01 ERECTILE DYSFUNCTION DUE TO ARTERIAL INSUFFICIENCY: ICD-10-CM

## 2022-08-17 DIAGNOSIS — R33.9 INCOMPLETE BLADDER EMPTYING: ICD-10-CM

## 2022-08-17 DIAGNOSIS — R35.0 URINARY FREQUENCY: ICD-10-CM

## 2022-08-17 DIAGNOSIS — N13.8 BPH WITH OBSTRUCTION/LOWER URINARY TRACT SYMPTOMS: Primary | ICD-10-CM

## 2022-08-17 DIAGNOSIS — R39.9 LOWER URINARY TRACT SYMPTOMS (LUTS): ICD-10-CM

## 2022-08-17 DIAGNOSIS — N40.1 BPH WITH OBSTRUCTION/LOWER URINARY TRACT SYMPTOMS: Primary | ICD-10-CM

## 2022-08-17 DIAGNOSIS — R97.20 ELEVATED PSA: ICD-10-CM

## 2022-08-17 DIAGNOSIS — R33.9 URINARY RETENTION: ICD-10-CM

## 2022-08-17 PROCEDURE — 52000 CYSTOURETHROSCOPY: CPT | Performed by: UROLOGY

## 2022-08-17 PROCEDURE — 51798 US URINE CAPACITY MEASURE: CPT | Performed by: UROLOGY

## 2022-08-17 PROCEDURE — 1123F ACP DISCUSS/DSCN MKR DOCD: CPT | Performed by: UROLOGY

## 2022-08-17 PROCEDURE — 99214 OFFICE O/P EST MOD 30 MIN: CPT | Performed by: UROLOGY

## 2022-08-17 NOTE — PROGRESS NOTES
Sabiha Fowler MD   Urology Clinic follow up      Patient:  Julienne Andre  YOB: 1955  Date: 8/17/2022    HISTORY OF PRESENT ILLNESS:   The patient is a 75 y.o. male who presents today for evaluation of the following problem(s): BPH, LUTS, urinary retention  Overall the problem(s) : unchanged, persisitent  Associated Symptoms: No dysuria, gross hematuria. Pain Severity: Pain Score:   0 - No pain    Summary of old records: prev seen ft [facility] urologist. Was in ER recently 6/9/2018 and catheter placed and then removed. No hx of stroke, PD, SCI, DM. No  surgeries  4/2021, went out drinking, had a lot to drink and went into retention and went to ER and had walton placed, unknown exact volume but was \"a lot\". Walton has since then been removed. Voiding is back to baseline. (Patient's old records, notes and chart reviewed and summarized above.)    ED: taking 10 mg cialis PRN doing well working well for him  September 23, 2020 Post op urinary retention  No change in LUTS. Frequency   BPH: taking flomax. +Fhx of prostate cancer  No UTIs or hematuria. Denies constipation    Trial Vesicare 10 mg BID- resulted in retention after 2 weeks of taking  Catheter resolved this; has been removed since then    PSA 2/2022 3.2   5/26/2021 3.78       Last several PSA's:  Lab Results   Component Value Date    PSA 4.75 (H) 08/01/2022    PSA 3.2 02/16/2022    PSA 4.71 (H) 01/10/2022       Last total testosterone:  No results found for: TESTOSTERONE    Urinalysis today:  No results found for this visit on 08/17/22.       Last BUN and creatinine:  Lab Results   Component Value Date    BUN 20 07/05/2022     Lab Results   Component Value Date    CREATININE 1.1 07/05/2022       Imaging Reviewed during this Office Visit:   (results were independently reviewed by physician and radiology report verified)    PAST MEDICAL, FAMILY AND SOCIAL HISTORY:  Past Medical History:   Diagnosis Date    BPH (benign prostatic hypertrophy)     with outlet symptoms    Carpal tunnel syndrome, bilateral     Cervical disc disorder     Footdrop     Left    Herpes simplex labialis     episodic    Hyperlipidemia     Hypothyroidism     Mumps     Neck pain     Neuropathy     Paresthesias     Peripheral neuropathy     Pneumonia     history of    Ulnar neuropathy of right upper extremity      Past Surgical History:   Procedure Laterality Date    CARPAL TUNNEL RELEASE Right feb 2014    also ulnar nerve r elbow     COLONOSCOPY  2009    INGUINAL HERNIA REPAIR Left 9/14/2020    Laparoscopic Robotic Assisted Left Inguinal Hernia Repair performed by Cheryl Oconnor DO at [facility]      peridontal    OTHER SURGICAL HISTORY      IVIG INFUSIONS STARTING NOVEMBER OF 2015 AND EVERY 3 MONTH SINCE    TOE SURGERY       Family History   Problem Relation Age of Onset    Heart Disease Mother     Cancer Mother         lung    Cancer Father         lung    Glaucoma Neg Hx      Outpatient Medications Marked as Taking for the 8/17/22 encounter (Procedure visit) with Waylon Branham MD   Medication Sig Dispense Refill    tamsulosin (FLOMAX) 0.4 MG capsule Take 1 capsule by mouth in the morning and at bedtime TALE 1 CAPSULE ONCE A  capsule 1    PRIVIGEN 20 GM/200ML SOLN solution       tadalafil (CIALIS) 10 MG tablet Take 1 tablet by mouth as needed for Erectile Dysfunction 30 tablet 3    valACYclovir (VALTREX) 1 g tablet Take 1 tablet by mouth 2 times daily Take 1 tablet by mouth 2 times daily prn basis when flare up. 14 tablet 3    b complex vitamins capsule Take 1 capsule by mouth daily. Ascorbic Acid (VITAMIN C) 250 MG tablet Take 500 mg by mouth daily. Omega-3 Fatty Acids (FISH OIL) 1000 MG CAPS Take 1,000 mg by mouth daily. Vitamin D (CHOLECALCIFEROL) 1000 UNITS CAPS capsule Take 1,000 Units by mouth daily. vitamin B-12 (CYANOCOBALAMIN) 1000 MCG tablet Take 1,000 mcg by mouth daily.          Patient has no known allergies. Social History     Tobacco Use   Smoking Status Former    Packs/day: 1.00    Years: 25.00    Pack years: 25.00    Types: Cigarettes    Start date: 1974    Quit date: 2005    Years since quittin.6   Smokeless Tobacco Never   Tobacco Comments    smoked for 25 years, stopped 2005. 1 pack daily       Social History     Substance and Sexual Activity   Alcohol Use Yes    Alcohol/week: 7.0 standard drinks    Types: 7 Cans of beer per week    Comment: 1 daily       REVIEW OF SYSTEMS:  Constitutional: negative  Eyes: negative  Respiratory: negative  Cardiovascular: negative  Gastrointestinal: negative  Musculoskeletal: negative  Genitourinary: negative except for what is in HPI  Skin: negative   Neurological: negative  Hematological/Lymphatic: negative  Psychological: negative    Physical Exam:    This a 75 y.o. male   Vitals:    22 1134   BP: 118/64   Pulse: 57   SpO2: 97%     Constitutional: Patient in no acute distress; Neuro: alert and oriented to person place and time. Cystoscopy Operative Note  Surgeon: Heaven Freire M.D, MD   Anesthesia: Urethral 2%  Indications: irritative void symptoms  Position: supine  Findings:   The patient was prepped and draped in the usual sterile fashion. The flexible cystoscope was advanced through the urethra and into the bladder. The bladder was thoroughly inspected and the following was noted:      Urethra: No abnormalities of the urethra are noted. Prostate: complete obstruction by lateral  lobe of prostate. Bladder: No tumors or CIS noted. No bladder diverticulum. Mild / Moderate trabeculation noted. Ureters:  Orifices with normal configuration and location. The cystoscope was removed. The patient tolerated the procedure well. Assessment and Plan      1. BPH with obstruction/lower urinary tract symptoms    2. Erectile dysfunction due to arterial insufficiency    3. Urinary retention    4. Urinary frequency    5.  Lower urinary tract symptoms (LUTS)    6. Incomplete bladder emptying    7. Elevated PSA           Plan:         BPH: stream is slightly stronger with Flomax to BID. ED- on cialis PRN, refill Rx sent. Urinary frequency: Ditropan did not work. Myrbetriq 50 mg x 2 months, 30% improvement. Symptoms still bothersome. Still frequency Q1-1.5 hours.   Trial Vesicare 10 mg BID- resulted in retention after 2 weeks of taking  Catheter resolved this; has been removed since then    Urinary retention: previous Post void residual by bladder scanner 31 cc  Constipation:  MiraLAX PRN  Elevated PSA: PSA slightly up, same as it was back in 1/2022; will repeat with free PSA      Cystoscopy showed obstruction  Discussed greenlight; handout provided  Check PSA and free PSA  Fu to review and discuss Bennie Kinney MD  [facility]

## 2022-08-30 ENCOUNTER — TELEPHONE (OUTPATIENT)
Dept: UROLOGY | Age: 67
End: 2022-08-30

## 2022-08-30 DIAGNOSIS — Z01.818 PRE-OP TESTING: Primary | ICD-10-CM

## 2022-08-30 NOTE — TELEPHONE ENCOUNTER
[facility] SPECIALTY [facility]    Pre-Operative Evaluation/Consultation    Name:  Ho Chavarria                                         Age:  75 y.o. MRN:  [id number]       :  1955   Date:  2022         Sex: male    There were no encounter diagnoses.     Surgeon:  Dr. Wodoy Trevino  Procedure (Planned):  Cystoscopy with greenlight pvp of prostate  Date Scheduled surgery: 10/5/22    Attending : No att. providers found    Primary Physician: Lakeisha Garg  Cardiologist: None    Type of Anesthesia Requested: General    Patient Medical history:  No Known Allergies  Patient Active Problem List   Diagnosis    Footdrop    Hyperlipidemia    Ulnar neuropathy of right upper extremity    Cervical disc disorder    ED (erectile dysfunction)    Herpes simplex labialis    Benign prostatic hyperplasia with lower urinary tract symptoms    Right brachial plexitis    CIDP (chronic inflammatory demyelinating polyneuropathy) ([facility] [address]    Urinary retention    Benign localized prostatic hyperplasia with lower urinary tract symptoms (LUTS)     Past Medical History:   Diagnosis Date    BPH (benign prostatic hypertrophy)     with outlet symptoms    Carpal tunnel syndrome, bilateral     Cervical disc disorder     Footdrop     Left    Herpes simplex labialis     episodic    Hyperlipidemia     Hypothyroidism     Mumps     Neck pain     Neuropathy     Paresthesias     Peripheral neuropathy     Pneumonia     history of    Ulnar neuropathy of right upper extremity      Past Surgical History:   Procedure Laterality Date    CARPAL TUNNEL RELEASE Right 2014    also ulnar nerve r elbow     COLONOSCOPY  2009    INGUINAL HERNIA REPAIR Left 2020    Laparoscopic Robotic Assisted Left Inguinal Hernia Repair performed by Chun Watt DO at [facility]      peridontal    OTHER SURGICAL HISTORY      IVIG INFUSIONS STARTING 2015 AND EVERY 3 MONTH SINCE    TOE SURGERY       Social History     Tobacco Use    Smoking status: Former     Packs/day: 1.00     Years: 25.00     Pack years: 25.00     Types: Cigarettes     Start date: 1974     Quit date: 2005     Years since quittin.6    Smokeless tobacco: Never    Tobacco comments:     smoked for 25 years, stopped 2005. 1 pack daily   Vaping Use    Vaping Use: Never used   Substance Use Topics    Alcohol use: Yes     Alcohol/week: 7.0 standard drinks     Types: 7 Cans of beer per week     Comment: 1 daily    Drug use: No     Medications:  Current Outpatient Medications   Medication Sig Dispense Refill    tamsulosin (FLOMAX) 0.4 MG capsule Take 1 capsule by mouth in the morning and at bedtime TALE 1 CAPSULE ONCE A  capsule 1    PRIVIGEN 20 GM/200ML SOLN solution       tadalafil (CIALIS) 10 MG tablet Take 1 tablet by mouth as needed for Erectile Dysfunction 30 tablet 3    valACYclovir (VALTREX) 1 g tablet Take 1 tablet by mouth 2 times daily Take 1 tablet by mouth 2 times daily prn basis when flare up. 14 tablet 3    b complex vitamins capsule Take 1 capsule by mouth daily. Ascorbic Acid (VITAMIN C) 250 MG tablet Take 500 mg by mouth daily. Omega-3 Fatty Acids (FISH OIL) 1000 MG CAPS Take 1,000 mg by mouth daily. Vitamin D (CHOLECALCIFEROL) 1000 UNITS CAPS capsule Take 1,000 Units by mouth daily. vitamin B-12 (CYANOCOBALAMIN) 1000 MCG tablet Take 1,000 mcg by mouth daily. No current facility-administered medications for this visit. Scheduled Meds:  Continuous Infusions:  PRN Meds:. Prior to Admission medications    Medication Sig Start Date End Date Taking?  Authorizing Provider   tamsulosin (FLOMAX) 0.4 MG capsule Take 1 capsule by mouth in the morning and at bedtime TALE 1 CAPSULE ONCE A DAY 22   Lauren Birch MD   PRIVIGEN 20 GM/200ML SOLN solution  21   Historical Provider, MD   tadalafil (CIALIS) 10 MG tablet Take 1 tablet by mouth as needed for Erectile Dysfunction 21   Shonna Ruano MD   valACYclovir (VALTREX) 1 g tablet Take 1 tablet by mouth 2 times daily Take 1 tablet by mouth 2 times daily prn basis when flare up. 8/25/21   Betsy Wilkerson MD   b complex vitamins capsule Take 1 capsule by mouth daily. Historical Provider, MD   Ascorbic Acid (VITAMIN C) 250 MG tablet Take 500 mg by mouth daily. Historical Provider, MD   Omega-3 Fatty Acids (FISH OIL) 1000 MG CAPS Take 1,000 mg by mouth daily. Historical Provider, MD   Vitamin D (CHOLECALCIFEROL) 1000 UNITS CAPS capsule Take 1,000 Units by mouth daily. Historical Provider, MD   vitamin B-12 (CYANOCOBALAMIN) 1000 MCG tablet Take 1,000 mcg by mouth daily. Historical Provider, MD     Vital Signs (Current) [unfilled]    Weight:   Wt Readings from Last 1 Encounters:   07/13/22 205 lb (93 kg)     Height:   Ht Readings from Last 1 Encounters:   08/17/22 6' (1.829 m)      BMI:  There is no height or weight on file to calculate BMI. Estimated body mass index is 27.8 kg/m² as calculated from the following:    Height as of 8/17/22: 6' (1.829 m). Weight as of 7/13/22: 205 lb (93 kg). body mass index is unknown because there is no height or weight on file. Cardiac Clearance: None   Medical Clearance:None   Appointment for surgery Clearance scheduled for:None     Preoperative Testing:   These are the current and completed labs:  CBC:   Lab Results   Component Value Date/Time    WBC 4.2 07/07/2021 08:05 AM    RBC 4.88 07/07/2021 08:05 AM    HGB 15.5 07/07/2021 08:05 AM    HCT 44.6 07/07/2021 08:05 AM    MCV 91.4 07/07/2021 08:05 AM    RDW 12.4 07/07/2021 08:05 AM     07/07/2021 08:05 AM     CMP:   Lab Results   Component Value Date/Time     07/05/2022 09:00 AM    K 4.2 07/05/2022 09:00 AM     07/05/2022 09:00 AM    CO2 25 07/05/2022 09:00 AM    BUN 20 07/05/2022 09:00 AM    CREATININE 1.1 07/05/2022 09:00 AM    GFRAA >60 02/16/2022 10:51 AM    LABGLOM > 60.0 07/05/2022 09:00 AM    LABGLOM >60 02/16/2022 10:51 AM    GLUCOSE 107 07/05/2022 09:00 AM

## 2022-09-01 ENCOUNTER — OFFICE VISIT (OUTPATIENT)
Dept: FAMILY MEDICINE CLINIC | Age: 67
End: 2022-09-01
Payer: MEDICARE

## 2022-09-01 ENCOUNTER — HOSPITAL ENCOUNTER (OUTPATIENT)
Dept: LAB | Age: 67
Discharge: HOME | End: 2022-09-01
Payer: MEDICARE

## 2022-09-01 VITALS
HEART RATE: 61 BPM | TEMPERATURE: 97.1 F | DIASTOLIC BLOOD PRESSURE: 60 MMHG | OXYGEN SATURATION: 95 % | SYSTOLIC BLOOD PRESSURE: 122 MMHG | HEIGHT: 72 IN | BODY MASS INDEX: 27.41 KG/M2 | WEIGHT: 202.4 LBS | RESPIRATION RATE: 16 BRPM

## 2022-09-01 DIAGNOSIS — B00.2 ORAL HERPES: ICD-10-CM

## 2022-09-01 DIAGNOSIS — Z00.00 MEDICARE ANNUAL WELLNESS VISIT, SUBSEQUENT: Primary | ICD-10-CM

## 2022-09-01 DIAGNOSIS — R97.20 ELEVATED PSA: ICD-10-CM

## 2022-09-01 DIAGNOSIS — N13.8 BPH WITH OBSTRUCTION/LOWER URINARY TRACT SYMPTOMS: ICD-10-CM

## 2022-09-01 DIAGNOSIS — N40.1 BPH WITH OBSTRUCTION/LOWER URINARY TRACT SYMPTOMS: ICD-10-CM

## 2022-09-01 PROCEDURE — G0439 PPPS, SUBSEQ VISIT: HCPCS | Performed by: FAMILY MEDICINE

## 2022-09-01 PROCEDURE — 84154 ASSAY OF PSA FREE: CPT

## 2022-09-01 PROCEDURE — 99397 PER PM REEVAL EST PAT 65+ YR: CPT | Performed by: FAMILY MEDICINE

## 2022-09-01 PROCEDURE — 36415 COLL VENOUS BLD VENIPUNCTURE: CPT

## 2022-09-01 PROCEDURE — 1123F ACP DISCUSS/DSCN MKR DOCD: CPT | Performed by: FAMILY MEDICINE

## 2022-09-01 RX ORDER — VALACYCLOVIR HYDROCHLORIDE 1 G/1
1000 TABLET, FILM COATED ORAL 2 TIMES DAILY
Qty: 30 TABLET | Refills: 1 | Status: SHIPPED | OUTPATIENT
Start: 2022-09-01 | End: 2022-10-01

## 2022-09-01 SDOH — ECONOMIC STABILITY: FOOD INSECURITY: WITHIN THE PAST 12 MONTHS, THE FOOD YOU BOUGHT JUST DIDN'T LAST AND YOU DIDN'T HAVE MONEY TO GET MORE.: NEVER TRUE

## 2022-09-01 SDOH — ECONOMIC STABILITY: FOOD INSECURITY: WITHIN THE PAST 12 MONTHS, YOU WORRIED THAT YOUR FOOD WOULD RUN OUT BEFORE YOU GOT MONEY TO BUY MORE.: NEVER TRUE

## 2022-09-01 ASSESSMENT — LIFESTYLE VARIABLES
HOW OFTEN DURING THE LAST YEAR HAVE YOU FOUND THAT YOU WERE NOT ABLE TO STOP DRINKING ONCE YOU HAD STARTED: 0
HOW OFTEN DURING THE LAST YEAR HAVE YOU BEEN UNABLE TO REMEMBER WHAT HAPPENED THE NIGHT BEFORE BECAUSE YOU HAD BEEN DRINKING: 0
HOW OFTEN DURING THE LAST YEAR HAVE YOU FAILED TO DO WHAT WAS NORMALLY EXPECTED FROM YOU BECAUSE OF DRINKING: 0
HOW OFTEN DO YOU HAVE A DRINK CONTAINING ALCOHOL: 4 OR MORE TIMES A WEEK
HOW MANY STANDARD DRINKS CONTAINING ALCOHOL DO YOU HAVE ON A TYPICAL DAY: 1 OR 2
HAVE YOU OR SOMEONE ELSE BEEN INJURED AS A RESULT OF YOUR DRINKING: 0
HOW OFTEN DURING THE LAST YEAR HAVE YOU HAD A FEELING OF GUILT OR REMORSE AFTER DRINKING: 0
HOW OFTEN DURING THE LAST YEAR HAVE YOU NEEDED AN ALCOHOLIC DRINK FIRST THING IN THE MORNING TO GET YOURSELF GOING AFTER A NIGHT OF HEAVY DRINKING: 0
HAS A RELATIVE, FRIEND, DOCTOR, OR ANOTHER HEALTH PROFESSIONAL EXPRESSED CONCERN ABOUT YOUR DRINKING OR SUGGESTED YOU CUT DOWN: 0

## 2022-09-01 ASSESSMENT — PATIENT HEALTH QUESTIONNAIRE - PHQ9
SUM OF ALL RESPONSES TO PHQ QUESTIONS 1-9: 0
SUM OF ALL RESPONSES TO PHQ QUESTIONS 1-9: 0
2. FEELING DOWN, DEPRESSED OR HOPELESS: 0
SUM OF ALL RESPONSES TO PHQ QUESTIONS 1-9: 0
1. LITTLE INTEREST OR PLEASURE IN DOING THINGS: 0
SUM OF ALL RESPONSES TO PHQ9 QUESTIONS 1 & 2: 0
SUM OF ALL RESPONSES TO PHQ QUESTIONS 1-9: 0

## 2022-09-01 ASSESSMENT — SOCIAL DETERMINANTS OF HEALTH (SDOH): HOW HARD IS IT FOR YOU TO PAY FOR THE VERY BASICS LIKE FOOD, HOUSING, MEDICAL CARE, AND HEATING?: NOT HARD AT ALL

## 2022-09-01 NOTE — PATIENT INSTRUCTIONS
Personalized Preventive Plan for Grace Scott - 9/1/2022  Medicare offers a range of preventive health benefits. Some of the tests and screenings are paid in full while other may be subject to a deductible, co-insurance, and/or copay. Some of these benefits include a comprehensive review of your medical history including lifestyle, illnesses that may run in your family, and various assessments and screenings as appropriate. After reviewing your medical record and screening and assessments performed today your provider may have ordered immunizations, labs, imaging, and/or referrals for you. A list of these orders (if applicable) as well as your Preventive Care list are included within your After Visit Summary for your review. Other Preventive Recommendations:    A preventive eye exam performed by an eye specialist is recommended every 1-2 years to screen for glaucoma; cataracts, macular degeneration, and other eye disorders. A preventive dental visit is recommended every 6 months. Try to get at least 150 minutes of exercise per week or 10,000 steps per day on a pedometer . Order or download the FREE \"Exercise & Physical Activity: Your Everyday Guide\" from [employer]. Call 8-368.269.8143 or search [employer] online. You need 1807-9784 mg of calcium and 5664-8363 IU of vitamin D per day. It is possible to meet your calcium requirement with diet alone, but a vitamin D supplement is usually necessary to meet this goal.  When exposed to the sun, use a sunscreen that protects against both UVA and UVB radiation with an SPF of 30 or greater. Reapply every 2 to 3 hours or after sweating, drying off with a towel, or swimming. Always wear a seat belt when traveling in a car. Always wear a helmet when riding a bicycle or motorcycle.     Mini mental exam is 30/30    I also discussed with pt that he needs to start cholesterol medication we went over his calcium heart score which showed plague but even though he knows the risk of a hear attack he will not go on cholesterol medication.     Elevated Cholesterol:  No greasy, fried, fast, fatty foods  No trans fats  Decreased red meat intake to once every 2 months  No butter, lopes nor cream cheese, cheese  No egg yolk  NO milk  Decrease your cholesterol in diet    Refilled medication    Fasting blood work and follow up in 4 months

## 2022-09-01 NOTE — PROGRESS NOTES
Medicare Annual Wellness Visit    Ata Hernandez is here for Medicare AWV (Hyperlipidemia- last lipids 2/16/2022)    Assessment & Plan   Medicare annual wellness visit, subsequent  Oral herpes  -     valACYclovir (VALTREX) 1 g tablet; Take 1 tablet by mouth 2 times daily Take 1 tablet by mouth 2 times daily prn basis when flare up., Disp-30 tablet, R-1Normal    Recommendations for Preventive Services Due: see orders and patient instructions/AVS.  Recommended screening schedule for the next 5-10 years is provided to the patient in written form: see Patient Instructions/AVS.     Return in 4 months (on 1/1/2023) for Medicare Annual Wellness Visit in 1 year, REVIEW LABS. Subjective   The following acute and/or chronic problems were also addressed today:  We discussed that he needs his 4th covid vaccine and a flu vaccine. He is under care of Dr. Devora Fontenot for BPH and LUTS. He is under care of neurology for chronic inflammatory demyelinating polyneuropathy. He is also under care of [address] [place] [address] for optometry. I also discussed with pt that he needs to start cholesterol medication we went over his calcium heart score which showed plague but even though he knows the risk of a hear attack he will not go on cholesterol medication. Patient's complete Health Risk Assessment and screening values have been reviewed and are found in Flowsheets. The following problems were reviewed today and where indicated follow up appointments were made and/or referrals ordered. Positive Risk Factor Screenings with Interventions:        Alcohol Screening:  Alcohol Use: Heavy Drinker    Frequency of Alcohol Consumption: 4 or more times a week    Average Number of Drinks: 1 or 2    Frequency of Binge Drinking: Monthly     AUDIT-C Score: 6  AUDIT Total Score: 6  An AUDIT-C score of 3-7 indicates potential alcohol risk. An AUDIT Total score of 8 or more is associated with harmful or hazardous drinking.  A score of 13 or more in women, Renetta Watkins MD   PRIVIGEN 20 GM/200ML SOLN solution  Yes Historical Provider, MD   tadalafil (CIALIS) 10 MG tablet Take 1 tablet by mouth as needed for Erectile Dysfunction Yes Ioana Chang MD   b complex vitamins capsule Take 1 capsule by mouth daily. Yes Historical Provider, MD   Ascorbic Acid (VITAMIN C) 250 MG tablet Take 500 mg by mouth daily. Yes Historical Provider, MD   Omega-3 Fatty Acids (FISH OIL) 1000 MG CAPS Take 1,000 mg by mouth daily. Yes Historical Provider, MD   Vitamin D (CHOLECALCIFEROL) 1000 UNITS CAPS capsule Take 1,000 Units by mouth daily. Yes Historical Provider, MD   vitamin B-12 (CYANOCOBALAMIN) 1000 MCG tablet Take 1,000 mcg by mouth daily.  Yes Historical Provider, MD Toussaint (Including outside providers/suppliers regularly involved in providing care):   Patient Care Team:  Adal Model, DO as PCP - General (Family Medicine)  [facility] Model, DO as PCP - [facility] Empaneled Provider  Pearl Pisano (Neurology)  Derrek Cristina (Optometry)  Renetta Watkins MD as Consulting Physician (Urology)     Reviewed and updated this visit:  Allergies  Meds

## 2022-09-02 LAB
PROSTATE SPECIFIC ANTIGEN FREE: 0.8 UG/L
PROSTATE SPECIFIC ANTIGEN PERCENT FREE: 24.2 %
PROSTATE SPECIFIC ANTIGEN: 3.3 UG/L (ref 0–4)

## 2022-09-14 NOTE — TELEPHONE ENCOUNTER
Spoke with patient, does not want to have procedure at this time. Will call when ready to reschedule. Procedure was cancelled.

## 2022-12-14 ENCOUNTER — TELEPHONE (OUTPATIENT)
Dept: RADIATION ONCOLOGY | Age: 67
End: 2022-12-14

## 2022-12-14 NOTE — PROGRESS NOTES
12/13/22 - call from Dr. Dorota Lizama, RO at [facility], asked to talk to Dr. Anamaria Back about a referral.   Dr. Anamaria Back spoke to Dr. Gus Machuca about pt and will accept referral.   12/14/22 - Pt came to desk in RO with documents from his prior path/op and office visits. No referral was included. I did call Dr. Gus Machuca office [phone] and requested an order for referral be faxed to us. I made consult appt and information given to pt.

## 2022-12-14 NOTE — PROGRESS NOTES
12/13/22 - call from Dr. Lauryn Ulloa, RO at [facility], asked to talk to Dr. Walterine Babinski about a referral.   Dr. Walterine Babinski spoke to Dr. Mamie Alejandro about pt and will accept referral.   12/14/22 - Pt came to desk in RO with documents from his prior path/op and office visits. No referral was included. I did call Dr. Mamie Alejandro office [phone] and requested an order for referral be faxed to us. I made consult appt and information given to pt.

## 2022-12-27 ENCOUNTER — HOSPITAL ENCOUNTER (OUTPATIENT)
Dept: RADIATION ONCOLOGY | Age: 67
Discharge: HOME | End: 2022-12-27
Payer: MEDICARE

## 2022-12-27 VITALS
SYSTOLIC BLOOD PRESSURE: 120 MMHG | DIASTOLIC BLOOD PRESSURE: 67 MMHG | WEIGHT: 210.2 LBS | BODY MASS INDEX: 28.27 KG/M2 | RESPIRATION RATE: 16 BRPM | HEART RATE: 55 BPM | OXYGEN SATURATION: 98 % | TEMPERATURE: 98 F

## 2022-12-27 DIAGNOSIS — C61 PROSTATE CANCER (HCC): Primary | ICD-10-CM

## 2022-12-27 PROCEDURE — 99213 OFFICE O/P EST LOW 20 MIN: CPT | Performed by: RADIOLOGY

## 2022-12-27 RX ORDER — FINASTERIDE 5 MG/1
5 TABLET, FILM COATED ORAL DAILY
COMMUNITY

## 2022-12-27 NOTE — PROGRESS NOTES
Referring Physician: Dr. Kinsey Vanegasment:    12/27/22 0805   BP: 120/67   Pulse: 55   Resp: 16   Temp: 98 °F (36.7 °C)   SpO2: 98%    :     Pain Assessment: None - Denies Pain          Wt Readings from Last 1 Encounters:   12/27/22 210 lb 3.2 oz (95.3 kg)        Body mass index is 28.27 kg/m². Immunizations:    Influenza status:    [x]   Current   []   Patient declined    Pneumococcal status:  []   Current  [x]   Patient declined  Covid status:   [x]  Dose #1:                     [x]  Dose #2:     [x]  Booster:               []  Patient declined    Smoking Status:    [] Smoker - PPD:   [x] Nonsmoker - Quit Date:    Quit 15 years ago           [] Never a smoker      No chief complaint on file. Cancer Staging  No matching staging information was found for the patient. Prior Radiation Therapy? No   If yes, site treated:   Facility:                             Date:    Concurrent Chemo/radiation? No   If yes, start date:    Prior Chemotherapy? No   If yes    Facility:                             Date:    Prior Hormonal Therapy or Contraceptive Medications? No   If yes,  Medication:                                Duration:    Head and Neck Cancer? No   If yes, please remind physician to place swallow study order and speech therapy order. Pacemaker/Defibulator/ICD:  No    Do you have any musculoskeletal diseases, Lupus or arthritic conditions? Yes- neuropathy right hand, left foot- receives immune globulin infusions monthly   If yes, are you currently taking Methotrexate?   []  Yes  [x]  No          PROSTATE patient only :    Oral hormone replacement therapy []  Yes  [x]  No            Current Outpatient Medications   Medication Sig Dispense Refill    finasteride (PROSCAR) 5 MG tablet Take 5 mg by mouth daily      tamsulosin (FLOMAX) 0.4 MG capsule Take 1 capsule by mouth in the morning and at bedtime TALE 1 CAPSULE ONCE A  capsule 1    PRIVIGEN 20 GM/200ML SOLN solution tadalafil (CIALIS) 10 MG tablet Take 1 tablet by mouth as needed for Erectile Dysfunction 30 tablet 3    b complex vitamins capsule Take 1 capsule by mouth daily. Ascorbic Acid (VITAMIN C) 250 MG tablet Take 500 mg by mouth daily. Omega-3 Fatty Acids (FISH OIL) 1000 MG CAPS Take 1,000 mg by mouth daily. Vitamin D (CHOLECALCIFEROL) 1000 UNITS CAPS capsule Take 1,000 Units by mouth daily. vitamin B-12 (CYANOCOBALAMIN) 1000 MCG tablet Take 1,000 mcg by mouth daily. No current facility-administered medications for this encounter.        Past Medical History:   Diagnosis Date    BPH (benign prostatic hypertrophy)     with outlet symptoms    Carpal tunnel syndrome, bilateral     Cervical disc disorder     Footdrop     Left    Herpes simplex labialis     episodic    Hyperlipidemia     Hypothyroidism     Mumps     Neck pain     Neuropathy     Paresthesias     Peripheral neuropathy     Pneumonia     history of    Ulnar neuropathy of right upper extremity        Past Surgical History:   Procedure Laterality Date    CARPAL TUNNEL RELEASE Right feb 2014    also ulnar nerve r elbow     COLONOSCOPY  2009    INGUINAL HERNIA REPAIR Left 9/14/2020    Laparoscopic Robotic Assisted Left Inguinal Hernia Repair performed by Karyn Ruiz DO at [facility] 106      peridontal    OTHER SURGICAL HISTORY      IVIG INFUSIONS STARTING NOVEMBER OF 2015 AND EVERY 3 MONTH SINCE    TOE SURGERY         Family History   Problem Relation Age of Onset    Heart Disease Mother     Cancer Mother         lung    Cancer Father         lung    Glaucoma Neg Hx        Social History     Socioeconomic History    Marital status:      Spouse name: Not on file    Number of children: 2    Years of education: Not on file    Highest education level: Not on file   Occupational History     Employer: [employer]   Tobacco Use    Smoking status: Former     Packs/day: 1.00     Years: 25.00     Pack years: 25.00 Types: Cigarettes     Start date: 1974     Quit date: 2005     Years since quittin.9    Smokeless tobacco: Never    Tobacco comments:     smoked for 25 years, stopped 2005. 1 pack daily   Vaping Use    Vaping Use: Never used   Substance and Sexual Activity    Alcohol use: Yes     Alcohol/week: 7.0 standard drinks     Types: 7 Cans of beer per week     Comment: 1 daily    Drug use: No    Sexual activity: Not on file     Comment: Unmarried.  x2. 2 children. Other Topics Concern    Not on file   Social History Narrative    Not on file     Social Determinants of Health     Financial Resource Strain: Low Risk     Difficulty of Paying Living Expenses: Not hard at all   Food Insecurity: No Food Insecurity    Worried About Running Out of Food in the Last Year: Never true    Ran Out of Food in the Last Year: Never true   Transportation Needs: Not on file   Physical Activity: Insufficiently Active    Days of Exercise per Week: 7 days    Minutes of Exercise per Session: 20 min   Stress: Not on file   Social Connections: Not on file   Intimate Partner Violence: Not on file   Housing Stability: Not on file             FALLS RISK SCREEN  Instructions:  Assess the patient and enter the appropriate indicators that are present for fall risk identification. Total the numbers entered and assign a fall risk score from Table 2.  Reassess patient at a minimum every 12 weeks or with status change. Assessment   Date  2022     1. Mental Ability: confusion/cognitively impaired 0     2. Elimination Issues: incontinence, frequency 0       3. Ambulatory: use of assistive devices (walker, cane, off-loading devices),        attached to equipment (IV pole, oxygen) 0     4. Sensory Limitations: dizziness, vertigo, impaired vision 0     5. Age less than 65        0     6. Age 72 or greater 1     7. Medication: diuretics, strong analgesics, hypnotics, sedatives,        antihypertensive agents 0   8.   Falls: recent history of falls within the last 3 months (not to include slipping or        tripping) 0   TOTAL 1    If score of 4 or greater was education given? No       TABLE 2   Risk Score Risk Level Plan of Care   0-3 Little or  No Risk 1. Provide assistance as indicated for ambulation activities  2. Reorient confused/cognitively impaired patient  3. Call-light/bell within patient's reach  4. Chair/bed in low position, stretcher/bed with siderails up except when performing patient care activities  5. Educate patient/family/caregiver on falls prevention  6.  Reassess in 12 weeks or with any noted change in patient condition which places them at a risk for a fall   4-6 Moderate Risk 1. Provide assistance as indicated for ambulation activities  2. Reorient confused/cognitively impaired patient  3. Call-light/bell within patient's reach  4. Chair/bed in low position, stretcher/bed with siderails up except when performing patient care activities  5. Educate patient/family/caregiver on falls prevention     7 or   Higher High Risk 1. Place patient in easily observable treatment room  2. Patient attended at all times by family member or staff  3. Provide assistance as indicated for ambulation activities  4. Reorient confused/cognitively impaired patient  5. Call-light/bell within patient's reach  6. Chair/bed in low position, stretcher/bed with siderails up except when performing patient care activities  7. Educate patient/family/caregiver on falls prevention             Assessment/Plan: Patient was seen today for consultation. He arrives ambulatory with a steady gait. He was recently diagnosed with prostate cancer and saw a Urologist in [place]. He sought a second opinion at [facility] and was referred to Dr. Jaron Hitchcock for radiation therapy. AUA Assessment form completed by patient. Dr. Jaron Hitchcock notified of assessment and examined patient.   He reviewed the role of radiation therapy in patient's treatment plan as well as desired and untoward effects. Questions were answered to his satisfaction. Informed consent process consent process completed by Dr. Nadira Castanon. Patient provided with fully executed copy of informed consent form. He will have a prostate MRI, PSA level and return for radiation teaching and treatment simulation.         Minda Oshea RN 12/27/2022 8:11 AM

## 2022-12-28 PROBLEM — C61 PROSTATE CANCER (HCC): Status: ACTIVE | Noted: 2022-12-28

## 2022-12-28 NOTE — CONSULTS
[address]            Radiation Oncology          [address] [facility], [address]        Ifrah Florence: [phone]        F: [phone]       [place]             2022    Patient: Norberto Romano   YOB: 1955       Dear Dr Spence ref. provider found: Thank you for referring Norberto Romano to me for evaluation. Below are the relevant portions of my assessment and plan of care. If you have questions, please do not hesitate to call me. I look forward to following this patient along with you. Sincerely,    Electronically signed by Cody Land MD on 22 at 8:25 AM EST    CC: Patient Care Team:  Monica Bethea DO as PCP - General (Family Medicine)  Susan Orantes (Neurology)  Tomas Mann (Optometry)  Patti Corona MD as Consulting Physician (Urology)  ------------------------------------------------------------------------------------------------------------------------------------------------------------------------------------------      RADIATION ONCOLOGY NEW PATIENT VISIT:    Date of Service: 2022    Location:  [facility] Radiation Oncology,   [address] [place] [address], [facility]   [phone]     Patient ID:   Norberto Romano  : 1955   MRN: [id number]    CHIEF COMPLAINT: \"Prostate Cancer\"    DIAGNOSIS:  Cancer Staging  Prostate cancer [facility])  Staging form: Prostate, AJCC 8th Edition  - Clinical stage from 2022: Stage IIB (cT1c, cN0, cM0, PSA: 4.8, Grade Group: 2) - Signed by Cody Land MD on 2022      HISTORY OF PRESENT ILLNESS:   Norberto Romano is a 75 y.o. male with a history of urinary symptoms of urgency and frequency in had a PSA drawn on 2022 which came back elevated at 4.75. Patient was therefore referred to urology. He underwent a prostate biopsy in 2022 which revealed 4 cores of prostatic adenocarcinoma with 3 Desi 3+4 and 1 3+3.   Given the findings patient was discussed options of surgery, however patient wanted to seek out a second opinion and went to [facility]. Patient was reviewed options once more and elected for radiation treatments. Patient was seen by radiation oncology in [facility] and recommended radiation therapy closer to home as it would be logistically difficult to drive every day at [facility]. Patient was started on Proscar and Flomax which have helped with his urinary symptoms. He otherwise denies any other symptoms headaches or dizziness, chest pain, shortness of breath, abdominal pain, nausea, diarrhea, bony pain, weight loss, fatigue, swelling, weight loss, bleeding.       AUA Score: 12    PRIOR RADIATION HISTORY:  No prior history of radiation therapy    PACEMAKER: None    PAST MEDICAL HISTORY:  Past Medical History:   Diagnosis Date    BPH (benign prostatic hypertrophy)     with outlet symptoms    Carpal tunnel syndrome, bilateral     Cervical disc disorder     Footdrop     Left    Herpes simplex labialis     episodic    Hyperlipidemia     Hypothyroidism     Mumps     Neck pain     Neuropathy     Paresthesias     Peripheral neuropathy     Pneumonia     history of    Ulnar neuropathy of right upper extremity        PAST SURGICAL HISTORY:  Past Surgical History:   Procedure Laterality Date    CARPAL TUNNEL RELEASE Right feb 2014    also ulnar nerve r elbow     COLONOSCOPY  2009    INGUINAL HERNIA REPAIR Left 9/14/2020    Laparoscopic Robotic Assisted Left Inguinal Hernia Repair performed by Jason Castañeda DO at [facility]      peridontal    OTHER SURGICAL HISTORY      IVIG INFUSIONS STARTING NOVEMBER OF 2015 AND EVERY 3 MONTH SINCE    TOE SURGERY         MEDICATIONS:    Current Outpatient Medications:     finasteride (PROSCAR) 5 MG tablet, Take 5 mg by mouth daily, Disp: , Rfl:     tamsulosin (FLOMAX) 0.4 MG capsule, Take 1 capsule by mouth in the morning and at bedtime TALE 1 CAPSULE ONCE A DAY, Disp: 120 capsule, Rfl: 1    PRIVIGEN 20 GM/200ML SOLN solution, , Disp: , Rfl:     tadalafil (CIALIS) 10 MG tablet, Take 1 tablet by mouth as needed for Erectile Dysfunction, Disp: 30 tablet, Rfl: 3    b complex vitamins capsule, Take 1 capsule by mouth daily. , Disp: , Rfl:     Ascorbic Acid (VITAMIN C) 250 MG tablet, Take 500 mg by mouth daily. , Disp: , Rfl:     Omega-3 Fatty Acids (FISH OIL) 1000 MG CAPS, Take 1,000 mg by mouth daily. , Disp: , Rfl:     Vitamin D (CHOLECALCIFEROL) 1000 UNITS CAPS capsule, Take 1,000 Units by mouth daily. , Disp: , Rfl:     vitamin B-12 (CYANOCOBALAMIN) 1000 MCG tablet, Take 1,000 mcg by mouth daily. , Disp: , Rfl:     ALLERGIES:   No Known Allergies    SOCIAL HISTORY:  Social History     Socioeconomic History    Marital status:     Number of children: 2   Occupational History     Employer:  [employer]   Tobacco Use    Smoking status: Former     Packs/day: 1.00     Years: 25.00     Pack years: 25.00     Types: Cigarettes     Start date: 1974     Quit date: 2005     Years since quittin.0    Smokeless tobacco: Never    Tobacco comments:     smoked for 25 years, stopped 2005. 1 pack daily   Vaping Use    Vaping Use: Never used   Substance and Sexual Activity    Alcohol use:  Yes     Alcohol/week: 7.0 standard drinks     Types: 7 Cans of beer per week     Comment: 1 daily    Drug use: No     Social Determinants of Health     Financial Resource Strain: Low Risk     Difficulty of Paying Living Expenses: Not hard at all   Food Insecurity: No Food Insecurity    Worried About Running Out of Food in the Last Year: Never true    Ran Out of Food in the Last Year: Never true   Physical Activity: Insufficiently Active    Days of Exercise per Week: 7 days    Minutes of Exercise per Session: 20 min       FAMILY HISTORY:  Family History   Problem Relation Age of Onset    Heart Disease Mother     Cancer Mother         lung    Cancer Father         lung    Glaucoma Neg Hx        REVIEW OF SYSTEMS:    GENERAL/CONSTITUTIONAL: The patient denies fever, fatigue, weakness, weight gain or weight loss. HEENT: The patient denies pain, redness, loss of vision, double or blurred vision. The patient denies ringing in the ears, loss of hearing, hoarseness, trouble swallowing, or painful swallowing. CARDIOVASCULAR: The patient denies chest pain, irregular heartbeats, sudden changes in heartbeat or palpitation. RESPIRATORY: The patient denies shortness of breath, cough, hemoptysis. GASTROINTESTINAL: The patient denies nausea, vomiting, diarrhea, constipation, blood in the stools. GENITOURINARY: (+) Urgency. The patient denies difficult urination, pain or burning with urination, blood in the urine. MUSCULOSKELETAL: The patient denies arm, buttock, thigh or calf cramps. No joint or muscle pain. SKIN: The patient denies easy bruising, skin redness, skin rash, hives. NEUROLOGIC: The patient denies headache, dizziness, fainting, muscle spasm, loss of consciousness. ENDOCRINE: The patient denies intolerance to hot or cold temperature, flushing. HEMATOLOGIC/LYMPHATIC: The patient denies anemia, bleeding tendency or clotting tendency. ALLERGIC/IMMUNOLOGIC: The patient denies rhinitis, asthma, skin sensitivity. PYSCHOLOGIC: The patient denies any depression, anxiety, or confusion. A full 14 point review of systems was performed and assessed and found to be negative except as noted above. PHYSICAL EXAMINATION:    CHAPERONE: Not Required    ECO Asymptomatic    VITAL SIGNS: /67   Pulse 55   Temp 98 °F (36.7 °C) (Temporal)   Resp 16   Wt 210 lb 3.2 oz (95.3 kg)   SpO2 98%   BMI 28.27 kg/m²   GENERAL:  General appearance is that of a well-nourished, well-developed in no apparent distress. HEENT: Normocephalic, atraumatic, EOMI, moist mucosa, no erythema. NECK:  No adenopathy or a palpable thyroid mass, trachea is midline. LYMPHATICS: No cervical, supraclavicular adenopathy.   HEART: Normal rate and regular rhythm, normal heart sounds. LUNGS:  Pulmonary effort normal. Normal breath sounds. ABDOMEN:  Soft, nontender, non distended, and no hepatosplenomegaly. EXTREMITIES:  No edema. No calf tenderness. MSK:  No spinal tenderness. Normal ROM. NEUROLOGICAL: Alert and oriented. Strength and sensation intact bilaterally. No focal deficits. PSYCH: Mood normal, behavior normal.  SKIN: No erythema, no desquamation. RECTAL: Deferred     LABS:  WBC   Date Value Ref Range Status   07/07/2021 4.2 3.5 - 11.3 k/uL Final     Segs Absolute   Date Value Ref Range Status   07/07/2021 2.27 1.50 - 8.10 k/uL Final     Hemoglobin   Date Value Ref Range Status   07/07/2021 15.5 13.0 - 17.0 g/dL Final     Platelets   Date Value Ref Range Status   07/07/2021 196 138 - 453 k/uL Final     No results found for: , CEA  No results found for:   PSA   Date Value Ref Range Status   09/01/2022 3.3 0.0 - 4.0 ug/L Final     Comment:           Siemens Immulite 2000 immunometric chemiluminescent assay is used. Results obtained with   different assay methods or instruments cannot be used interchangeably. 08/01/2022 4.75 (H) <4.1 ng/mL Final     Comment:     The Roche \"ECLIA\" assay is used. Results obtained with different assay methods cannot be   used interchangeably. 02/16/2022 3.2 0.0 - 4.0 ug/L Final     Comment:           Siemens Immulite 2000 immunometric chemiluminescent assay is used. Results obtained with   different assay methods or instruments cannot be used interchangeably. 01/10/2022 4.71 (H) <4.1 ug/L Final     Comment:     The Roche \"ECLIA\" assay is used. Results obtained with different assay methods cannot be   used interchangeably. 03/17/2020 2.3 0.0 - 4.0 ug/L Final     Comment:           Siemens Immulite 2000 immunometric chemiluminescent assay is used. Results obtained with   different assay methods or instruments cannot be used interchangeably.      03/17/2020 2.99 <4.1 ug/L Final     Comment:     The Roche \"ECLIA\" assay is used. Results obtained with different assay methods cannot be   used interchangeably. IMAGING:   None     PATHOLOGY:  11/18/22 Prostate Biopsy (Outside records scanned into Media folder)  3 cores prostatic adenocarcinoma [place] 3+4  1 core prostatic adenocarcinoma Desi 3+3      ASSESSMENT AND PLAN:   Jackie Valencia is a 75 y.o. male with a Cancer Staging  Prostate cancer ([facility] [address]  Staging form: Prostate, AJCC 8th Edition  - Clinical stage from 11/18/2022: Stage IIB (cT1c, cN0, cM0, PSA: 4.8, Grade Group: 2) - Signed by Perry Rebollar MD on 12/28/2022       We reviewed with the patient the testing that has been done so far, including imaging and pathology results. We also reviewed their staging based on the testing that as been done and the recommendations per the NCCN guidelines. We discussed the options of treatment, including surgery, active surveillance, and radiation, and the rationale of why radiation therapy would be indicated for this diagnosis. We also discussed that they have the option to not pursue our recommended treatment as well. Given her very favorable intermediate risk, patient was reviewed treatment options. To rule out high risk features and possible upstaging, patient has been recommended to undergo a prostate MRI. Based on the findings patient with be able to make a decision on treatment. We reviewed the logistics of radiation treatment planning, including a CT Simulation session, as well as daily treatments for approximately 6 weeks. With regards to radiation to the prostate, I discussed the possible short-term side effects of skin irritation (causing redness, dryness or peeling), tiredness, low blood counts (causing infection or bleeding), nausea/vomiting, diarrhea, rectal bleeding, pain with urination, urgency and increased frequency of urination and blood in the urine.   Possible long-term side effects discussed included hyperpigmentation of the skin, damage to the bowel or intestines (resulting in bleeding or obstruction that may require surgery), damage to the bladder (resulting in decreased capacity and bleeding that may require surgery), and erectile dysfunction. After ample time to review the patient's questions, an informed consent was obtained to proceed with scheduling a treatment planning session for radiation therapy. I will also repeat PSA as its been 3 months since his last monitor. Patient was in agreement with my recommendations. All questions were answered to their satisfaction. Patient was advised to contact us anytime should they have any questions or concerns. Electronically signed by Erica Fontaine MD on 12/28/22 at 8:25 AM EST      Drugs Prescribed:  New Prescriptions    No medications on file       Orders Placed:     Orders Placed This Encounter   Procedures    MRI PROSTATE W WO CONTRAST    PSA, Diagnostic    BUN & Creatinine         CC:  Patient Care Team:  Leoncio Nieves DO as PCP - General (Family Medicine)  Celestine Grover (Neurology)  Jake Trevizo (Optometry)  Anthony Clancy MD as Consulting Physician (Urology)         Total time spent on this case on the day of encounter is 60 minutes. This time includes combination of one or more of the following - review of necessary tests, review of pertinent medical records from the EMR, performing medically appropriate examination and evaluation, counseling and educating the patient/family/caregiver, ordering necessary medical tests, procedures etc., documenting the clinical information in the electronic medical record, care coordination, referring and communicating with other health care providers and interpretation of results independently.  This note is created with the assistance of a speech recognition program.  While intending to generate a document that actually reflects the content of the visit, the document can still have some errors including those of syntax and sound a like substitutions which may escape proof reading. It such instances, actual meaning can be extrapolated by contextual diversion.

## 2023-01-06 ENCOUNTER — HOSPITAL ENCOUNTER (OUTPATIENT)
Age: 68
Discharge: HOME | End: 2023-01-06
Payer: MEDICARE

## 2023-01-06 ENCOUNTER — HOSPITAL ENCOUNTER (OUTPATIENT)
Dept: MRI IMAGING | Age: 68
End: 2023-01-06
Payer: MEDICARE

## 2023-01-06 DIAGNOSIS — C61 PROSTATE CANCER (HCC): ICD-10-CM

## 2023-01-06 LAB
BUN BLDV-MCNC: 18 MG/DL (ref 8–23)
CREAT SERPL-MCNC: 1.04 MG/DL (ref 0.7–1.2)
EGFR, POC: >60 ML/MIN/1.73M2
GFR SERPL CREATININE-BSD FRML MDRD: >60 ML/MIN/1.73M2
POC CREATININE: 1.09 MG/DL (ref 0.51–1.19)
PROSTATE SPECIFIC ANTIGEN: 2.53 NG/ML

## 2023-01-06 PROCEDURE — 6360000004 HC RX CONTRAST MEDICATION: Performed by: RADIOLOGY

## 2023-01-06 PROCEDURE — 36415 COLL VENOUS BLD VENIPUNCTURE: CPT

## 2023-01-06 PROCEDURE — 84520 ASSAY OF UREA NITROGEN: CPT

## 2023-01-06 PROCEDURE — 72197 MRI PELVIS W/O & W/DYE: CPT

## 2023-01-06 PROCEDURE — 84153 ASSAY OF PSA TOTAL: CPT

## 2023-01-06 PROCEDURE — 82565 ASSAY OF CREATININE: CPT

## 2023-01-06 PROCEDURE — 2580000003 HC RX 258: Performed by: RADIOLOGY

## 2023-01-06 PROCEDURE — A9579 GAD-BASE MR CONTRAST NOS,1ML: HCPCS | Performed by: RADIOLOGY

## 2023-01-06 RX ORDER — SODIUM CHLORIDE 0.9 % (FLUSH) 0.9 %
10 SYRINGE (ML) INJECTION PRN
Status: DISCONTINUED | OUTPATIENT
Start: 2023-01-06 | End: 2023-01-09 | Stop reason: HOSPADM

## 2023-01-06 RX ORDER — 0.9 % SODIUM CHLORIDE 0.9 %
50 INTRAVENOUS SOLUTION INTRAVENOUS ONCE
Status: COMPLETED | OUTPATIENT
Start: 2023-01-06 | End: 2023-01-06

## 2023-01-06 RX ADMIN — SODIUM CHLORIDE 50 ML: 9 INJECTION, SOLUTION INTRAVENOUS at 14:39

## 2023-01-06 RX ADMIN — GADOTERIDOL 20 ML: 279.3 INJECTION, SOLUTION INTRAVENOUS at 14:38

## 2023-01-06 RX ADMIN — SODIUM CHLORIDE, PRESERVATIVE FREE 10 ML: 5 INJECTION INTRAVENOUS at 14:38

## 2023-01-12 ENCOUNTER — HOSPITAL ENCOUNTER (OUTPATIENT)
Dept: RADIATION ONCOLOGY | Age: 68
Discharge: HOME | End: 2023-01-12
Payer: MEDICARE

## 2023-01-12 PROCEDURE — 77334 RADIATION TREATMENT AID(S): CPT | Performed by: RADIOLOGY

## 2023-01-12 NOTE — DISCHARGE INSTRUCTIONS
Pelvis Side Effects  Diarrhea  Fatigue  Hair loss  Nausea and vomiting  Sexual and fertility changes  Skin changes  Urinary and bladder changes    Ways to Manage Diarrhea   When you have diarrhea:  Drink 8 to 12 cups of clear liquid per day. Severe diarrhea can cause your to become dehydrated, a problem that can become serious. This problem is caused by the loss of too much water from the body. Making sure you drink enough can help prevent it. If you drink liquids that are high in sugar (such as fruit juice, sweet iced tea, Arnulfo-Aid, or Hi-C) ask your nurse or dietitian if you should mix them with extra water. Eat small meals and snacks. Many people find that they eat better if they eat 5 to 6 small means and snacks each day, rather than 3 large meals  Eat foods that are high in salts such as sodium and potassium. Your body can lose these salts when you have diarrhea, and it is important to replace them. Foods that are high in sodium or potassium include bananas, oranges, peach and apricot nectar, and boiled or mashed potatoes. Eat low-fiber foods. Foods that are high in fiber can make diarrhea worse. Low-fiber foods include bananas, white rice, white toast, and plan or vanilla yogurt. Take care of your rectal area. Instead of toilet paper, use a baby wipe or squirt water from a spray bottle to clean yourself after bowel movements. Also, ask your nurse about taking sitz baths, which is a warm-water bath taken in a sitting position that covers only the hips and buttocks. Be sure to tell your doctor or nurse if you rectal area gets sore.   Avoid:  Beer, wine, and other types of alcohol  Milk and dairy foods, such as ice cream, sour cream, and cheese  Spicy foods, such as hot sauce, salsa, chili, and delgado dishes  Foods or dinks with caffeine, such as regular coffee, black tea, soda and chocolate  Foods or drinks that cause gas, such as cooked dried beans, cabbage, broccoli, soy milk, and other soy products   Foods that are high in fiber, such as raw fruits and vegetables, cooked dried beans, and whole wheat breads and cereals  Fried or greasy foods  Food from [employer]  Talk with your doctor or nurse. Tell him or her if you are having diarrhea. He or she will suggest ways to manage it. He or she may also suggest taking medicine, such as imodium. Fatigue  How long it lasts  When you will first feel fatigue depends on a few factors, such as your age, health, how active you are, and how you felt before radiation therapy started. Fatigue can last from 6 weeks to a year after your last radiation therapy session. Some people may always feel fatigue and not have as much energy as they did before radiation therapy. Ways to Manage Fatigue  Try to sleep at least 8 hours each night. This may be more sleep than you needed before radiation therapy. One way to sleep better at night is to be active during the day. Another way is to relax right before going to bed. Do calming activities before bedtime, such as reading, working on a [email or web address] puzzle, or listening to music. Plan time to rest. Take short naps or rest breaks between activities. Try not to do too much. With fatigue, you ay not have enough energy to do all the things you want to do. Stay active, but choose the activities that are most important to you. Try to let go of things that don't matter as much now. For example, you might go to work but not do housework. You might watch your children's sprots events but not cook dinner. Exercise. Research shows that most people feel better when they get some exercise each day. Go for a short walk, ride a bike, or do yoga. Talk with your doctor or nurse about types of exercise you can do while having radiation therapy. Relax. Mediatation, prayer, gentle yoga, guided imagery, and visualization are ways you can learn to relax and decrease stress.     For relaxation exercises:  Visit Learning to Relax on the National Cancer Ord's website at : www.cancer.gov/about-cancer/copingfeelings/relaxation  See Facing Forward: Life After Cancer Treatment at: www.cancer.gov/publications/patient-education/facing-forward  Eat and drink well. It can be easier to eat if you have 5 or 6 small meals each day, rather than 3 large ones. Keep foods around that are easy to fix, such as canned soups, frozen meals, yogurt, and cottage cheese. Drink plenty of fluids each day-about 8 cups of water or juice. Plan a work schedule that is right for you. Fatigue may affect the amount of energy you have for your job. You may feel well enough to work your full schedule, or you may need to work less-maybe just a few hours a day or a few days each week. You may want to talk with your boss about ways to work from home so you do not need to commute. If possible, you may want to think about going on medical leave while you have radiation therapy. Ways to Manage Nausea and Vomiting    Plan when to eat and drink. Some people feel better when they eat before getting radiation therapy and others do not. Learn the best time for you to each and drink. Try a light snack, such as crackers and apple juice 1 to 2 hours before radiation therapy. Or, you might feel better if you have treatment on an empty stomach, which means not eating 2 or 3 hours before treatment. Eat small meals and snacks. Many people find that they eat better if they eat 5 or 6 small meals and snacks each day, rather than 3 large meals. Make sure to eat slowly and do not rush. Have foods and drinks that are at room temperature (not too hot and not too cold). Before eating or drinking, give hot food and drinks a chance to cool down. Warm cold food and drinks in the microwave for a short time. Talk with your doctor or nurse. He or she may suggest a special diet or prescribe medicine to help prevent nausea.  You might also ask your doctor or nurse about acupuncture, which may help relieve nausea and vomiting caused by cancer treatment. Acupuncture is a type of complementary and alternative medicine. It is a technique that involves inserting thin needles through the skin at specific points on the body. Skin Changes  What they are:  Radiation therapy can cause skin changes in your treatment area. Here are some common skin changes:  Redness. Your skin in the treatment area may look as if you have a mild to severe sunburn or tan  Severe itching. The skin in your treatment area may itch very badly. It is important to avoid scratching, which can cause skin breakdown and infection. Skin breakdown is a problem that happens when the skin in the treatment area peels off faster than it can grow back. Dry and peeling skin. The skin in your treatment area can get very dry. It may get so dry that it starts to peel, as if you have had a bad sunburn. If it peels off faster than it can grow back, you may develop sores or ulcers. Moist reaction. The skin in your treatment area can become wet, sore and infected This problem is more common where you have skin folds, such as your buttocks, behind your ears and under your breasts. It may also occur where your skin is very thin, such as your neck. Ways to Manage Skin Changes    Skin Care. Take extra good care of your skin during radiation therapy. Be gentle and do not rub, scrub, or scratch in the treatment area. Use creams that your doctor or nurse suggests. Jacob / Oz Hannah / Lily   Apply recommended lotion to treatment area 2 times a day. This should begin when you start radiation treatments. Do not put anything on your skin that is very hot or cold. Do not use heating pads, ice packs or other hot or cold items on the treatment area  Be gentle when you shower or take a bath. You can take a lukewarm shower every day. If you prefer to take a lukewarm bath, so do only every other day and don't soak  For too long.  Whether you take a shower or bath, make sure to use a mild soap. Dry yourself with a soft towel by patting, not rubbing, your skin. Be careful not to wash off the ink markings that you need for radiation therapy. Use only those lotions and skin products that your doctor or nurse suggests. If you are using a prescribed cream for a skin problem or acne, tell your doctor or nurse before you begin radiation treatment. Check with your doctor or nurse before using any of the following skin products: Bubble bath     Cornstarch  Cream  Deodorant  Hair removers  Makeup  Oil   Ointment  Perfume  Powder  Soap   Sunscreen  Cool, humid places. Your skin may feel much better when you are in a cool, humid places. You can make rooms more humid by putting a bowl of water on the radiator or using a humidifier. If you use a humidifier, be sure to follow the directions about cleaning it to prevent bacteria. Soft fabrics. Wear clothes and use bed sheets that are made of very soft fabrics. Do not wear clothes in your treatment area that are tight and do not breathe, such as girdles, body shapers, and pantyhose  Protect your skin from the sun every day. The sun can burn you even on cloudy days or when you are outside for just a few minutes. Do not go to the beach or sunbathe. Wear a broad-brimmed hat, long-sleeved shirt, and long pants when you are outside. Talk with your doctor or nurse about sunscreen lotions. He or she may suggest that you use a sunscreen with an SPF of 30 or higher. You will need to protect your skin form the sun even after radiation therapy is over. Do not use tanning beds. Tanning beds expose you to the same harmful effects as the sun. Adhesive tape. Do not put adhesive bandages or other types of sticky tape on your skin in the treatment area. Talk with your doctor or nurse about ways to bandage without tape. Shaving. Ask your doctor or nurse if you can shave the treatment area.  If you can shave, use an electric razor, but do not use a pre-shave liquid. Rectal area. If you have radiation therapy to the rectal area, you are likely to have skin problems. These problems are often worse after a bowel movement. Clean yourself with a baby wipe or squirt of water from a spray bottle. Ask your nurse if sitz baths might help you. Sitz baths are warm-water baths taken in a sitting position that covers only the hips and buttocks. Talk with your doctor or nurse. Some skin changes can be very serious. Your treatment team will check for skin changes each time you have radiation therapy. Make sure to report any skin changes that you notice. Medicine. Medicines can help with some skin changes. These include lotions for dry or itchy skin, antibiotics to treat infection, and drug to reduce swelling or itching. Urinary and Bladder Changes  What they are:Radiation therapy can cause urinary and bladder problems, which can include:  Burning or pain when you begin to urinate or after you urinate (empty your bladder)  Trouble starting to urinate  Trouble emptying your bladder completely  Frequent, urgent need to urinate  Cystitis, a swelling (inflammation) in your urinary tract  Incontinence, when you cannot control the flow of urine from your bladder, especially when coughing or sneezing  Waking frequently to urinate  Blood in your urine  Bladder spasms, which are like painful muscle cramps    Ways to Manage Urinary and Bladder Changes  Drink lots of fluids. Drink 6 to 8 cups of fluids each day, enough so that your urine is clear to light yellow in color  Avoid coffee, black tea, alcohol, spices, and all tobacco products  Talk with your doctor or nurse if you think you have urinary or bladder problems. You may need to provide a urine sample to check whether you have an infection  Talk with your doctor or nurse if you have incontinence. He or she may refer you to a physical therapist who will assess your problem.  The therapist can give you exercises to improve bladder control. Medicine. Your doctor may prescribe antibiotics if your problems are caused by an infection. Other medicines can help you urinate, reduce burning or pain, and ease bladder spasm  [facility]      What to expect next! Simulation Scan      Prior to your radiation you will need a simulation CT scan. This scan is where they will precisely identify the area on your body where you will receive radiation. Positioning is extremely important, and your body will be positioned carefully. You will be in the same position during every treatment, and you will need to remain still during the treatments. Your doctor may order a mold or a mask (for head and neck treatment only) to help reproduce your treatment set up. You will also receive tattoos during this appointment. These tattoos are very important. The therapists use these tattoos to line your body up on the treatment table. Information from the CT scan is used to precisely locate the treatment fields and create a \"map\" for the physician to design the treatment. The CT scanner is specially designed to work with the other equipment in the department and is not a replacement for other diagnostic scans you may have received. After simulation, details from the procedure are forwarded to medical radiation dosimetrists and medical physicists. These professionals perform highly technical calculations that will be used to set up the treatment machine (linear accelerator). The dosimetrist and physicist work closely with your radiation oncologist to develop the treatment plan, a process that typically takes 7-10 business days. Once the planning is completed, a therapist will call you with a start date. During that call your appointment time will also be determined. Your appointment time will be at the same time each day.                          Head and Neck Mask                            Body Mold                        Radiation Tattoo  Daily Treatments You will be placed on the treatment table in the same position as you were when you had your simulation scan. Once in place, a set of X-ray films will be taken to ensure that the treatment will be delivered the same way as it was simulated. Once the films and positioning are confirmed, a treatment will be delivered. Factors that affect the total length of the treatment include the complexity of your treatment and how quickly you can be positioned properly for treatment. Treatments are usually given once a day, Monday through Friday, for a number of weeks. The number of weeks is determined by national cancer guidelines and your physician. Each treatment generally takes only 10 to 15 minutes; however, you will likely be in the department for 20- 30 minutes each day. If your doctor has recommended SBRT treatments, your schedule will be different than mentioned above. Weekly Visits    During your treatments you will have a weekly appointment with your radiation oncologist. This appointment is to evaluate how you are tolerating your treatments and to address any questions/concerns you may have. Follow Up  We will follow your progress and see you on a regular basis. The duration between appointments vary depending on your tolerance to your treatments and your doctor's discretion. We understand that you may be seeing many other physicians, but it is important for us to participate in this follow-up process so that any radiation-related problems can be addressed if needed.

## 2023-01-12 NOTE — PROGRESS NOTES
Pt here today for teach and simulation scan. Radiation and You information provided along with additional education regarding radiation therapy and what to expect. Pt verbalizes understanding and all questions answered to the best of my knowledge. Samples of aquaphor and community resource information provided. Pt escorted to CT room without any difficulty. Site specific side effects of prostate radiation treatments (28 fractions) reviewed with patient. Patient aware to arrive for treatment with a comfortably full bladder.

## 2023-01-13 ENCOUNTER — TELEPHONE (OUTPATIENT)
Dept: ONCOLOGY | Age: 68
End: 2023-01-13

## 2023-01-13 NOTE — TELEPHONE ENCOUNTER
[address] [facility],4Th Floor [place] [facility] Nutrition Note    PGSGA scoring tool reviewed with score <4. Automatic nutrition assessment consult populates from PGSGA tool for scores > 4. Will continue to follow as requested.     NERY Munoz, RD, LD  Registered Dietitian   [facility]  [phone]

## 2023-01-16 ENCOUNTER — HOSPITAL ENCOUNTER (OUTPATIENT)
Dept: RADIATION ONCOLOGY | Age: 68
Discharge: HOME | End: 2023-01-16
Payer: MEDICARE

## 2023-01-16 PROCEDURE — 77301 RADIOTHERAPY DOSE PLAN IMRT: CPT | Performed by: RADIOLOGY

## 2023-01-16 PROCEDURE — 77300 RADIATION THERAPY DOSE PLAN: CPT | Performed by: RADIOLOGY

## 2023-01-16 PROCEDURE — 77338 DESIGN MLC DEVICE FOR IMRT: CPT | Performed by: RADIOLOGY

## 2023-01-30 ENCOUNTER — APPOINTMENT (OUTPATIENT)
Dept: RADIATION ONCOLOGY | Age: 68
End: 2023-01-30
Payer: MEDICARE

## 2023-01-30 PROCEDURE — 77385 HC NTSTY MODUL RAD TX DLVR SMPL: CPT | Performed by: RADIOLOGY

## 2023-01-31 ENCOUNTER — APPOINTMENT (OUTPATIENT)
Dept: RADIATION ONCOLOGY | Age: 68
End: 2023-01-31
Payer: MEDICARE

## 2023-01-31 PROCEDURE — 77385 HC NTSTY MODUL RAD TX DLVR SMPL: CPT | Performed by: RADIOLOGY

## 2023-02-01 ENCOUNTER — HOSPITAL ENCOUNTER (OUTPATIENT)
Dept: RADIATION ONCOLOGY | Age: 68
Discharge: HOME | End: 2023-02-01
Payer: MEDICARE

## 2023-02-01 VITALS
RESPIRATION RATE: 16 BRPM | TEMPERATURE: 98 F | OXYGEN SATURATION: 99 % | WEIGHT: 209 LBS | SYSTOLIC BLOOD PRESSURE: 142 MMHG | BODY MASS INDEX: 28.35 KG/M2 | DIASTOLIC BLOOD PRESSURE: 79 MMHG | HEART RATE: 55 BPM

## 2023-02-01 DIAGNOSIS — C61 MALIGNANT NEOPLASM OF PROSTATE (HCC): ICD-10-CM

## 2023-02-01 LAB
RAD ONC MSQ ACTUAL FRACTIONS DELIVERED: 3
RAD ONC MSQ ACTUAL SESSION DELIVERED DOSE: 250 CGRAY
RAD ONC MSQ ACTUAL TOTAL DOSE: 750 CGRAY
RAD ONC MSQ ELAPSED DAYS: 2
RAD ONC MSQ LAST DATE: NORMAL
RAD ONC MSQ PRESCRIBED FRACTIONAL DOSE: 250 CGRAY
RAD ONC MSQ PRESCRIBED NUMBER OF FRACTIONS: 28
RAD ONC MSQ PRESCRIBED TECHNIQUE: NORMAL
RAD ONC MSQ PRESCRIBED TOTAL DOSE: 7000 CGRAY
RAD ONC MSQ START DATE: NORMAL
RAD ONC MSQ TREATMENT COURSE NUMBER: 1
RAD ONC MSQ TREATMENT SITE: NORMAL

## 2023-02-01 PROCEDURE — 77336 RADIATION PHYSICS CONSULT: CPT | Performed by: RADIOLOGY

## 2023-02-01 PROCEDURE — 77385 HC NTSTY MODUL RAD TX DLVR SMPL: CPT | Performed by: RADIOLOGY

## 2023-02-01 NOTE — PROGRESS NOTES
[facility] [address]       Radiation Oncology          [address]          [place], [address]        [place]: [phone]        F: [phone]       [email or web address]             RADIATION ONCOLOGY WEEKLY PROGRESS NOTE  Patient ID:   Basilia Torres  : 1955   MRN: [id number]    Location:  [facility] Radiation Oncology,   [address] [place]   [phone]    DIAGNOSIS:  Cancer Staging  Prostate cancer [facility])  Staging form: Prostate, AJCC 8th Edition  - Clinical stage from 2022: Stage IIB (cT1c, cN0, cM0, PSA: 4.8, Grade Group: 2) - Signed by Josh Roblero MD on 2022      TREATMENT DETAILS:  Treatment Site: ProstateSV  Actual Dose: 750cGy  Total Planned Dose: 7000cGy  Treatment Technique: IMRT  Fraction Technique: Daily  Therapy imaging monitoring: CBCT daily  Concurrent Chemotherapy: NA    SUBJECTIVE:   Patient seen for their weekly on treatment evaluation today. Patient is doing well with treatment so far denying any acute complaints. He denies any issues with his bowels. He does have some urinary frequency and a weaker stream but is on Flomax and Proscar. He denies any fatigue. OBJECTIVE:   CHAPERONE: Declined    ECO Asymptomatic    VITAL SIGNS: BP (!) 142/79   Pulse 55   Temp 98 °F (36.7 °C) (Temporal)   Resp 16   Wt 209 lb (94.8 kg)   SpO2 99%   BMI 28.35 kg/m²   Wt Readings from Last 5 Encounters:   23 209 lb (94.8 kg)   22 210 lb 3.2 oz (95.3 kg)   23 210 lb (95.3 kg)   22 202 lb 6.4 oz (91.8 kg)   22 205 lb (93 kg)     GENERAL:  General appearance is that of a well-nourished, well-developed in no apparent distress. LUNGS:  Pulmonary effort normal. Normal breath sounds. EXTREMITIES:  No edema. Normal ROM. NEUROLOGICAL: Alert and oriented. Strength and sensation intact bilaterally. No focal deficits.    PSYCH: Mood normal, behavior normal.  SKIN: No erythema, no desquamation. LABS:  WBC   Date Value Ref Range Status   07/07/2021 4.2 3.5 - 11.3 k/uL Final   08/24/2020 4.3 3.5 - 11.3 k/uL Final   07/30/2019 4.8 3.5 - 11.0 k/uL Final     Segs Absolute   Date Value Ref Range Status   07/07/2021 2.27 1.50 - 8.10 k/uL Final   08/24/2020 2.68 1.50 - 8.10 k/uL Final   07/30/2019 3.00 1.8 - 7.7 k/uL Final     Hemoglobin   Date Value Ref Range Status   07/07/2021 15.5 13.0 - 17.0 g/dL Final   08/24/2020 15.3 13.0 - 17.0 g/dL Final   07/30/2019 15.2 13.5 - 17.5 g/dL Final     Platelets   Date Value Ref Range Status   07/07/2021 196 138 - 453 k/uL Final   08/24/2020 182 138 - 453 k/uL Final   07/30/2019 222 140 - 450 k/uL Final     Creatinine   Date Value Ref Range Status   01/06/2023 1.04 0.70 - 1.20 mg/dL Final   07/05/2022 1.1 0.7 - 1.3 mg/dL Final   02/16/2022 0.97 0.70 - 1.20 mg/dL Final     POC Creatinine   Date Value Ref Range Status   01/06/2023 1.09 0.51 - 1.19 mg/dL Final     No results found for: , CEA  PSA   Date Value Ref Range Status   01/06/2023 2.53 <4.1 ng/mL Final     Comment:     The Roche \"ECLIA\" assay is used. Results obtained with different assay methods cannot be   used interchangeably. 09/01/2022 3.3 0.0 - 4.0 ug/L Final     Comment:           Siemens Immulite 2000 immunometric chemiluminescent assay is used. Results obtained with   different assay methods or instruments cannot be used interchangeably. 08/01/2022 4.75 (H) <4.1 ng/mL Final     Comment:     The Roche \"ECLIA\" assay is used. Results obtained with different assay methods cannot be   used interchangeably. 02/16/2022 3.2 0.0 - 4.0 ug/L Final     Comment:           Siemens Immulite 2000 immunometric chemiluminescent assay is used. Results obtained with   different assay methods or instruments cannot be used interchangeably. 01/10/2022 4.71 (H) <4.1 ug/L Final     Comment:     The Roche \"ECLIA\" assay is used.   Results obtained with different assay methods cannot be   used interchangeably. MEDICATIONS:    Current Outpatient Medications:     finasteride (PROSCAR) 5 MG tablet, Take 5 mg by mouth daily, Disp: , Rfl:     tamsulosin (FLOMAX) 0.4 MG capsule, Take 1 capsule by mouth in the morning and at bedtime TALE 1 CAPSULE ONCE A DAY, Disp: 120 capsule, Rfl: 1    PRIVIGEN 20 GM/200ML SOLN solution, , Disp: , Rfl:     tadalafil (CIALIS) 10 MG tablet, Take 1 tablet by mouth as needed for Erectile Dysfunction, Disp: 30 tablet, Rfl: 3    b complex vitamins capsule, Take 1 capsule by mouth daily. , Disp: , Rfl:     Ascorbic Acid (VITAMIN C) 250 MG tablet, Take 500 mg by mouth daily. , Disp: , Rfl:     Omega-3 Fatty Acids (FISH OIL) 1000 MG CAPS, Take 1,000 mg by mouth daily. , Disp: , Rfl:     Vitamin D (CHOLECALCIFEROL) 1000 UNITS CAPS capsule, Take 1,000 Units by mouth daily. , Disp: , Rfl:     vitamin B-12 (CYANOCOBALAMIN) 1000 MCG tablet, Take 1,000 mcg by mouth daily. , Disp: , Rfl:       ASSESSMENT PLAN:   Treatment setup and plan reviewed. Port images/CBCT images reviewed. Appropriate laboratory work was reviewed. Treatment side effects and toxicities reviewed with the patient, and appropriate management was advised. Will continue radiation treatment as planned, and recommend patient contact us if they have any questions or concerns. Continue monitoring his urinary frequency and if needed medication could be an option. Patient is recommended to try cranberry juice as well. Electronically signed by Tavares Gar MD on 2/1/2023 at 2:13 PM      Drugs Prescribed:  New Prescriptions    No medications on file       Other Orders Placed:  No orders of the defined types were placed in this encounter.

## 2023-02-01 NOTE — PROGRESS NOTES
Oval Erik  2/1/2023  Wt Readings from Last 3 Encounters:   02/01/23 209 lb (94.8 kg)   12/27/22 210 lb 3.2 oz (95.3 kg)   01/06/23 210 lb (95.3 kg)     Body mass index is 28.35 kg/m². Treatment Area:prostate    Patient was seen today for weekly visit. Comfort Alteration    Fatigue: None    Nutritional Alteration  Anorexia: No  Nausea: No  Vomiting: No     Elimination Alterations  Constipation: no  Diarrhea:  no  Urinary Frequency/Urgency: No  Urinary Retention: No  Dysuria: No  Urinary Incontinence: No  Proctitis: No  Nocturia: Yes #/night: 2     Emotional  Coping: effective      Skin Alteration   Sensation:intact    Radiation Dermatitis:  Intact [x]     Erythema  []     Discoloration  []     Rash []     Dry desquamation  []     Moist desquamation []           Injury, potential bleeding or infection: none    Lab Results   Component Value Date    WBC 4.2 07/07/2021     07/07/2021         BP (!) 142/79   Pulse 55   Temp 98 °F (36.7 °C) (Temporal)   Resp 16   Wt 209 lb (94.8 kg)   SpO2 99%   BMI 28.35 kg/m²                   Assessment/Plan: Patient was seen today for weekly visit. Only complaint is slower urinary stream. He is already on flomax. Dr. Tina Thorne notified of assessment and examined patient. Continue current treatment plan.     Enrique Deluca RN

## 2023-02-02 ENCOUNTER — HOSPITAL ENCOUNTER (OUTPATIENT)
Dept: RADIATION ONCOLOGY | Age: 68
Discharge: HOME | End: 2023-02-02
Payer: MEDICARE

## 2023-02-02 DIAGNOSIS — C61 MALIGNANT NEOPLASM OF PROSTATE (HCC): ICD-10-CM

## 2023-02-02 LAB
RAD ONC MSQ ACTUAL FRACTIONS DELIVERED: 4
RAD ONC MSQ ACTUAL SESSION DELIVERED DOSE: 250 CGRAY
RAD ONC MSQ ACTUAL TOTAL DOSE: 1000 CGRAY
RAD ONC MSQ ELAPSED DAYS: 3
RAD ONC MSQ LAST DATE: NORMAL
RAD ONC MSQ PRESCRIBED FRACTIONAL DOSE: 250 CGRAY
RAD ONC MSQ PRESCRIBED NUMBER OF FRACTIONS: 28
RAD ONC MSQ PRESCRIBED TECHNIQUE: NORMAL
RAD ONC MSQ PRESCRIBED TOTAL DOSE: 7000 CGRAY
RAD ONC MSQ START DATE: NORMAL
RAD ONC MSQ TREATMENT COURSE NUMBER: 1
RAD ONC MSQ TREATMENT SITE: NORMAL

## 2023-02-02 PROCEDURE — 77385 HC NTSTY MODUL RAD TX DLVR SMPL: CPT | Performed by: RADIOLOGY

## 2023-02-03 ENCOUNTER — HOSPITAL ENCOUNTER (OUTPATIENT)
Dept: RADIATION ONCOLOGY | Age: 68
Discharge: HOME | End: 2023-02-03
Payer: MEDICARE

## 2023-02-03 DIAGNOSIS — C61 MALIGNANT NEOPLASM OF PROSTATE (HCC): ICD-10-CM

## 2023-02-03 LAB
RAD ONC MSQ ACTUAL FRACTIONS DELIVERED: 5
RAD ONC MSQ ACTUAL SESSION DELIVERED DOSE: 250 CGRAY
RAD ONC MSQ ACTUAL TOTAL DOSE: 1250 CGRAY
RAD ONC MSQ ELAPSED DAYS: 4
RAD ONC MSQ LAST DATE: NORMAL
RAD ONC MSQ PRESCRIBED FRACTIONAL DOSE: 250 CGRAY
RAD ONC MSQ PRESCRIBED NUMBER OF FRACTIONS: 28
RAD ONC MSQ PRESCRIBED TECHNIQUE: NORMAL
RAD ONC MSQ PRESCRIBED TOTAL DOSE: 7000 CGRAY
RAD ONC MSQ START DATE: NORMAL
RAD ONC MSQ TREATMENT COURSE NUMBER: 1
RAD ONC MSQ TREATMENT SITE: NORMAL

## 2023-02-03 PROCEDURE — 77385 HC NTSTY MODUL RAD TX DLVR SMPL: CPT | Performed by: RADIOLOGY

## 2023-02-06 ENCOUNTER — APPOINTMENT (OUTPATIENT)
Dept: RADIATION ONCOLOGY | Age: 68
End: 2023-02-06
Payer: MEDICARE

## 2023-02-06 DIAGNOSIS — C61 MALIGNANT NEOPLASM OF PROSTATE (HCC): ICD-10-CM

## 2023-02-06 LAB
RAD ONC MSQ ACTUAL FRACTIONS DELIVERED: 6
RAD ONC MSQ ACTUAL SESSION DELIVERED DOSE: 250 CGRAY
RAD ONC MSQ ACTUAL TOTAL DOSE: 1500 CGRAY
RAD ONC MSQ ELAPSED DAYS: 7
RAD ONC MSQ LAST DATE: NORMAL
RAD ONC MSQ PRESCRIBED FRACTIONAL DOSE: 250 CGRAY
RAD ONC MSQ PRESCRIBED NUMBER OF FRACTIONS: 28
RAD ONC MSQ PRESCRIBED TECHNIQUE: NORMAL
RAD ONC MSQ PRESCRIBED TOTAL DOSE: 7000 CGRAY
RAD ONC MSQ START DATE: NORMAL
RAD ONC MSQ TREATMENT COURSE NUMBER: 1
RAD ONC MSQ TREATMENT SITE: NORMAL

## 2023-02-06 PROCEDURE — 77385 HC NTSTY MODUL RAD TX DLVR SMPL: CPT | Performed by: RADIOLOGY

## 2023-02-07 ENCOUNTER — APPOINTMENT (OUTPATIENT)
Dept: RADIATION ONCOLOGY | Age: 68
End: 2023-02-07
Payer: MEDICARE

## 2023-02-07 PROCEDURE — 77385 HC NTSTY MODUL RAD TX DLVR SMPL: CPT | Performed by: RADIOLOGY

## 2023-02-08 ENCOUNTER — APPOINTMENT (OUTPATIENT)
Dept: RADIATION ONCOLOGY | Age: 68
End: 2023-02-08
Payer: MEDICARE

## 2023-02-08 VITALS
BODY MASS INDEX: 28.26 KG/M2 | RESPIRATION RATE: 16 BRPM | TEMPERATURE: 97.5 F | DIASTOLIC BLOOD PRESSURE: 73 MMHG | SYSTOLIC BLOOD PRESSURE: 122 MMHG | WEIGHT: 208.4 LBS | HEART RATE: 55 BPM

## 2023-02-08 DIAGNOSIS — C61 MALIGNANT NEOPLASM OF PROSTATE (HCC): ICD-10-CM

## 2023-02-08 LAB
RAD ONC MSQ ACTUAL FRACTIONS DELIVERED: 8
RAD ONC MSQ ACTUAL SESSION DELIVERED DOSE: 250 CGRAY
RAD ONC MSQ ACTUAL TOTAL DOSE: 2000 CGRAY
RAD ONC MSQ ELAPSED DAYS: 9
RAD ONC MSQ LAST DATE: NORMAL
RAD ONC MSQ PRESCRIBED FRACTIONAL DOSE: 250 CGRAY
RAD ONC MSQ PRESCRIBED NUMBER OF FRACTIONS: 28
RAD ONC MSQ PRESCRIBED TECHNIQUE: NORMAL
RAD ONC MSQ PRESCRIBED TOTAL DOSE: 7000 CGRAY
RAD ONC MSQ START DATE: NORMAL
RAD ONC MSQ TREATMENT COURSE NUMBER: 1
RAD ONC MSQ TREATMENT SITE: NORMAL

## 2023-02-08 PROCEDURE — 77385 HC NTSTY MODUL RAD TX DLVR SMPL: CPT | Performed by: RADIOLOGY

## 2023-02-08 PROCEDURE — 77336 RADIATION PHYSICS CONSULT: CPT | Performed by: RADIOLOGY

## 2023-02-08 ASSESSMENT — PAIN SCALES - GENERAL: PAINLEVEL_OUTOF10: 0

## 2023-02-08 NOTE — PROGRESS NOTES
Aristides Rifricardo  2/8/2023  Wt Readings from Last 3 Encounters:   02/08/23 208 lb 6.4 oz (94.5 kg)   02/01/23 209 lb (94.8 kg)   12/27/22 210 lb 3.2 oz (95.3 kg)     Body mass index is 28.26 kg/m². Treatment Area:prostate    Patient was seen today for weekly visit. Comfort Alteration    Fatigue: None    Nutritional Alteration  Anorexia: No  Nausea: No  Vomiting: No     Elimination Alterations  Constipation: no  Diarrhea:  no  Urinary Frequency/Urgency: Yes  Urinary Retention: No  Dysuria: Yes  Urinary Incontinence: No  Proctitis: No  Nocturia: Yes #/night: 2     Emotional  Coping: effective      Skin Alteration   Sensation:intact    Radiation Dermatitis:  Intact [x]     Erythema  []     Discoloration  []     Rash []     Dry desquamation  []     Moist desquamation []           Injury, potential bleeding or infection: none    Lab Results   Component Value Date    WBC 4.2 07/07/2021     07/07/2021         /73   Pulse 55   Temp 97.5 °F (36.4 °C) (Temporal)   Resp 16   Wt 208 lb 6.4 oz (94.5 kg)   BMI 28.26 kg/m²      Pain Assessment: 0-10  Pain Level: 0         Assessment/Plan: Patient was seen today for weekly visit. Slower stream, burning with urination, and urinary frequency are reported by patient. Taking Flomax daily. States symptoms are manageable.       Jamir Montaño RN

## 2023-02-08 NOTE — PROGRESS NOTES
[facility]       Radiation Oncology          [address]          [place] [address]        Elvis Hernán: [phone]        F: [phone]       [email or web address]             RADIATION ONCOLOGY WEEKLY PROGRESS NOTE  Patient ID:   Shawn Emanuel  : 1955   MRN: [id number]    Location:  [facility] Radiation Oncology,   [address] [place]   [phone]    DIAGNOSIS:  Cancer Staging  Prostate cancer [facility])  Staging form: Prostate, AJCC 8th Edition  - Clinical stage from 2022: Stage IIB (cT1c, cN0, cM0, PSA: 4.8, Grade Group: 2) - Signed by Vernell Nelson MD on 2022      TREATMENT DETAILS:  Treatment Site: ProstateSV  Actual Dose: 2000cGy  Total Planned Dose: 7000cGy  Treatment Technique: IMRT  Fraction Technique: Daily  Therapy imaging monitoring: CBCT daily  Concurrent Chemotherapy: NA    SUBJECTIVE:   Patient seen for their weekly on treatment evaluation today. Patient is doing well with treatment so far denying any acute complaints. He denies any issues with his bowels. He does have some urinary frequency and a weaker stream but is on Flomax and Proscar. He does have some dysuria and is using cranberry juice. He denies any fatigue. OBJECTIVE:   CHAPERONE: Declined    ECO Asymptomatic    VITAL SIGNS: /73   Pulse 55   Temp 97.5 °F (36.4 °C) (Temporal)   Resp 16   Wt 208 lb 6.4 oz (94.5 kg)   BMI 28.26 kg/m²   Wt Readings from Last 5 Encounters:   23 208 lb 6.4 oz (94.5 kg)   23 209 lb (94.8 kg)   22 210 lb 3.2 oz (95.3 kg)   23 210 lb (95.3 kg)   22 202 lb 6.4 oz (91.8 kg)     GENERAL:  General appearance is that of a well-nourished, well-developed in no apparent distress. LUNGS:  Pulmonary effort normal. Normal breath sounds. EXTREMITIES:  No edema. Normal ROM. NEUROLOGICAL: Alert and oriented. Strength and sensation intact bilaterally. No focal deficits.    PSYCH: Mood normal, behavior normal.  SKIN: No erythema, no desquamation. LABS:  WBC   Date Value Ref Range Status   07/07/2021 4.2 3.5 - 11.3 k/uL Final   08/24/2020 4.3 3.5 - 11.3 k/uL Final   07/30/2019 4.8 3.5 - 11.0 k/uL Final     Segs Absolute   Date Value Ref Range Status   07/07/2021 2.27 1.50 - 8.10 k/uL Final   08/24/2020 2.68 1.50 - 8.10 k/uL Final   07/30/2019 3.00 1.8 - 7.7 k/uL Final     Hemoglobin   Date Value Ref Range Status   07/07/2021 15.5 13.0 - 17.0 g/dL Final   08/24/2020 15.3 13.0 - 17.0 g/dL Final   07/30/2019 15.2 13.5 - 17.5 g/dL Final     Platelets   Date Value Ref Range Status   07/07/2021 196 138 - 453 k/uL Final   08/24/2020 182 138 - 453 k/uL Final   07/30/2019 222 140 - 450 k/uL Final     Creatinine   Date Value Ref Range Status   01/06/2023 1.04 0.70 - 1.20 mg/dL Final   07/05/2022 1.1 0.7 - 1.3 mg/dL Final   02/16/2022 0.97 0.70 - 1.20 mg/dL Final     POC Creatinine   Date Value Ref Range Status   01/06/2023 1.09 0.51 - 1.19 mg/dL Final     No results found for: , CEA  PSA   Date Value Ref Range Status   01/06/2023 2.53 <4.1 ng/mL Final     Comment:     The Roche \"ECLIA\" assay is used. Results obtained with different assay methods cannot be   used interchangeably. 09/01/2022 3.3 0.0 - 4.0 ug/L Final     Comment:           Siemens Immulite 2000 immunometric chemiluminescent assay is used. Results obtained with   different assay methods or instruments cannot be used interchangeably. 08/01/2022 4.75 (H) <4.1 ng/mL Final     Comment:     The Roche \"ECLIA\" assay is used. Results obtained with different assay methods cannot be   used interchangeably. 02/16/2022 3.2 0.0 - 4.0 ug/L Final     Comment:           Siemens Immulite 2000 immunometric chemiluminescent assay is used. Results obtained with   different assay methods or instruments cannot be used interchangeably. 01/10/2022 4.71 (H) <4.1 ug/L Final     Comment:     The Roche \"ECLIA\" assay is used.   Results obtained with different assay methods cannot be   used interchangeably. MEDICATIONS:    Current Outpatient Medications:     finasteride (PROSCAR) 5 MG tablet, Take 5 mg by mouth daily, Disp: , Rfl:     tamsulosin (FLOMAX) 0.4 MG capsule, Take 1 capsule by mouth in the morning and at bedtime TALE 1 CAPSULE ONCE A DAY, Disp: 120 capsule, Rfl: 1    PRIVIGEN 20 GM/200ML SOLN solution, , Disp: , Rfl:     tadalafil (CIALIS) 10 MG tablet, Take 1 tablet by mouth as needed for Erectile Dysfunction, Disp: 30 tablet, Rfl: 3    b complex vitamins capsule, Take 1 capsule by mouth daily. , Disp: , Rfl:     Ascorbic Acid (VITAMIN C) 250 MG tablet, Take 500 mg by mouth daily. , Disp: , Rfl:     Omega-3 Fatty Acids (FISH OIL) 1000 MG CAPS, Take 1,000 mg by mouth daily. , Disp: , Rfl:     Vitamin D (CHOLECALCIFEROL) 1000 UNITS CAPS capsule, Take 1,000 Units by mouth daily. , Disp: , Rfl:     vitamin B-12 (CYANOCOBALAMIN) 1000 MCG tablet, Take 1,000 mcg by mouth daily. , Disp: , Rfl:       ASSESSMENT PLAN:   Treatment setup and plan reviewed. Port images/CBCT images reviewed. Appropriate laboratory work was reviewed. Treatment side effects and toxicities reviewed with the patient, and appropriate management was advised. Will continue radiation treatment as planned, and recommend patient contact us if they have any questions or concerns. Continue monitoring his urinary frequency and if needed medication could be an option. Patient is recommended to try Azo Pyridium OTC for dysuria. Electronically signed by Tera Quinteros MD on 2/8/2023 at 3:14 PM      Drugs Prescribed:  New Prescriptions    No medications on file       Other Orders Placed:  No orders of the defined types were placed in this encounter.

## 2023-02-09 ENCOUNTER — APPOINTMENT (OUTPATIENT)
Dept: RADIATION ONCOLOGY | Age: 68
End: 2023-02-09
Payer: MEDICARE

## 2023-02-09 DIAGNOSIS — C61 MALIGNANT NEOPLASM OF PROSTATE (HCC): ICD-10-CM

## 2023-02-09 LAB
RAD ONC MSQ ACTUAL FRACTIONS DELIVERED: 9
RAD ONC MSQ ACTUAL SESSION DELIVERED DOSE: 250 CGRAY
RAD ONC MSQ ACTUAL TOTAL DOSE: 2250 CGRAY
RAD ONC MSQ ELAPSED DAYS: 10
RAD ONC MSQ LAST DATE: NORMAL
RAD ONC MSQ PRESCRIBED FRACTIONAL DOSE: 250 CGRAY
RAD ONC MSQ PRESCRIBED NUMBER OF FRACTIONS: 28
RAD ONC MSQ PRESCRIBED TECHNIQUE: NORMAL
RAD ONC MSQ PRESCRIBED TOTAL DOSE: 7000 CGRAY
RAD ONC MSQ START DATE: NORMAL
RAD ONC MSQ TREATMENT COURSE NUMBER: 1
RAD ONC MSQ TREATMENT SITE: NORMAL

## 2023-02-09 PROCEDURE — 77385 HC NTSTY MODUL RAD TX DLVR SMPL: CPT | Performed by: RADIOLOGY

## 2023-02-10 ENCOUNTER — APPOINTMENT (OUTPATIENT)
Dept: RADIATION ONCOLOGY | Age: 68
End: 2023-02-10
Payer: MEDICARE

## 2023-02-10 PROCEDURE — 77385 HC NTSTY MODUL RAD TX DLVR SMPL: CPT | Performed by: RADIOLOGY

## 2023-02-13 ENCOUNTER — APPOINTMENT (OUTPATIENT)
Dept: RADIATION ONCOLOGY | Age: 68
End: 2023-02-13
Payer: MEDICARE

## 2023-02-13 DIAGNOSIS — C61 MALIGNANT NEOPLASM OF PROSTATE (HCC): ICD-10-CM

## 2023-02-13 LAB
RAD ONC MSQ ACTUAL FRACTIONS DELIVERED: 11
RAD ONC MSQ ACTUAL SESSION DELIVERED DOSE: 250 CGRAY
RAD ONC MSQ ACTUAL TOTAL DOSE: 2750 CGRAY
RAD ONC MSQ ELAPSED DAYS: 14
RAD ONC MSQ LAST DATE: NORMAL
RAD ONC MSQ PRESCRIBED FRACTIONAL DOSE: 250 CGRAY
RAD ONC MSQ PRESCRIBED NUMBER OF FRACTIONS: 28
RAD ONC MSQ PRESCRIBED TECHNIQUE: NORMAL
RAD ONC MSQ PRESCRIBED TOTAL DOSE: 7000 CGRAY
RAD ONC MSQ START DATE: NORMAL
RAD ONC MSQ TREATMENT COURSE NUMBER: 1
RAD ONC MSQ TREATMENT SITE: NORMAL

## 2023-02-13 PROCEDURE — 77385 HC NTSTY MODUL RAD TX DLVR SMPL: CPT | Performed by: RADIOLOGY

## 2023-02-14 ENCOUNTER — APPOINTMENT (OUTPATIENT)
Dept: RADIATION ONCOLOGY | Age: 68
End: 2023-02-14
Payer: MEDICARE

## 2023-02-14 PROCEDURE — 77385 HC NTSTY MODUL RAD TX DLVR SMPL: CPT | Performed by: RADIOLOGY

## 2023-02-15 ENCOUNTER — APPOINTMENT (OUTPATIENT)
Dept: RADIATION ONCOLOGY | Age: 68
End: 2023-02-15
Payer: MEDICARE

## 2023-02-15 VITALS
DIASTOLIC BLOOD PRESSURE: 73 MMHG | BODY MASS INDEX: 28.21 KG/M2 | RESPIRATION RATE: 16 BRPM | TEMPERATURE: 97.9 F | HEART RATE: 80 BPM | WEIGHT: 208 LBS | SYSTOLIC BLOOD PRESSURE: 123 MMHG

## 2023-02-15 DIAGNOSIS — C61 MALIGNANT NEOPLASM OF PROSTATE (HCC): ICD-10-CM

## 2023-02-15 LAB
RAD ONC MSQ ACTUAL FRACTIONS DELIVERED: 13
RAD ONC MSQ ACTUAL SESSION DELIVERED DOSE: 250 CGRAY
RAD ONC MSQ ACTUAL TOTAL DOSE: 3250 CGRAY
RAD ONC MSQ ELAPSED DAYS: 16
RAD ONC MSQ LAST DATE: NORMAL
RAD ONC MSQ PRESCRIBED FRACTIONAL DOSE: 250 CGRAY
RAD ONC MSQ PRESCRIBED NUMBER OF FRACTIONS: 28
RAD ONC MSQ PRESCRIBED TECHNIQUE: NORMAL
RAD ONC MSQ PRESCRIBED TOTAL DOSE: 7000 CGRAY
RAD ONC MSQ START DATE: NORMAL
RAD ONC MSQ TREATMENT COURSE NUMBER: 1
RAD ONC MSQ TREATMENT SITE: NORMAL

## 2023-02-15 PROCEDURE — 77385 HC NTSTY MODUL RAD TX DLVR SMPL: CPT | Performed by: RADIOLOGY

## 2023-02-15 PROCEDURE — 77336 RADIATION PHYSICS CONSULT: CPT | Performed by: RADIOLOGY

## 2023-02-15 ASSESSMENT — PAIN SCALES - GENERAL: PAINLEVEL_OUTOF10: 0

## 2023-02-15 NOTE — PROGRESS NOTES
[facility]       Radiation Oncology          [address]          [place]        Dasia Goo: [phone]        F: [phone]       [email or web address]             RADIATION ONCOLOGY WEEKLY PROGRESS NOTE  Patient ID:   Didier Young  : 1955   MRN: [id number]    Location:  [facility] Radiation Oncology,   [address] [facility] [place]   [phone]    DIAGNOSIS:  Cancer Staging  Prostate cancer [facility])  Staging form: Prostate, AJCC 8th Edition  - Clinical stage from 2022: Stage IIB (cT1c, cN0, cM0, PSA: 4.8, Grade Group: 2) - Signed by Paco Mcintosh MD on 2022      TREATMENT DETAILS:  Treatment Site: ProstateSV  Actual Dose: 3250cGy  Total Planned Dose: 7000cGy  Treatment Technique: IMRT  Fraction Technique: Daily  Therapy imaging monitoring: CBCT daily  Concurrent Chemotherapy: NA    SUBJECTIVE:   Patient seen for their weekly on treatment evaluation today. Patient is doing well with treatment so far denying any acute complaints. He denies any issues with his bowels. He does have some urinary frequency and a weaker stream but is on Flomax and Proscar. He is using Azo which helps with his dysuria. He denies any fatigue. OBJECTIVE:   CHAPERONE: Declined    ECO Asymptomatic    VITAL SIGNS: /73   Pulse 80   Temp 97.9 °F (36.6 °C) (Temporal)   Resp 16   Wt 208 lb (94.3 kg)   BMI 28.21 kg/m²   Wt Readings from Last 5 Encounters:   02/15/23 208 lb (94.3 kg)   23 208 lb 6.4 oz (94.5 kg)   23 209 lb (94.8 kg)   22 210 lb 3.2 oz (95.3 kg)   23 210 lb (95.3 kg)     GENERAL:  General appearance is that of a well-nourished, well-developed in no apparent distress. LUNGS:  Pulmonary effort normal. Normal breath sounds. EXTREMITIES:  No edema. Normal ROM. NEUROLOGICAL: Alert and oriented. Strength and sensation intact bilaterally. No focal deficits.    PSYCH: Mood normal, behavior normal.  SKIN: No erythema, no desquamation. LABS:  WBC   Date Value Ref Range Status   07/07/2021 4.2 3.5 - 11.3 k/uL Final   08/24/2020 4.3 3.5 - 11.3 k/uL Final   07/30/2019 4.8 3.5 - 11.0 k/uL Final     Segs Absolute   Date Value Ref Range Status   07/07/2021 2.27 1.50 - 8.10 k/uL Final   08/24/2020 2.68 1.50 - 8.10 k/uL Final   07/30/2019 3.00 1.8 - 7.7 k/uL Final     Hemoglobin   Date Value Ref Range Status   07/07/2021 15.5 13.0 - 17.0 g/dL Final   08/24/2020 15.3 13.0 - 17.0 g/dL Final   07/30/2019 15.2 13.5 - 17.5 g/dL Final     Platelets   Date Value Ref Range Status   07/07/2021 196 138 - 453 k/uL Final   08/24/2020 182 138 - 453 k/uL Final   07/30/2019 222 140 - 450 k/uL Final     Creatinine   Date Value Ref Range Status   01/06/2023 1.04 0.70 - 1.20 mg/dL Final   07/05/2022 1.1 0.7 - 1.3 mg/dL Final   02/16/2022 0.97 0.70 - 1.20 mg/dL Final     POC Creatinine   Date Value Ref Range Status   01/06/2023 1.09 0.51 - 1.19 mg/dL Final     No results found for: , CEA  PSA   Date Value Ref Range Status   01/06/2023 2.53 <4.1 ng/mL Final     Comment:     The Roche \"ECLIA\" assay is used. Results obtained with different assay methods cannot be   used interchangeably. 09/01/2022 3.3 0.0 - 4.0 ug/L Final     Comment:           Siemens Immulite 2000 immunometric chemiluminescent assay is used. Results obtained with   different assay methods or instruments cannot be used interchangeably. 08/01/2022 4.75 (H) <4.1 ng/mL Final     Comment:     The Roche \"ECLIA\" assay is used. Results obtained with different assay methods cannot be   used interchangeably. 02/16/2022 3.2 0.0 - 4.0 ug/L Final     Comment:           Siemens Immulite 2000 immunometric chemiluminescent assay is used. Results obtained with   different assay methods or instruments cannot be used interchangeably. 01/10/2022 4.71 (H) <4.1 ug/L Final     Comment:     The Roche \"ECLIA\" assay is used.   Results obtained with different assay methods cannot be   used interchangeably. MEDICATIONS:    Current Outpatient Medications:     finasteride (PROSCAR) 5 MG tablet, Take 5 mg by mouth daily, Disp: , Rfl:     tamsulosin (FLOMAX) 0.4 MG capsule, Take 1 capsule by mouth in the morning and at bedtime TALE 1 CAPSULE ONCE A DAY, Disp: 120 capsule, Rfl: 1    PRIVIGEN 20 GM/200ML SOLN solution, , Disp: , Rfl:     tadalafil (CIALIS) 10 MG tablet, Take 1 tablet by mouth as needed for Erectile Dysfunction, Disp: 30 tablet, Rfl: 3    b complex vitamins capsule, Take 1 capsule by mouth daily. , Disp: , Rfl:     Ascorbic Acid (VITAMIN C) 250 MG tablet, Take 500 mg by mouth daily. , Disp: , Rfl:     Omega-3 Fatty Acids (FISH OIL) 1000 MG CAPS, Take 1,000 mg by mouth daily. , Disp: , Rfl:     Vitamin D (CHOLECALCIFEROL) 1000 UNITS CAPS capsule, Take 1,000 Units by mouth daily. , Disp: , Rfl:     vitamin B-12 (CYANOCOBALAMIN) 1000 MCG tablet, Take 1,000 mcg by mouth daily. , Disp: , Rfl:       ASSESSMENT PLAN:   Treatment setup and plan reviewed. Port images/CBCT images reviewed. Appropriate laboratory work was reviewed. Treatment side effects and toxicities reviewed with the patient, and appropriate management was advised. Will continue radiation treatment as planned, and recommend patient contact us if they have any questions or concerns. Continue monitoring his urinary frequency and if needed medication could be an option. Patient is recommended to continue using Azo as needed. Electronically signed by Dinah Osborn MD on 2/15/2023 at 2:14 PM      Drugs Prescribed:  New Prescriptions    No medications on file       Other Orders Placed:  No orders of the defined types were placed in this encounter.

## 2023-02-15 NOTE — PROGRESS NOTES
Jaylyn Hassan  2/15/2023  Wt Readings from Last 3 Encounters:   02/15/23 208 lb (94.3 kg)   02/08/23 208 lb 6.4 oz (94.5 kg)   02/01/23 209 lb (94.8 kg)     Body mass index is 28.21 kg/m². Treatment Area:prostate    Patient was seen today for weekly visit. Comfort Alteration    Fatigue: None    Nutritional Alteration  Anorexia: No  Nausea: No  Vomiting: No     Elimination Alterations  Constipation: no  Diarrhea:  no  Urinary Frequency/Urgency: Yes  Urinary Retention: No  Dysuria: Some (improved with Azo)  Urinary Incontinence: No  Proctitis: No  Nocturia: Yes #/night: 2     Emotional  Coping: effective      Skin Alteration   Sensation:intact    Radiation Dermatitis:  Intact [x]     Erythema  []     Discoloration  []     Rash []     Dry desquamation  []     Moist desquamation []           Injury, potential bleeding or infection: none    Lab Results   Component Value Date    WBC 4.2 07/07/2021     07/07/2021         /73   Pulse 80   Temp 97.9 °F (36.6 °C) (Temporal)   Resp 16   Wt 208 lb (94.3 kg)   BMI 28.21 kg/m²      Pain Assessment: 0-10  Pain Level: 0         Assessment/Plan: Patient was seen today for weekly visit. Started on Azo last week and states this is helping his burning with urination. Denies other concerns.   Tona Mariee RN

## 2023-02-16 ENCOUNTER — APPOINTMENT (OUTPATIENT)
Dept: RADIATION ONCOLOGY | Age: 68
End: 2023-02-16
Payer: MEDICARE

## 2023-02-16 DIAGNOSIS — C61 MALIGNANT NEOPLASM OF PROSTATE (HCC): ICD-10-CM

## 2023-02-16 LAB
RAD ONC MSQ ACTUAL FRACTIONS DELIVERED: 14
RAD ONC MSQ ACTUAL SESSION DELIVERED DOSE: 250 CGRAY
RAD ONC MSQ ACTUAL TOTAL DOSE: 3500 CGRAY
RAD ONC MSQ ELAPSED DAYS: 17
RAD ONC MSQ LAST DATE: NORMAL
RAD ONC MSQ PRESCRIBED FRACTIONAL DOSE: 250 CGRAY
RAD ONC MSQ PRESCRIBED NUMBER OF FRACTIONS: 28
RAD ONC MSQ PRESCRIBED TECHNIQUE: NORMAL
RAD ONC MSQ PRESCRIBED TOTAL DOSE: 7000 CGRAY
RAD ONC MSQ START DATE: NORMAL
RAD ONC MSQ TREATMENT COURSE NUMBER: 1
RAD ONC MSQ TREATMENT SITE: NORMAL

## 2023-02-16 PROCEDURE — 77385 HC NTSTY MODUL RAD TX DLVR SMPL: CPT | Performed by: RADIOLOGY

## 2023-02-17 ENCOUNTER — APPOINTMENT (OUTPATIENT)
Dept: RADIATION ONCOLOGY | Age: 68
End: 2023-02-17
Payer: MEDICARE

## 2023-02-17 DIAGNOSIS — C61 MALIGNANT NEOPLASM OF PROSTATE (HCC): ICD-10-CM

## 2023-02-17 LAB
RAD ONC MSQ ACTUAL FRACTIONS DELIVERED: 15
RAD ONC MSQ ACTUAL SESSION DELIVERED DOSE: 250 CGRAY
RAD ONC MSQ ACTUAL TOTAL DOSE: 3750 CGRAY
RAD ONC MSQ ELAPSED DAYS: 18
RAD ONC MSQ LAST DATE: NORMAL
RAD ONC MSQ PRESCRIBED FRACTIONAL DOSE: 250 CGRAY
RAD ONC MSQ PRESCRIBED NUMBER OF FRACTIONS: 28
RAD ONC MSQ PRESCRIBED TECHNIQUE: NORMAL
RAD ONC MSQ PRESCRIBED TOTAL DOSE: 7000 CGRAY
RAD ONC MSQ START DATE: NORMAL
RAD ONC MSQ TREATMENT COURSE NUMBER: 1
RAD ONC MSQ TREATMENT SITE: NORMAL

## 2023-02-17 PROCEDURE — 77385 HC NTSTY MODUL RAD TX DLVR SMPL: CPT | Performed by: RADIOLOGY

## 2023-02-20 ENCOUNTER — APPOINTMENT (OUTPATIENT)
Dept: RADIATION ONCOLOGY | Age: 68
End: 2023-02-20
Payer: MEDICARE

## 2023-02-20 DIAGNOSIS — C61 MALIGNANT NEOPLASM OF PROSTATE (HCC): ICD-10-CM

## 2023-02-20 LAB
RAD ONC MSQ ACTUAL FRACTIONS DELIVERED: 16
RAD ONC MSQ ACTUAL SESSION DELIVERED DOSE: 250 CGRAY
RAD ONC MSQ ACTUAL TOTAL DOSE: 4000 CGRAY
RAD ONC MSQ ELAPSED DAYS: 21
RAD ONC MSQ LAST DATE: NORMAL
RAD ONC MSQ PRESCRIBED FRACTIONAL DOSE: 250 CGRAY
RAD ONC MSQ PRESCRIBED NUMBER OF FRACTIONS: 28
RAD ONC MSQ PRESCRIBED TECHNIQUE: NORMAL
RAD ONC MSQ PRESCRIBED TOTAL DOSE: 7000 CGRAY
RAD ONC MSQ START DATE: NORMAL
RAD ONC MSQ TREATMENT COURSE NUMBER: 1
RAD ONC MSQ TREATMENT SITE: NORMAL

## 2023-02-20 PROCEDURE — 77385 HC NTSTY MODUL RAD TX DLVR SMPL: CPT | Performed by: RADIOLOGY

## 2023-02-21 ENCOUNTER — APPOINTMENT (OUTPATIENT)
Dept: RADIATION ONCOLOGY | Age: 68
End: 2023-02-21
Payer: MEDICARE

## 2023-02-21 DIAGNOSIS — C61 MALIGNANT NEOPLASM OF PROSTATE (HCC): ICD-10-CM

## 2023-02-21 LAB
RAD ONC MSQ ACTUAL FRACTIONS DELIVERED: 17
RAD ONC MSQ ACTUAL SESSION DELIVERED DOSE: 250 CGRAY
RAD ONC MSQ ACTUAL TOTAL DOSE: 4250 CGRAY
RAD ONC MSQ ELAPSED DAYS: 22
RAD ONC MSQ LAST DATE: NORMAL
RAD ONC MSQ PRESCRIBED FRACTIONAL DOSE: 250 CGRAY
RAD ONC MSQ PRESCRIBED NUMBER OF FRACTIONS: 28
RAD ONC MSQ PRESCRIBED TECHNIQUE: NORMAL
RAD ONC MSQ PRESCRIBED TOTAL DOSE: 7000 CGRAY
RAD ONC MSQ START DATE: NORMAL
RAD ONC MSQ TREATMENT COURSE NUMBER: 1
RAD ONC MSQ TREATMENT SITE: NORMAL

## 2023-02-21 PROCEDURE — 77385 HC NTSTY MODUL RAD TX DLVR SMPL: CPT | Performed by: RADIOLOGY

## 2023-02-22 ENCOUNTER — APPOINTMENT (OUTPATIENT)
Dept: RADIATION ONCOLOGY | Age: 68
End: 2023-02-22
Payer: MEDICARE

## 2023-02-22 VITALS
TEMPERATURE: 98 F | WEIGHT: 208 LBS | SYSTOLIC BLOOD PRESSURE: 121 MMHG | OXYGEN SATURATION: 99 % | BODY MASS INDEX: 28.21 KG/M2 | DIASTOLIC BLOOD PRESSURE: 66 MMHG | HEART RATE: 55 BPM | RESPIRATION RATE: 16 BRPM

## 2023-02-22 DIAGNOSIS — C61 MALIGNANT NEOPLASM OF PROSTATE (HCC): ICD-10-CM

## 2023-02-22 LAB
RAD ONC MSQ ACTUAL FRACTIONS DELIVERED: 18
RAD ONC MSQ ACTUAL SESSION DELIVERED DOSE: 250 CGRAY
RAD ONC MSQ ACTUAL TOTAL DOSE: 4500 CGRAY
RAD ONC MSQ ELAPSED DAYS: 23
RAD ONC MSQ LAST DATE: NORMAL
RAD ONC MSQ PRESCRIBED FRACTIONAL DOSE: 250 CGRAY
RAD ONC MSQ PRESCRIBED NUMBER OF FRACTIONS: 28
RAD ONC MSQ PRESCRIBED TECHNIQUE: NORMAL
RAD ONC MSQ PRESCRIBED TOTAL DOSE: 7000 CGRAY
RAD ONC MSQ START DATE: NORMAL
RAD ONC MSQ TREATMENT COURSE NUMBER: 1
RAD ONC MSQ TREATMENT SITE: NORMAL

## 2023-02-22 PROCEDURE — 77385 HC NTSTY MODUL RAD TX DLVR SMPL: CPT | Performed by: RADIOLOGY

## 2023-02-22 PROCEDURE — 77336 RADIATION PHYSICS CONSULT: CPT | Performed by: RADIOLOGY

## 2023-02-22 NOTE — PROGRESS NOTES
[facility]       Radiation Oncology          [address] [facility] [address]          [place]        Ange[place] Fara: [phone]        F: [phone]       [email or web address]             RADIATION ONCOLOGY WEEKLY PROGRESS NOTE  Patient ID:   Basilia Torres  : 1955   MRN: [id number]    Location:  [facility] Radiation Oncology,   [address], [place]   [phone]    DIAGNOSIS:  Cancer Staging  Prostate cancer [facility])  Staging form: Prostate, AJCC 8th Edition  - Clinical stage from 2022: Stage IIB (cT1c, cN0, cM0, PSA: 4.8, Grade Group: 2) - Signed by Josh Roblero MD on 2022      TREATMENT DETAILS:  Treatment Site: ProstateSV  Actual Dose: 4500cGy  Total Planned Dose: 7000cGy  Treatment Technique: IMRT  Fraction Technique: Daily  Therapy imaging monitoring: CBCT daily  Concurrent Chemotherapy: NA    SUBJECTIVE:   Patient seen for their weekly on treatment evaluation today. Patient is doing well with treatment so far denying any acute complaints. He denies any issues with his bowels. He does have some urinary frequency and a weaker stream but is on Flomax and Proscar. He is using Azo which helps with his dysuria. He denies any fatigue. OBJECTIVE:   CHAPERONE: Declined    ECO Asymptomatic    VITAL SIGNS: /66   Pulse 55   Temp 98 °F (36.7 °C) (Temporal)   Resp 16   Wt 208 lb (94.3 kg)   SpO2 99%   BMI 28.21 kg/m²   Wt Readings from Last 5 Encounters:   23 208 lb (94.3 kg)   02/15/23 208 lb (94.3 kg)   23 208 lb 6.4 oz (94.5 kg)   23 209 lb (94.8 kg)   22 210 lb 3.2 oz (95.3 kg)     GENERAL:  General appearance is that of a well-nourished, well-developed in no apparent distress. LUNGS:  Pulmonary effort normal. Normal breath sounds. EXTREMITIES:  No edema. Normal ROM. NEUROLOGICAL: Alert and oriented. Strength and sensation intact bilaterally. No focal deficits.    PSYCH: Mood normal, behavior normal.  SKIN: No erythema, no desquamation. LABS:  WBC   Date Value Ref Range Status   07/07/2021 4.2 3.5 - 11.3 k/uL Final   08/24/2020 4.3 3.5 - 11.3 k/uL Final   07/30/2019 4.8 3.5 - 11.0 k/uL Final     Segs Absolute   Date Value Ref Range Status   07/07/2021 2.27 1.50 - 8.10 k/uL Final   08/24/2020 2.68 1.50 - 8.10 k/uL Final   07/30/2019 3.00 1.8 - 7.7 k/uL Final     Hemoglobin   Date Value Ref Range Status   07/07/2021 15.5 13.0 - 17.0 g/dL Final   08/24/2020 15.3 13.0 - 17.0 g/dL Final   07/30/2019 15.2 13.5 - 17.5 g/dL Final     Platelets   Date Value Ref Range Status   07/07/2021 196 138 - 453 k/uL Final   08/24/2020 182 138 - 453 k/uL Final   07/30/2019 222 140 - 450 k/uL Final     Creatinine   Date Value Ref Range Status   01/06/2023 1.04 0.70 - 1.20 mg/dL Final   07/05/2022 1.1 0.7 - 1.3 mg/dL Final   02/16/2022 0.97 0.70 - 1.20 mg/dL Final     POC Creatinine   Date Value Ref Range Status   01/06/2023 1.09 0.51 - 1.19 mg/dL Final     No results found for: , CEA  PSA   Date Value Ref Range Status   01/06/2023 2.53 <4.1 ng/mL Final     Comment:     The Roche \"ECLIA\" assay is used. Results obtained with different assay methods cannot be   used interchangeably. 09/01/2022 3.3 0.0 - 4.0 ug/L Final     Comment:           Siemens Immulite 2000 immunometric chemiluminescent assay is used. Results obtained with   different assay methods or instruments cannot be used interchangeably. 08/01/2022 4.75 (H) <4.1 ng/mL Final     Comment:     The Roche \"ECLIA\" assay is used. Results obtained with different assay methods cannot be   used interchangeably. 02/16/2022 3.2 0.0 - 4.0 ug/L Final     Comment:           Siemens Immulite 2000 immunometric chemiluminescent assay is used. Results obtained with   different assay methods or instruments cannot be used interchangeably. 01/10/2022 4.71 (H) <4.1 ug/L Final     Comment:     The Roche \"ECLIA\" assay is used.   Results obtained with different assay methods cannot be   used interchangeably. MEDICATIONS:    Current Outpatient Medications:     finasteride (PROSCAR) 5 MG tablet, Take 5 mg by mouth daily, Disp: , Rfl:     tamsulosin (FLOMAX) 0.4 MG capsule, Take 1 capsule by mouth in the morning and at bedtime TALE 1 CAPSULE ONCE A DAY, Disp: 120 capsule, Rfl: 1    PRIVIGEN 20 GM/200ML SOLN solution, , Disp: , Rfl:     tadalafil (CIALIS) 10 MG tablet, Take 1 tablet by mouth as needed for Erectile Dysfunction, Disp: 30 tablet, Rfl: 3    b complex vitamins capsule, Take 1 capsule by mouth daily. , Disp: , Rfl:     Ascorbic Acid (VITAMIN C) 250 MG tablet, Take 500 mg by mouth daily. , Disp: , Rfl:     Omega-3 Fatty Acids (FISH OIL) 1000 MG CAPS, Take 1,000 mg by mouth daily. , Disp: , Rfl:     Vitamin D (CHOLECALCIFEROL) 1000 UNITS CAPS capsule, Take 1,000 Units by mouth daily. , Disp: , Rfl:     vitamin B-12 (CYANOCOBALAMIN) 1000 MCG tablet, Take 1,000 mcg by mouth daily. , Disp: , Rfl:       ASSESSMENT PLAN:   Treatment setup and plan reviewed. Port images/CBCT images reviewed. Appropriate laboratory work was reviewed. Treatment side effects and toxicities reviewed with the patient, and appropriate management was advised. Will continue radiation treatment as planned, and recommend patient contact us if they have any questions or concerns. Continue monitoring his urinary frequency and if needed medication could be an option. Patient is recommended to continue using Azo and Finasteride. Electronically signed by La Nena Walter MD on 2/22/2023 at 3:24 PM      Drugs Prescribed:  New Prescriptions    No medications on file       Other Orders Placed:  No orders of the defined types were placed in this encounter.

## 2023-02-22 NOTE — PROGRESS NOTES
Magaly Aden  2/22/2023  Wt Readings from Last 3 Encounters:   02/22/23 208 lb (94.3 kg)   02/15/23 208 lb (94.3 kg)   02/08/23 208 lb 6.4 oz (94.5 kg)     Body mass index is 28.21 kg/m². Treatment Area:prostate    Patient was seen today for weekly visit. Comfort Alteration    Fatigue: None    Nutritional Alteration  Anorexia: No  Nausea: No  Vomiting: No     Elimination Alterations  Constipation: no  Diarrhea:  no  Urinary Frequency/Urgency: Yes  Urinary Retention: No  Dysuria: Some (improved with Azo)  Urinary Incontinence: No  Proctitis: No  Nocturia: Yes #/night: 2     Emotional  Coping: effective      Skin Alteration   Sensation:intact    Radiation Dermatitis:  Intact [x]     Erythema  []     Discoloration  []     Rash []     Dry desquamation  []     Moist desquamation []           Injury, potential bleeding or infection: none    Lab Results   Component Value Date    WBC 4.2 07/07/2021     07/07/2021         /66   Pulse 55   Temp 98 °F (36.7 °C) (Temporal)   Resp 16   Wt 208 lb (94.3 kg)   SpO2 99%   BMI 28.21 kg/m²      Pain Assessment: None - Denies Pain            Assessment/Plan: Patient was seen today for weekly visit. Azo has been effective for dysuria. Stools soft. Has not used Imodium. Denies other concerns.   Alin Underwood, RN

## 2023-02-23 ENCOUNTER — APPOINTMENT (OUTPATIENT)
Dept: RADIATION ONCOLOGY | Age: 68
End: 2023-02-23
Payer: MEDICARE

## 2023-02-24 ENCOUNTER — APPOINTMENT (OUTPATIENT)
Dept: RADIATION ONCOLOGY | Age: 68
End: 2023-02-24
Payer: MEDICARE

## 2023-02-27 ENCOUNTER — APPOINTMENT (OUTPATIENT)
Dept: RADIATION ONCOLOGY | Age: 68
End: 2023-02-27
Payer: MEDICARE

## 2023-02-27 ENCOUNTER — HOSPITAL ENCOUNTER (OUTPATIENT)
Dept: RADIATION ONCOLOGY | Age: 68
Discharge: HOME | End: 2023-02-27
Payer: MEDICARE

## 2023-02-27 PROCEDURE — 77385 HC NTSTY MODUL RAD TX DLVR SMPL: CPT | Performed by: RADIOLOGY

## 2023-02-28 ENCOUNTER — APPOINTMENT (OUTPATIENT)
Dept: RADIATION ONCOLOGY | Age: 68
End: 2023-02-28
Payer: MEDICARE

## 2023-02-28 PROCEDURE — 77385 HC NTSTY MODUL RAD TX DLVR SMPL: CPT | Performed by: RADIOLOGY

## 2023-03-01 ENCOUNTER — HOSPITAL ENCOUNTER (OUTPATIENT)
Dept: RADIATION ONCOLOGY | Age: 68
Discharge: HOME | End: 2023-03-01
Payer: MEDICARE

## 2023-03-01 VITALS
DIASTOLIC BLOOD PRESSURE: 76 MMHG | SYSTOLIC BLOOD PRESSURE: 137 MMHG | TEMPERATURE: 97.5 F | WEIGHT: 207.6 LBS | BODY MASS INDEX: 28.16 KG/M2 | HEART RATE: 54 BPM | RESPIRATION RATE: 16 BRPM

## 2023-03-01 PROCEDURE — 77385 HC NTSTY MODUL RAD TX DLVR SMPL: CPT | Performed by: RADIOLOGY

## 2023-03-01 ASSESSMENT — PAIN SCALES - GENERAL: PAINLEVEL_OUTOF10: 0

## 2023-03-01 NOTE — PROGRESS NOTES
Al Siddiqui  3/1/2023  Wt Readings from Last 3 Encounters:   03/01/23 207 lb 9.6 oz (94.2 kg)   02/22/23 208 lb (94.3 kg)   02/15/23 208 lb (94.3 kg)     Body mass index is 28.16 kg/m². Treatment Area:prostate    Patient was seen today for weekly visit. Comfort Alteration    Fatigue: Mild    Nutritional Alteration  Anorexia: No  Nausea: No  Vomiting: No     Elimination Alterations  Constipation: no  Diarrhea:  no  Urinary Frequency/Urgency: Yes  Urinary Retention: No  Dysuria: Some (improved with Azo)  Urinary Incontinence: No  Proctitis: No  Nocturia: Yes #/night: 2     Emotional  Coping: effective      Skin Alteration   Sensation:intact    Radiation Dermatitis:  Intact [x]     Erythema  []     Discoloration  []     Rash []     Dry desquamation  []     Moist desquamation []           Injury, potential bleeding or infection: none    Lab Results   Component Value Date    WBC 4.2 07/07/2021     07/07/2021         /76   Pulse 54   Temp 97.5 °F (36.4 °C) (Temporal)   Resp 16   Wt 207 lb 9.6 oz (94.2 kg)   BMI 28.16 kg/m²      Pain Assessment: 0-10  Pain Level: 0         Assessment/Plan: Patient was seen today for weekly visit. Denies concerns with side effects and states no new changes.     Nick Baird RN

## 2023-03-01 NOTE — PROGRESS NOTES
[facility] [address]       Radiation Oncology          [address]          [place], [address]        Nilson Blush: [phone]        F: [phone]       [email or web address]             RADIATION ONCOLOGY WEEKLY PROGRESS NOTE  Patient ID:   Cabrera Elliott  : 1955   MRN: [id number]    Location:  [facility] Radiation Oncology,   [address] [place]   [phone]    DIAGNOSIS:  Cancer Staging  Prostate cancer [facility])  Staging form: Prostate, AJCC 8th Edition  - Clinical stage from 2022: Stage IIB (cT1c, cN0, cM0, PSA: 4.8, Grade Group: 2) - Signed by José Jeter MD on 2022      TREATMENT DETAILS:  Treatment Site: ProstateSV  Actual Dose: 5250cGy  Total Planned Dose: 7000cGy  Treatment Technique: IMRT  Fraction Technique: Daily  Therapy imaging monitoring: CBCT daily  Concurrent Chemotherapy: NA    SUBJECTIVE:   Patient seen for their weekly on treatment evaluation today. Patient is doing well with treatment so far denying any acute complaints. He denies any issues with his bowels. He does have some urinary frequency and a weaker stream but is on Flomax and Proscar. He is using Azo which helps with his dysuria. He denies any fatigue. OBJECTIVE:   CHAPERONE: Declined    ECO Asymptomatic    VITAL SIGNS: /76   Pulse 54   Temp 97.5 °F (36.4 °C) (Temporal)   Resp 16   Wt 207 lb 9.6 oz (94.2 kg)   BMI 28.16 kg/m²   Wt Readings from Last 5 Encounters:   23 207 lb 9.6 oz (94.2 kg)   23 208 lb (94.3 kg)   02/15/23 208 lb (94.3 kg)   23 208 lb 6.4 oz (94.5 kg)   23 209 lb (94.8 kg)     GENERAL:  General appearance is that of a well-nourished, well-developed in no apparent distress. LUNGS:  Pulmonary effort normal. Normal breath sounds. EXTREMITIES:  No edema. Normal ROM. NEUROLOGICAL: Alert and oriented. Strength and sensation intact bilaterally. No focal deficits.    PSYCH: Mood normal, behavior normal.  SKIN: No erythema, no desquamation. LABS:  WBC   Date Value Ref Range Status   07/07/2021 4.2 3.5 - 11.3 k/uL Final   08/24/2020 4.3 3.5 - 11.3 k/uL Final   07/30/2019 4.8 3.5 - 11.0 k/uL Final     Segs Absolute   Date Value Ref Range Status   07/07/2021 2.27 1.50 - 8.10 k/uL Final   08/24/2020 2.68 1.50 - 8.10 k/uL Final   07/30/2019 3.00 1.8 - 7.7 k/uL Final     Hemoglobin   Date Value Ref Range Status   07/07/2021 15.5 13.0 - 17.0 g/dL Final   08/24/2020 15.3 13.0 - 17.0 g/dL Final   07/30/2019 15.2 13.5 - 17.5 g/dL Final     Platelets   Date Value Ref Range Status   07/07/2021 196 138 - 453 k/uL Final   08/24/2020 182 138 - 453 k/uL Final   07/30/2019 222 140 - 450 k/uL Final     Creatinine   Date Value Ref Range Status   01/06/2023 1.04 0.70 - 1.20 mg/dL Final   07/05/2022 1.1 0.7 - 1.3 mg/dL Final   02/16/2022 0.97 0.70 - 1.20 mg/dL Final     POC Creatinine   Date Value Ref Range Status   01/06/2023 1.09 0.51 - 1.19 mg/dL Final     No results found for: , CEA  PSA   Date Value Ref Range Status   01/06/2023 2.53 <4.1 ng/mL Final     Comment:     The Roche \"ECLIA\" assay is used. Results obtained with different assay methods cannot be   used interchangeably. 09/01/2022 3.3 0.0 - 4.0 ug/L Final     Comment:           Siemens Immulite 2000 immunometric chemiluminescent assay is used. Results obtained with   different assay methods or instruments cannot be used interchangeably. 08/01/2022 4.75 (H) <4.1 ng/mL Final     Comment:     The Roche \"ECLIA\" assay is used. Results obtained with different assay methods cannot be   used interchangeably. 02/16/2022 3.2 0.0 - 4.0 ug/L Final     Comment:           Siemens Immulite 2000 immunometric chemiluminescent assay is used. Results obtained with   different assay methods or instruments cannot be used interchangeably. 01/10/2022 4.71 (H) <4.1 ug/L Final     Comment:     The Roche \"ECLIA\" assay is used.   Results obtained with different assay methods cannot be   used interchangeably. MEDICATIONS:    Current Outpatient Medications:     finasteride (PROSCAR) 5 MG tablet, Take 5 mg by mouth daily, Disp: , Rfl:     tamsulosin (FLOMAX) 0.4 MG capsule, Take 1 capsule by mouth in the morning and at bedtime TALE 1 CAPSULE ONCE A DAY, Disp: 120 capsule, Rfl: 1    PRIVIGEN 20 GM/200ML SOLN solution, , Disp: , Rfl:     tadalafil (CIALIS) 10 MG tablet, Take 1 tablet by mouth as needed for Erectile Dysfunction, Disp: 30 tablet, Rfl: 3    b complex vitamins capsule, Take 1 capsule by mouth daily. , Disp: , Rfl:     Ascorbic Acid (VITAMIN C) 250 MG tablet, Take 500 mg by mouth daily. , Disp: , Rfl:     Omega-3 Fatty Acids (FISH OIL) 1000 MG CAPS, Take 1,000 mg by mouth daily. , Disp: , Rfl:     Vitamin D (CHOLECALCIFEROL) 1000 UNITS CAPS capsule, Take 1,000 Units by mouth daily. , Disp: , Rfl:     vitamin B-12 (CYANOCOBALAMIN) 1000 MCG tablet, Take 1,000 mcg by mouth daily. , Disp: , Rfl:       ASSESSMENT PLAN:   Treatment setup and plan reviewed. Port images/CBCT images reviewed. Appropriate laboratory work was reviewed. Treatment side effects and toxicities reviewed with the patient, and appropriate management was advised. Will continue radiation treatment as planned, and recommend patient contact us if they have any questions or concerns. Continue monitoring his urinary frequency and if needed medication could be an option. Patient is recommended to continue using Azo and Finasteride. She will follow-up in 3 months with a PSA once finished with treatments. Electronically signed by Ladonna Garcia MD on 3/1/2023 at 3:29 PM      Drugs Prescribed:  New Prescriptions    No medications on file       Other Orders Placed:  No orders of the defined types were placed in this encounter.

## 2023-03-02 ENCOUNTER — HOSPITAL ENCOUNTER (OUTPATIENT)
Dept: RADIATION ONCOLOGY | Age: 68
Discharge: HOME | End: 2023-03-02
Payer: MEDICARE

## 2023-03-02 PROCEDURE — 77385 HC NTSTY MODUL RAD TX DLVR SMPL: CPT | Performed by: RADIOLOGY

## 2023-03-03 ENCOUNTER — HOSPITAL ENCOUNTER (OUTPATIENT)
Dept: RADIATION ONCOLOGY | Age: 68
Discharge: HOME | End: 2023-03-03
Payer: MEDICARE

## 2023-03-03 PROCEDURE — 77385 HC NTSTY MODUL RAD TX DLVR SMPL: CPT | Performed by: RADIOLOGY

## 2023-03-06 ENCOUNTER — HOSPITAL ENCOUNTER (OUTPATIENT)
Dept: RADIATION ONCOLOGY | Age: 68
Discharge: HOME | End: 2023-03-06
Payer: MEDICARE

## 2023-03-06 PROCEDURE — 77385 HC NTSTY MODUL RAD TX DLVR SMPL: CPT | Performed by: RADIOLOGY

## 2023-03-07 ENCOUNTER — HOSPITAL ENCOUNTER (OUTPATIENT)
Dept: RADIATION ONCOLOGY | Age: 68
Discharge: HOME | End: 2023-03-07
Payer: MEDICARE

## 2023-03-07 PROCEDURE — 77385 HC NTSTY MODUL RAD TX DLVR SMPL: CPT | Performed by: RADIOLOGY

## 2023-03-08 ENCOUNTER — HOSPITAL ENCOUNTER (OUTPATIENT)
Dept: RADIATION ONCOLOGY | Age: 68
Discharge: HOME | End: 2023-03-08
Payer: MEDICARE

## 2023-03-08 VITALS
HEART RATE: 53 BPM | RESPIRATION RATE: 16 BRPM | DIASTOLIC BLOOD PRESSURE: 77 MMHG | OXYGEN SATURATION: 97 % | SYSTOLIC BLOOD PRESSURE: 129 MMHG | TEMPERATURE: 98 F

## 2023-03-08 DIAGNOSIS — C61 PROSTATE CANCER (HCC): Primary | ICD-10-CM

## 2023-03-08 PROCEDURE — 77336 RADIATION PHYSICS CONSULT: CPT | Performed by: RADIOLOGY

## 2023-03-08 PROCEDURE — 77385 HC NTSTY MODUL RAD TX DLVR SMPL: CPT | Performed by: RADIOLOGY

## 2023-03-08 NOTE — PROGRESS NOTES
[facility] [address]       Radiation Oncology          [address] [facility]          [place] [address]        Dasia Goo: [phone]        F: [phone]       [place]             RADIATION ONCOLOGY WEEKLY PROGRESS NOTE  Patient ID:   Didier Young  : 1955   MRN: [id number]    Location:  [facility] Radiation Oncology,   [address] [facility] [place]   [phone]    DIAGNOSIS:  Cancer Staging  Prostate cancer [facility])  Staging form: Prostate, AJCC 8th Edition  - Clinical stage from 2022: Stage IIB (cT1c, cN0, cM0, PSA: 4.8, Grade Group: 2) - Signed by Paco Mcintosh MD on 2022      TREATMENT DETAILS:  Treatment Site: ProstateSV  Actual Dose: 6500cGy  Total Planned Dose: 7000cGy  Treatment Technique: IMRT  Fraction Technique: Daily  Therapy imaging monitoring: CBCT daily  Concurrent Chemotherapy: NA    SUBJECTIVE:   Patient seen for their weekly on treatment evaluation today. Patient is doing well with treatment so far denying any acute complaints. He denies any issues with his bowels. He does have some urinary frequency and a weaker stream but is on Flomax and Proscar. He is using Azo which helps with his dysuria. He denies any fatigue. He finished treatments today. OBJECTIVE:   CHAPERONE: Declined    ECO Asymptomatic    VITAL SIGNS: /77   Pulse 53   Temp 98 °F (36.7 °C) (Temporal)   Resp 16   SpO2 97%   Wt Readings from Last 5 Encounters:   23 207 lb 9.6 oz (94.2 kg)   23 208 lb (94.3 kg)   02/15/23 208 lb (94.3 kg)   23 208 lb 6.4 oz (94.5 kg)   23 209 lb (94.8 kg)     GENERAL:  General appearance is that of a well-nourished, well-developed in no apparent distress. LUNGS:  Pulmonary effort normal. Normal breath sounds. EXTREMITIES:  No edema. Normal ROM. NEUROLOGICAL: Alert and oriented. Strength and sensation intact bilaterally. No focal deficits.    PSYCH: Mood normal, behavior normal.  SKIN: No erythema, no desquamation. LABS:  WBC   Date Value Ref Range Status   07/07/2021 4.2 3.5 - 11.3 k/uL Final   08/24/2020 4.3 3.5 - 11.3 k/uL Final   07/30/2019 4.8 3.5 - 11.0 k/uL Final     Segs Absolute   Date Value Ref Range Status   07/07/2021 2.27 1.50 - 8.10 k/uL Final   08/24/2020 2.68 1.50 - 8.10 k/uL Final   07/30/2019 3.00 1.8 - 7.7 k/uL Final     Hemoglobin   Date Value Ref Range Status   07/07/2021 15.5 13.0 - 17.0 g/dL Final   08/24/2020 15.3 13.0 - 17.0 g/dL Final   07/30/2019 15.2 13.5 - 17.5 g/dL Final     Platelets   Date Value Ref Range Status   07/07/2021 196 138 - 453 k/uL Final   08/24/2020 182 138 - 453 k/uL Final   07/30/2019 222 140 - 450 k/uL Final     Creatinine   Date Value Ref Range Status   01/06/2023 1.04 0.70 - 1.20 mg/dL Final   07/05/2022 1.1 0.7 - 1.3 mg/dL Final   02/16/2022 0.97 0.70 - 1.20 mg/dL Final     POC Creatinine   Date Value Ref Range Status   01/06/2023 1.09 0.51 - 1.19 mg/dL Final     No results found for: , CEA  PSA   Date Value Ref Range Status   01/06/2023 2.53 <4.1 ng/mL Final     Comment:     The Roche \"ECLIA\" assay is used. Results obtained with different assay methods cannot be   used interchangeably. 09/01/2022 3.3 0.0 - 4.0 ug/L Final     Comment:           Siemens Immulite 2000 immunometric chemiluminescent assay is used. Results obtained with   different assay methods or instruments cannot be used interchangeably. 08/01/2022 4.75 (H) <4.1 ng/mL Final     Comment:     The Roche \"ECLIA\" assay is used. Results obtained with different assay methods cannot be   used interchangeably. 02/16/2022 3.2 0.0 - 4.0 ug/L Final     Comment:           Siemens Immulite 2000 immunometric chemiluminescent assay is used. Results obtained with   different assay methods or instruments cannot be used interchangeably. 01/10/2022 4.71 (H) <4.1 ug/L Final     Comment:     The Roche \"ECLIA\" assay is used.   Results obtained with different assay methods cannot be   used interchangeably. MEDICATIONS:    Current Outpatient Medications:     finasteride (PROSCAR) 5 MG tablet, Take 5 mg by mouth daily, Disp: , Rfl:     tamsulosin (FLOMAX) 0.4 MG capsule, Take 1 capsule by mouth in the morning and at bedtime TALE 1 CAPSULE ONCE A DAY, Disp: 120 capsule, Rfl: 1    PRIVIGEN 20 GM/200ML SOLN solution, , Disp: , Rfl:     tadalafil (CIALIS) 10 MG tablet, Take 1 tablet by mouth as needed for Erectile Dysfunction, Disp: 30 tablet, Rfl: 3    b complex vitamins capsule, Take 1 capsule by mouth daily. , Disp: , Rfl:     Ascorbic Acid (VITAMIN C) 250 MG tablet, Take 500 mg by mouth daily. , Disp: , Rfl:     Omega-3 Fatty Acids (FISH OIL) 1000 MG CAPS, Take 1,000 mg by mouth daily. , Disp: , Rfl:     Vitamin D (CHOLECALCIFEROL) 1000 UNITS CAPS capsule, Take 1,000 Units by mouth daily. , Disp: , Rfl:     vitamin B-12 (CYANOCOBALAMIN) 1000 MCG tablet, Take 1,000 mcg by mouth daily. , Disp: , Rfl:       ASSESSMENT PLAN:   Treatment setup and plan reviewed. Port images/CBCT images reviewed. Appropriate laboratory work was reviewed. Treatment side effects and toxicities reviewed with the patient, and appropriate management was advised. Will continue radiation treatment as planned, and recommend patient contact us if they have any questions or concerns. Continue monitoring his urinary frequency and if needed medication could be an option. Patient is recommended to continue using Azo and Finasteride. He will follow-up with urology next month, and with us in 3 months with a PSA. Electronically signed by Pepito Gutiérrez MD on 3/8/2023 at 4:39 PM      Drugs Prescribed:  New Prescriptions    No medications on file       Other Orders Placed:  No orders of the defined types were placed in this encounter.

## 2023-03-08 NOTE — PROGRESS NOTES
Ann Stryker  3/8/2023  Wt Readings from Last 3 Encounters:   03/01/23 207 lb 9.6 oz (94.2 kg)   02/22/23 208 lb (94.3 kg)   02/15/23 208 lb (94.3 kg)     There is no height or weight on file to calculate BMI. Treatment Area:prostate    Patient was seen today for weekly visit. Comfort Alteration    Fatigue: Mild    Nutritional Alteration  Anorexia: No  Nausea: No  Vomiting: No     Elimination Alterations  Constipation: no  Diarrhea:  no  Urinary Frequency/Urgency: No  Urinary Retention: No  Dysuria: Some (improved with Azo)  Urinary Incontinence: No  Proctitis: No  Nocturia: Yes #/night: 2     Emotional  Coping: effective      Skin Alteration   Sensation:intact    Radiation Dermatitis:  Intact [x]     Erythema  []     Discoloration  []     Rash []     Dry desquamation  []     Moist desquamation []           Injury, potential bleeding or infection: none    Lab Results   Component Value Date    WBC 4.2 07/07/2021     07/07/2021         /77   Pulse 53   Temp 98 °F (36.7 °C) (Temporal)   Resp 16   SpO2 97%      Pain Assessment: None - Denies Pain            Assessment/Plan: Patient was seen today for weekly visit. He arrives ambulatory with steady gait. Two treatments remaining. Denies concerns with side effects and states no new changes. He will follow up in four months with PSA level.      Margie Monroe RN

## 2023-03-09 ENCOUNTER — HOSPITAL ENCOUNTER (OUTPATIENT)
Dept: RADIATION ONCOLOGY | Age: 68
Discharge: HOME | End: 2023-03-09
Payer: MEDICARE

## 2023-03-09 PROCEDURE — 77385 HC NTSTY MODUL RAD TX DLVR SMPL: CPT | Performed by: RADIOLOGY

## 2023-03-10 ENCOUNTER — HOSPITAL ENCOUNTER (OUTPATIENT)
Dept: RADIATION ONCOLOGY | Age: 68
Discharge: HOME | End: 2023-03-10
Payer: MEDICARE

## 2023-03-10 PROCEDURE — 77385 HC NTSTY MODUL RAD TX DLVR SMPL: CPT | Performed by: RADIOLOGY

## 2023-03-13 ENCOUNTER — APPOINTMENT (OUTPATIENT)
Dept: RADIATION ONCOLOGY | Age: 68
End: 2023-03-13
Payer: MEDICARE

## 2023-06-02 ENCOUNTER — OFFICE VISIT (OUTPATIENT)
Dept: FAMILY MEDICINE CLINIC | Age: 68
End: 2023-06-02
Payer: MEDICARE

## 2023-06-02 VITALS
HEART RATE: 52 BPM | DIASTOLIC BLOOD PRESSURE: 70 MMHG | SYSTOLIC BLOOD PRESSURE: 140 MMHG | HEIGHT: 72 IN | OXYGEN SATURATION: 96 % | TEMPERATURE: 97 F | BODY MASS INDEX: 28.15 KG/M2 | WEIGHT: 207.8 LBS

## 2023-06-02 DIAGNOSIS — N40.1 BENIGN PROSTATIC HYPERPLASIA WITH NOCTURIA: ICD-10-CM

## 2023-06-02 DIAGNOSIS — Z76.89 ENCOUNTER TO ESTABLISH CARE: Primary | ICD-10-CM

## 2023-06-02 DIAGNOSIS — J01.90 ACUTE NON-RECURRENT SINUSITIS, UNSPECIFIED LOCATION: ICD-10-CM

## 2023-06-02 DIAGNOSIS — R35.1 BENIGN PROSTATIC HYPERPLASIA WITH NOCTURIA: ICD-10-CM

## 2023-06-02 DIAGNOSIS — R03.0 ELEVATED BP WITHOUT DIAGNOSIS OF HYPERTENSION: ICD-10-CM

## 2023-06-02 PROCEDURE — 99212 OFFICE O/P EST SF 10 MIN: CPT | Performed by: FAMILY MEDICINE

## 2023-06-02 PROCEDURE — 1123F ACP DISCUSS/DSCN MKR DOCD: CPT | Performed by: FAMILY MEDICINE

## 2023-06-02 PROCEDURE — G8427 DOCREV CUR MEDS BY ELIG CLIN: HCPCS | Performed by: FAMILY MEDICINE

## 2023-06-02 PROCEDURE — G8417 CALC BMI ABV UP PARAM F/U: HCPCS | Performed by: FAMILY MEDICINE

## 2023-06-02 PROCEDURE — 99214 OFFICE O/P EST MOD 30 MIN: CPT | Performed by: FAMILY MEDICINE

## 2023-06-02 PROCEDURE — 3017F COLORECTAL CA SCREEN DOC REV: CPT | Performed by: FAMILY MEDICINE

## 2023-06-02 PROCEDURE — 1036F TOBACCO NON-USER: CPT | Performed by: FAMILY MEDICINE

## 2023-06-02 RX ORDER — FLUTICASONE PROPIONATE 50 MCG
2 SPRAY, SUSPENSION (ML) NASAL DAILY
Qty: 16 G | Refills: 0 | Status: SHIPPED | OUTPATIENT
Start: 2023-06-02

## 2023-06-02 RX ORDER — CEFUROXIME AXETIL 500 MG/1
500 TABLET ORAL 2 TIMES DAILY
Qty: 14 TABLET | Refills: 0 | Status: SHIPPED | OUTPATIENT
Start: 2023-06-02 | End: 2023-06-09

## 2023-06-02 NOTE — PROGRESS NOTES
HPI:  Patient comes in today for   Chief Complaint   Patient presents with    Sinusitis     Pt is here for possible sinus infection. He states he is having pressure in his head. coughing,sneezing. Pt states it started 1 month ago. Denies fevers. Here to estbalish.c/o sinus congestion and forehead pressure the past month has some coughing no shortness of breath or fever. No nause or emesis. Has bilateral  cataracts is scheduled for surgery soon. No chest pain  or Dizziness . H/o BPH sees urology at [address] [place] [address].    H/o Peripheral neuropathy sees neurology is on Privigen infusions every 2 months the past 7 years  HISTORY:  Past Medical History:   Diagnosis Date    BPH (benign prostatic hypertrophy)     with outlet symptoms    Carpal tunnel syndrome, bilateral     Cervical disc disorder     Footdrop     Left    Herpes simplex labialis     episodic    Hyperlipidemia     Hypothyroidism     Mumps     Neck pain     Neuropathy     Paresthesias     Peripheral neuropathy     Pneumonia     history of    Ulnar neuropathy of right upper extremity        Past Surgical History:   Procedure Laterality Date    CARPAL TUNNEL RELEASE Right feb 2014    also ulnar nerve r elbow     COLONOSCOPY  2009    INGUINAL HERNIA REPAIR Left 9/14/2020    Laparoscopic Robotic Assisted Left Inguinal Hernia Repair performed by Vanessa Macias DO at [facility]      peridontal    OTHER SURGICAL HISTORY      IVIG INFUSIONS STARTING NOVEMBER OF 2015 AND EVERY 3 MONTH SINCE    TOE SURGERY          Family History   Problem Relation Age of Onset    Heart Disease Mother     Cancer Mother         lung    Cancer Father         lung    Glaucoma Neg Hx        Social History     Socioeconomic History    Marital status:      Spouse name: Not on file    Number of children: 2    Years of education: Not on file    Highest education level: Not on file   Occupational History     Employer: [employer]   Tobacco Use    Smoking status:

## 2023-07-07 ENCOUNTER — OFFICE VISIT (OUTPATIENT)
Dept: FAMILY MEDICINE CLINIC | Age: 68
End: 2023-07-07
Payer: MEDICARE

## 2023-07-07 VITALS
WEIGHT: 206 LBS | HEART RATE: 57 BPM | SYSTOLIC BLOOD PRESSURE: 120 MMHG | OXYGEN SATURATION: 94 % | BODY MASS INDEX: 27.9 KG/M2 | DIASTOLIC BLOOD PRESSURE: 80 MMHG | HEIGHT: 72 IN

## 2023-07-07 DIAGNOSIS — R09.81 NASAL CONGESTION: ICD-10-CM

## 2023-07-07 DIAGNOSIS — E78.2 MIXED HYPERLIPIDEMIA: ICD-10-CM

## 2023-07-07 DIAGNOSIS — R03.0 ELEVATED BP WITHOUT DIAGNOSIS OF HYPERTENSION: ICD-10-CM

## 2023-07-07 PROCEDURE — G8417 CALC BMI ABV UP PARAM F/U: HCPCS | Performed by: FAMILY MEDICINE

## 2023-07-07 PROCEDURE — 99213 OFFICE O/P EST LOW 20 MIN: CPT | Performed by: FAMILY MEDICINE

## 2023-07-07 PROCEDURE — 1036F TOBACCO NON-USER: CPT | Performed by: FAMILY MEDICINE

## 2023-07-07 PROCEDURE — 1123F ACP DISCUSS/DSCN MKR DOCD: CPT | Performed by: FAMILY MEDICINE

## 2023-07-07 PROCEDURE — 99212 OFFICE O/P EST SF 10 MIN: CPT | Performed by: FAMILY MEDICINE

## 2023-07-07 PROCEDURE — 3017F COLORECTAL CA SCREEN DOC REV: CPT | Performed by: FAMILY MEDICINE

## 2023-07-07 PROCEDURE — G8427 DOCREV CUR MEDS BY ELIG CLIN: HCPCS | Performed by: FAMILY MEDICINE

## 2023-07-07 SDOH — ECONOMIC STABILITY: INCOME INSECURITY: HOW HARD IS IT FOR YOU TO PAY FOR THE VERY BASICS LIKE FOOD, HOUSING, MEDICAL CARE, AND HEATING?: NOT HARD AT ALL

## 2023-07-07 SDOH — ECONOMIC STABILITY: FOOD INSECURITY: WITHIN THE PAST 12 MONTHS, THE FOOD YOU BOUGHT JUST DIDN'T LAST AND YOU DIDN'T HAVE MONEY TO GET MORE.: NEVER TRUE

## 2023-07-07 SDOH — ECONOMIC STABILITY: FOOD INSECURITY: WITHIN THE PAST 12 MONTHS, YOU WORRIED THAT YOUR FOOD WOULD RUN OUT BEFORE YOU GOT MONEY TO BUY MORE.: NEVER TRUE

## 2023-07-07 SDOH — ECONOMIC STABILITY: HOUSING INSECURITY
IN THE LAST 12 MONTHS, WAS THERE A TIME WHEN YOU DID NOT HAVE A STEADY PLACE TO SLEEP OR SLEPT IN A SHELTER (INCLUDING NOW)?: NO

## 2023-07-07 ASSESSMENT — PATIENT HEALTH QUESTIONNAIRE - PHQ9
1. LITTLE INTEREST OR PLEASURE IN DOING THINGS: 0
SUM OF ALL RESPONSES TO PHQ QUESTIONS 1-9: 0
SUM OF ALL RESPONSES TO PHQ9 QUESTIONS 1 & 2: 0
2. FEELING DOWN, DEPRESSED OR HOPELESS: 0
SUM OF ALL RESPONSES TO PHQ QUESTIONS 1-9: 0

## 2023-07-07 NOTE — PROGRESS NOTES
HPI:  Patient comes in today for   Chief Complaint   Patient presents with    Follow-up     Pt is here for a f/u. He states that his headaches are still there but he is nit getting them as hard. Here to f/u on headaches is improved was treated for sinusitis,still has some nasal congestion and occasional sneezing with runny nose,no cough or SOB. No nause or emesis. Has bilateral  cataracts is scheduled for surgery soon. No chest pain  or Dizziness . H/o BPH sees urology at [address] [facility].    H/o Peripheral neuropathy sees neurology is on Privigen infusions every 2 months the past 7 years  HISTORY:  Past Medical History:   Diagnosis Date    BPH (benign prostatic hypertrophy)     with outlet symptoms    Carpal tunnel syndrome, bilateral     Cervical disc disorder     Footdrop     Left    Herpes simplex labialis     episodic    Hyperlipidemia     Hypothyroidism     Mumps     Neck pain     Neuropathy     Paresthesias     Peripheral neuropathy     Pneumonia     history of    Ulnar neuropathy of right upper extremity        Past Surgical History:   Procedure Laterality Date    CARPAL TUNNEL RELEASE Right feb 2014    also ulnar nerve r elbow     COLONOSCOPY  2009    INGUINAL HERNIA REPAIR Left 9/14/2020    Laparoscopic Robotic Assisted Left Inguinal Hernia Repair performed by Elidia Hui DO at [address] [place] [address]      [facility]    OTHER SURGICAL HISTORY      IVIG INFUSIONS STARTING NOVEMBER OF 2015 AND EVERY 3 MONTH SINCE    TOE SURGERY          Family History   Problem Relation Age of Onset    Heart Disease Mother     Cancer Mother         lung    Cancer Father         lung    Glaucoma Neg Hx        Social History     Socioeconomic History    Marital status:      Spouse name: Not on file    Number of children: 2    Years of education: Not on file    Highest education level: Not on file   Occupational History     Employer: [employer]   Tobacco Use    Smoking status: Former     Packs/day: 1.00

## 2023-07-10 ENCOUNTER — HOSPITAL ENCOUNTER (OUTPATIENT)
Age: 68
Discharge: HOME | End: 2023-07-10
Payer: MEDICARE

## 2023-07-10 DIAGNOSIS — C61 PROSTATE CANCER (HCC): ICD-10-CM

## 2023-07-10 LAB — PSA SERPL-MCNC: 0.32 NG/ML

## 2023-07-10 PROCEDURE — 84153 ASSAY OF PSA TOTAL: CPT

## 2023-07-10 PROCEDURE — 36415 COLL VENOUS BLD VENIPUNCTURE: CPT

## 2023-07-20 ENCOUNTER — HOSPITAL ENCOUNTER (OUTPATIENT)
Dept: RADIATION ONCOLOGY | Age: 68
Discharge: HOME | End: 2023-07-20
Payer: MEDICARE

## 2023-07-20 VITALS
BODY MASS INDEX: 28.1 KG/M2 | DIASTOLIC BLOOD PRESSURE: 72 MMHG | SYSTOLIC BLOOD PRESSURE: 132 MMHG | TEMPERATURE: 98.3 F | WEIGHT: 207.2 LBS | HEART RATE: 62 BPM | RESPIRATION RATE: 16 BRPM

## 2023-07-20 DIAGNOSIS — C61 PROSTATE CANCER (HCC): Primary | ICD-10-CM

## 2023-07-20 PROCEDURE — 99212 OFFICE O/P EST SF 10 MIN: CPT | Performed by: RADIOLOGY

## 2023-07-20 ASSESSMENT — PAIN SCALES - GENERAL: PAINLEVEL_OUTOF10: 0

## 2023-08-23 LAB
ALBUMIN/GLOBULIN RATIO: 1 G/DL
ALBUMIN: 4.2 G/DL (ref 3.5–5)
ALP BLD-CCNC: 64 UNITS/L (ref 38–126)
ALT SERPL-CCNC: 37 UNITS/L (ref 4–50)
ANION GAP SERPL CALCULATED.3IONS-SCNC: 9.8 MMOL/L (ref 4–12)
AST SERPL-CCNC: 40 UNITS/L (ref 17–59)
BILIRUB SERPL-MCNC: 1.2 MG/DL (ref 0.2–1.3)
BUN BLDV-MCNC: 16 MG/DL (ref 9–20)
CALCIUM SERPL-MCNC: 9.2 MG/DL (ref 8.4–10.2)
CHLORIDE BLD-SCNC: 108 MMOL/L (ref 98–120)
CHOLESTEROL/HDL RATIO: 7.26 RATIO (ref 0–4.5)
CHOLESTEROL: 254 MG/DL (ref 50–200)
CO2: 23 MMOL/L (ref 22–31)
CREAT SERPL-MCNC: 1 MG/DL (ref 0.7–1.3)
GFR CALCULATED: > 60
GLOBULIN: 4.1 G/DL
GLUCOSE: 103 MG/DL (ref 75–110)
HCT VFR BLD CALC: 42.9 % (ref 42–52)
HDLC SERPL-MCNC: 35 MG/DL (ref 36–68)
HEMOGLOBIN: 14.2 (ref 13.8–17.8)
LDL CHOLESTEROL CALCULATED: 160.4 MG/DL (ref 0–160)
MCH RBC QN AUTO: 30.4 PG (ref 28.5–32.5)
MCHC RBC AUTO-ENTMCNC: 33 G/DL (ref 32–37)
MCV RBC AUTO: 92.2 FL (ref 80–94)
PDW BLD-RTO: 11.9 % (ref 10–15.5)
PLATELET # BLD: 176.9 THOU/MM3 (ref 130–400)
POTASSIUM SERPL-SCNC: 4.2 MMOL/L (ref 3.6–5)
RBC: 4.66 M/UL (ref 4.7–6.1)
SODIUM BLD-SCNC: 141 MMOL/L (ref 135–145)
TOTAL PROTEIN, SERUM: 8.3 G/DL (ref 6.3–8.2)
TRIGL SERPL-MCNC: 293 MG/DL (ref 10–250)
TSH SERPL DL<=0.05 MIU/L-ACNC: 1.95 MIU/ML (ref 0.49–4.67)
VLDLC SERPL CALC-MCNC: 58.6 MG/DL (ref 0–50)
WBC: 3.4 THOU/ML3 (ref 4.8–10.8)

## 2023-09-05 ENCOUNTER — OFFICE VISIT (OUTPATIENT)
Dept: FAMILY MEDICINE CLINIC | Age: 68
End: 2023-09-05
Payer: MEDICARE

## 2023-09-05 VITALS
DIASTOLIC BLOOD PRESSURE: 78 MMHG | HEIGHT: 72 IN | OXYGEN SATURATION: 91 % | WEIGHT: 210 LBS | HEART RATE: 86 BPM | SYSTOLIC BLOOD PRESSURE: 140 MMHG | BODY MASS INDEX: 28.44 KG/M2

## 2023-09-05 DIAGNOSIS — E78.00 HYPERCHOLESTEROLEMIA: Primary | ICD-10-CM

## 2023-09-05 DIAGNOSIS — R03.0 ELEVATED BP WITHOUT DIAGNOSIS OF HYPERTENSION: ICD-10-CM

## 2023-09-05 DIAGNOSIS — Z86.19 H/O COLD SORES: ICD-10-CM

## 2023-09-05 DIAGNOSIS — R21 SKIN RASH: ICD-10-CM

## 2023-09-05 DIAGNOSIS — R09.81 NASAL CONGESTION: ICD-10-CM

## 2023-09-05 PROCEDURE — G8427 DOCREV CUR MEDS BY ELIG CLIN: HCPCS | Performed by: FAMILY MEDICINE

## 2023-09-05 PROCEDURE — 3017F COLORECTAL CA SCREEN DOC REV: CPT | Performed by: FAMILY MEDICINE

## 2023-09-05 PROCEDURE — 99212 OFFICE O/P EST SF 10 MIN: CPT | Performed by: FAMILY MEDICINE

## 2023-09-05 PROCEDURE — PBSHW PBB SHADOW CHARGE: Performed by: FAMILY MEDICINE

## 2023-09-05 PROCEDURE — 1036F TOBACCO NON-USER: CPT | Performed by: FAMILY MEDICINE

## 2023-09-05 PROCEDURE — 1123F ACP DISCUSS/DSCN MKR DOCD: CPT | Performed by: FAMILY MEDICINE

## 2023-09-05 PROCEDURE — G8417 CALC BMI ABV UP PARAM F/U: HCPCS | Performed by: FAMILY MEDICINE

## 2023-09-05 PROCEDURE — 99214 OFFICE O/P EST MOD 30 MIN: CPT | Performed by: FAMILY MEDICINE

## 2023-09-05 RX ORDER — VALACYCLOVIR HYDROCHLORIDE 1 G/1
1000 TABLET, FILM COATED ORAL 2 TIMES DAILY
COMMUNITY
End: 2023-09-05 | Stop reason: SDUPTHER

## 2023-09-05 RX ORDER — FLUTICASONE PROPIONATE 50 MCG
2 SPRAY, SUSPENSION (ML) NASAL DAILY
Qty: 16 G | Refills: 2 | Status: SHIPPED | OUTPATIENT
Start: 2023-09-05

## 2023-09-05 RX ORDER — VALACYCLOVIR HYDROCHLORIDE 1 G/1
1000 TABLET, FILM COATED ORAL 2 TIMES DAILY
Status: CANCELLED | OUTPATIENT
Start: 2023-09-05

## 2023-09-05 RX ORDER — VALACYCLOVIR HYDROCHLORIDE 1 G/1
1000 TABLET, FILM COATED ORAL 2 TIMES DAILY
Qty: 10 TABLET | Refills: 1 | Status: SHIPPED | OUTPATIENT
Start: 2023-09-05

## 2023-09-05 RX ORDER — DEXAMETHASONE SODIUM PHOSPHATE 4 MG/ML
8 INJECTION, SOLUTION INTRA-ARTICULAR; INTRALESIONAL; INTRAMUSCULAR; INTRAVENOUS; SOFT TISSUE ONCE
Status: COMPLETED | OUTPATIENT
Start: 2023-09-05 | End: 2023-09-05

## 2023-09-05 RX ADMIN — DEXAMETHASONE SODIUM PHOSPHATE 8 MG: 4 INJECTION, SOLUTION INTRAMUSCULAR; INTRAVENOUS at 10:25

## 2023-09-05 NOTE — PROGRESS NOTES
HPI:  Patient comes in today for   Chief Complaint   Patient presents with    Follow-up     Pt is here for a follow up. Pt states he is still getting dizzy when he pends over and sneeze or cough but it is not as bad. Rash     Pt does have a rash all over his body. He noticed it last friday. Pt states it does not itch    Here to f/u on headaches is improved,still has some nasal congestion and occasional sneezing with runny nose and cough no SOB.c/o skin rash in arms and backs the past 3 days was out in the heat,no itching no exposure to any chemicals,no new medications,no food allergy. No sorethroat or shortness of breath. Has had bilateral  cataracts done last month . H/o BPH sees urology at [facility]. H/o Peripheral neuropathy sees neurology is on Privigen infusions every 2 months the past 7 years. h/o cold sores needs refill on valtrex  HISTORY:  Past Medical History:   Diagnosis Date    BPH (benign prostatic hypertrophy)     with outlet symptoms    Carpal tunnel syndrome, bilateral     Cervical disc disorder     Footdrop     Left    Herpes simplex labialis     episodic    Hyperlipidemia     Hypothyroidism     Mumps     Neck pain     Neuropathy     Paresthesias     Peripheral neuropathy     Pneumonia     history of    Ulnar neuropathy of right upper extremity        Past Surgical History:   Procedure Laterality Date    CARPAL TUNNEL RELEASE Right feb 2014    also ulnar nerve r elbow     COLONOSCOPY  2009    INGUINAL HERNIA REPAIR Left 9/14/2020    Laparoscopic Robotic Assisted Left Inguinal Hernia Repair performed by Maria T Console, DO at [address] [place] [address]      peridontal    OTHER SURGICAL HISTORY      IVIG INFUSIONS STARTING NOVEMBER OF 2015 AND EVERY 3 MONTH SINCE    TOE SURGERY          Family History   Problem Relation Age of Onset    Heart Disease Mother     Cancer Mother         lung    Cancer Father         lung    Glaucoma Neg Hx        Social History     Socioeconomic History    Marital

## 2023-12-05 ENCOUNTER — OFFICE VISIT (OUTPATIENT)
Dept: FAMILY MEDICINE CLINIC | Age: 68
End: 2023-12-05
Payer: MEDICARE

## 2023-12-05 VITALS
BODY MASS INDEX: 28.48 KG/M2 | SYSTOLIC BLOOD PRESSURE: 148 MMHG | OXYGEN SATURATION: 100 % | WEIGHT: 210 LBS | HEART RATE: 67 BPM | DIASTOLIC BLOOD PRESSURE: 78 MMHG

## 2023-12-05 DIAGNOSIS — R09.81 NASAL CONGESTION: ICD-10-CM

## 2023-12-05 DIAGNOSIS — I10 HYPERTENSION, UNSPECIFIED TYPE: ICD-10-CM

## 2023-12-05 DIAGNOSIS — Z87.09 HISTORY OF CHRONIC SINUSITIS: ICD-10-CM

## 2023-12-05 PROCEDURE — G8484 FLU IMMUNIZE NO ADMIN: HCPCS | Performed by: FAMILY MEDICINE

## 2023-12-05 PROCEDURE — 99212 OFFICE O/P EST SF 10 MIN: CPT | Performed by: FAMILY MEDICINE

## 2023-12-05 PROCEDURE — G8417 CALC BMI ABV UP PARAM F/U: HCPCS | Performed by: FAMILY MEDICINE

## 2023-12-05 PROCEDURE — 99214 OFFICE O/P EST MOD 30 MIN: CPT | Performed by: FAMILY MEDICINE

## 2023-12-05 PROCEDURE — 3078F DIAST BP <80 MM HG: CPT | Performed by: FAMILY MEDICINE

## 2023-12-05 PROCEDURE — 3074F SYST BP LT 130 MM HG: CPT | Performed by: FAMILY MEDICINE

## 2023-12-05 PROCEDURE — 1036F TOBACCO NON-USER: CPT | Performed by: FAMILY MEDICINE

## 2023-12-05 PROCEDURE — 3017F COLORECTAL CA SCREEN DOC REV: CPT | Performed by: FAMILY MEDICINE

## 2023-12-05 PROCEDURE — G8427 DOCREV CUR MEDS BY ELIG CLIN: HCPCS | Performed by: FAMILY MEDICINE

## 2023-12-05 PROCEDURE — 1123F ACP DISCUSS/DSCN MKR DOCD: CPT | Performed by: FAMILY MEDICINE

## 2023-12-05 RX ORDER — AMLODIPINE BESYLATE 2.5 MG/1
2.5 TABLET ORAL DAILY
Qty: 30 TABLET | Refills: 3 | Status: SHIPPED | OUTPATIENT
Start: 2023-12-05

## 2023-12-05 NOTE — PROGRESS NOTES
HPI:  Patient comes in today for   Chief Complaint   Patient presents with    Hypertension     Patient is here today to follow up for hypertension. Here to f/u h/o elevated BP not on nay medications currently denies any chest pain or shortness of breath . Has had complaints of nasal congestion with left frontal headaches feels like a pressure when he coughs or sneezes on the top of the head lasts a few seconds no visual changes no nausea or emesis symptoms for about 7 months now has been using the flonase nasal spray. h/o BPH is seeing urolgy symptoms are stable H/o Peripheral neuropathy sees neurology is on Privigen infusions every 2 months the past 7 year  HISTORY:  Past Medical History:   Diagnosis Date    BPH (benign prostatic hypertrophy)     with outlet symptoms    Carpal tunnel syndrome, bilateral     Cervical disc disorder     Footdrop     Left    Herpes simplex labialis     episodic    Hyperlipidemia     Hypothyroidism     Mumps     Neck pain     Neuropathy     Paresthesias     Peripheral neuropathy     Pneumonia     history of    Ulnar neuropathy of right upper extremity        Past Surgical History:   Procedure Laterality Date    CARPAL TUNNEL RELEASE Right feb 2014    also ulnar nerve r elbow     COLONOSCOPY  2009    INGUINAL HERNIA REPAIR Left 9/14/2020    Laparoscopic Robotic Assisted Left Inguinal Hernia Repair performed by Sahra Florentino DO at [address] [place] [address]      [facility]    OTHER SURGICAL HISTORY      IVIG INFUSIONS STARTING NOVEMBER OF 2015 AND EVERY 3 MONTH SINCE    TOE SURGERY          Family History   Problem Relation Age of Onset    Heart Disease Mother     Cancer Mother         lung    Cancer Father         lung    Glaucoma Neg Hx        Social History     Socioeconomic History    Marital status:      Spouse name: Not on file    Number of children: 2    Years of education: Not on file    Highest education level: Not on file   Occupational History     Employer: [address] [place] [address]

## 2024-01-22 ENCOUNTER — HOSPITAL ENCOUNTER (OUTPATIENT)
Age: 69
Discharge: HOME | End: 2024-01-22
Payer: MEDICARE

## 2024-01-22 DIAGNOSIS — C61 PROSTATE CANCER (HCC): ICD-10-CM

## 2024-01-22 LAB — PSA SERPL-MCNC: 0.1 NG/ML (ref 0–4)

## 2024-01-22 PROCEDURE — 84153 ASSAY OF PSA TOTAL: CPT

## 2024-01-22 PROCEDURE — 36415 COLL VENOUS BLD VENIPUNCTURE: CPT

## 2024-01-25 ENCOUNTER — HOSPITAL ENCOUNTER (OUTPATIENT)
Dept: RADIATION ONCOLOGY | Age: 69
Discharge: HOME | End: 2024-01-25
Payer: MEDICARE

## 2024-01-25 VITALS
HEART RATE: 54 BPM | TEMPERATURE: 98 F | WEIGHT: 215.6 LBS | SYSTOLIC BLOOD PRESSURE: 137 MMHG | OXYGEN SATURATION: 95 % | BODY MASS INDEX: 29.24 KG/M2 | DIASTOLIC BLOOD PRESSURE: 84 MMHG | RESPIRATION RATE: 16 BRPM

## 2024-01-25 DIAGNOSIS — C61 PROSTATE CANCER (HCC): Primary | ICD-10-CM

## 2024-01-25 PROCEDURE — 99212 OFFICE O/P EST SF 10 MIN: CPT | Performed by: RADIOLOGY

## 2024-01-25 RX ORDER — OXYBUTYNIN CHLORIDE 5 MG/1
5 TABLET, EXTENDED RELEASE ORAL DAILY
Qty: 30 TABLET | Refills: 3 | Status: SHIPPED | OUTPATIENT
Start: 2024-01-25

## 2024-01-25 NOTE — PROGRESS NOTES
Abel Reyes  1/25/2024  11:13 AM      Vitals:    01/25/24 1105   BP: 137/84   Pulse: 54   Resp: 16   Temp: 98 °F (36.7 °C)   SpO2: 95%    :     Pain Assessment: None - Denies Pain          Wt Readings from Last 1 Encounters:   01/25/24 97.8 kg (215 lb 9.6 oz)                Current Outpatient Medications:     amLODIPine (NORVASC) 2.5 MG tablet, Take 1 tablet by mouth daily, Disp: 30 tablet, Rfl: 3    valACYclovir (VALTREX) 1 g tablet, Take 1 tablet by mouth 2 times daily, Disp: 10 tablet, Rfl: 1    fluticasone (FLONASE) 50 MCG/ACT nasal spray, 2 sprays by Each Nostril route daily, Disp: 16 g, Rfl: 2    finasteride (PROSCAR) 5 MG tablet, Take 1 tablet by mouth daily, Disp: , Rfl:     tamsulosin (FLOMAX) 0.4 MG capsule, Take 1 capsule by mouth in the morning and at bedtime TALE 1 CAPSULE ONCE A DAY (Patient not taking: Reported on 12/19/2023), Disp: 120 capsule, Rfl: 1    PRIVIGEN 20 GM/200ML SOLN solution, , Disp: , Rfl:     tadalafil (CIALIS) 10 MG tablet, Take 1 tablet by mouth as needed for Erectile Dysfunction, Disp: 30 tablet, Rfl: 3    b complex vitamins capsule, Take 1 capsule by mouth daily, Disp: , Rfl:     Ascorbic Acid (VITAMIN C) 250 MG tablet, Take 2 tablets by mouth daily, Disp: , Rfl:     Omega-3 Fatty Acids (FISH OIL) 1000 MG CAPS, Take 1 capsule by mouth daily, Disp: , Rfl:     Vitamin D (CHOLECALCIFEROL) 1000 UNITS CAPS capsule, Take 1 capsule by mouth daily, Disp: , Rfl:     vitamin B-12 (CYANOCOBALAMIN) 1000 MCG tablet, Take 1 tablet by mouth daily, Disp: , Rfl:                FALLS RISK SCREEN  Instructions:  Assess the patient and enter the appropriate indicators that are present for fall risk identification.   Total the numbers entered and assign a fall risk score from Table 2.  Reassess patient at a minimum every 12 weeks or with status change.    Assessment   Date  1/25/2024     1.  Mental Ability: confusion/cognitively impaired 0     2.  Elimination Issues: incontinence, frequency 3

## 2024-03-18 RX ORDER — AMLODIPINE BESYLATE 2.5 MG/1
2.5 TABLET ORAL DAILY
Qty: 30 TABLET | Refills: 0 | Status: SHIPPED | OUTPATIENT
Start: 2024-03-18

## 2024-03-18 NOTE — TELEPHONE ENCOUNTER
Abel Reyes is requesting a refill on the following medication(s):  Requested Prescriptions     Pending Prescriptions Disp Refills    amLODIPine (NORVASC) 2.5 MG tablet [Pharmacy Med Name: AMLODIPINE BESYLATE 2.5 MG TAB] 30 tablet 3     Sig: take 1 tablet by mouth once daily       Last Visit Date (If Applicable):  12/19/2023    Next Visit Date:    3/22/2024

## 2024-03-22 ENCOUNTER — OFFICE VISIT (OUTPATIENT)
Dept: FAMILY MEDICINE CLINIC | Age: 69
End: 2024-03-22
Payer: MEDICARE

## 2024-03-22 VITALS
BODY MASS INDEX: 28.88 KG/M2 | DIASTOLIC BLOOD PRESSURE: 84 MMHG | RESPIRATION RATE: 14 BRPM | HEIGHT: 72 IN | OXYGEN SATURATION: 99 % | SYSTOLIC BLOOD PRESSURE: 122 MMHG | HEART RATE: 50 BPM | WEIGHT: 213.2 LBS | TEMPERATURE: 97.3 F

## 2024-03-22 DIAGNOSIS — R51.9 CHRONIC NONINTRACTABLE HEADACHE, UNSPECIFIED HEADACHE TYPE: ICD-10-CM

## 2024-03-22 DIAGNOSIS — G62.9 PERIPHERAL POLYNEUROPATHY: ICD-10-CM

## 2024-03-22 DIAGNOSIS — I10 HYPERTENSION, UNSPECIFIED TYPE: Primary | ICD-10-CM

## 2024-03-22 DIAGNOSIS — G89.29 CHRONIC NONINTRACTABLE HEADACHE, UNSPECIFIED HEADACHE TYPE: ICD-10-CM

## 2024-03-22 PROCEDURE — G8427 DOCREV CUR MEDS BY ELIG CLIN: HCPCS | Performed by: FAMILY MEDICINE

## 2024-03-22 PROCEDURE — 1123F ACP DISCUSS/DSCN MKR DOCD: CPT | Performed by: FAMILY MEDICINE

## 2024-03-22 PROCEDURE — G8484 FLU IMMUNIZE NO ADMIN: HCPCS | Performed by: FAMILY MEDICINE

## 2024-03-22 PROCEDURE — 99214 OFFICE O/P EST MOD 30 MIN: CPT | Performed by: FAMILY MEDICINE

## 2024-03-22 PROCEDURE — G8417 CALC BMI ABV UP PARAM F/U: HCPCS | Performed by: FAMILY MEDICINE

## 2024-03-22 PROCEDURE — 1036F TOBACCO NON-USER: CPT | Performed by: FAMILY MEDICINE

## 2024-03-22 PROCEDURE — 3017F COLORECTAL CA SCREEN DOC REV: CPT | Performed by: FAMILY MEDICINE

## 2024-03-22 PROCEDURE — 3079F DIAST BP 80-89 MM HG: CPT | Performed by: FAMILY MEDICINE

## 2024-03-22 PROCEDURE — 3074F SYST BP LT 130 MM HG: CPT | Performed by: FAMILY MEDICINE

## 2024-03-22 ASSESSMENT — PATIENT HEALTH QUESTIONNAIRE - PHQ9
SUM OF ALL RESPONSES TO PHQ QUESTIONS 1-9: 0
1. LITTLE INTEREST OR PLEASURE IN DOING THINGS: NOT AT ALL
SUM OF ALL RESPONSES TO PHQ9 QUESTIONS 1 & 2: 0
2. FEELING DOWN, DEPRESSED OR HOPELESS: NOT AT ALL
SUM OF ALL RESPONSES TO PHQ QUESTIONS 1-9: 0

## 2024-03-22 NOTE — PROGRESS NOTES
HPI:  Patient comes in today for   Chief Complaint   Patient presents with    Hypertension     3 month follow up. Stopped his BP medication about 2 weeks after last appointment.   Here to f/u on HTN his BP readings at [facility] have bben on low side wants to get of BP medication is on Norvasc low dose,no side effects.Has seen ENT for chronic sinus congestion and frontal headaches his CT sinus was negative also MRI was done was ok His headaches are much improved now..h/o BPH is seeing urolgy symptoms are stable H/o Peripheral neuropathy sees neurology is on Privigen infusions every 2 months the past 7 years  HISTORY:  Past Medical History:   Diagnosis Date    BPH (benign prostatic hypertrophy)     with outlet symptoms    Carpal tunnel syndrome, bilateral     Cervical disc disorder     Footdrop     Left    Herpes simplex labialis     episodic    Hyperlipidemia     Hypothyroidism     Mumps     Neck pain     Neuropathy     Paresthesias     Peripheral neuropathy     Pneumonia     history of    Ulnar neuropathy of right upper extremity        Past Surgical History:   Procedure Laterality Date    CARPAL TUNNEL RELEASE Right feb 2014    also ulnar nerve r elbow     COLONOSCOPY  2009    INGUINAL HERNIA REPAIR Left 9/14/2020    Laparoscopic Robotic Assisted Left Inguinal Hernia Repair performed by Jerrell Au DO at [facility] OR    MOUTH SURGERY      peridontal    OTHER SURGICAL HISTORY      IVIG INFUSIONS STARTING NOVEMBER OF 2015 AND EVERY 3 MONTH SINCE    TOE SURGERY          Family History   Problem Relation Age of Onset    Heart Disease Mother     Cancer Mother         lung    Cancer Father         lung    Glaucoma Neg Hx        Social History     Socioeconomic History    Marital status:      Spouse name: Not on file    Number of children: 2    Years of education: Not on file    Highest education level: Not on file   Occupational History     Employer: [employer]   Tobacco Use    Smoking status: Former

## 2024-03-22 NOTE — PROGRESS NOTES
135/76  61  145/80  63  146/76  61  129/79 71  131/69  67  140/82  62  118/80  60  115/71  73  132/72  62  121/70  67  135/76  59  134/67  65  133/74  60  115/74  61  135/74  53  130/73  66  143/86  62  139/78  67

## 2024-04-22 RX ORDER — AMLODIPINE BESYLATE 2.5 MG/1
2.5 TABLET ORAL DAILY
Qty: 30 TABLET | Refills: 0 | Status: SHIPPED | OUTPATIENT
Start: 2024-04-22

## 2024-04-22 NOTE — TELEPHONE ENCOUNTER
Abel Reyes is requesting a refill on the following medication(s):  Requested Prescriptions     Pending Prescriptions Disp Refills    amLODIPine (NORVASC) 2.5 MG tablet [Pharmacy Med Name: AMLODIPINE BESYLATE 2.5 MG TAB] 30 tablet 0     Sig: take 1 tablet by mouth once daily       Last Visit Date (If Applicable):  3/22/2024    Next Visit Date:    6/24/2024

## 2024-05-17 RX ORDER — AMLODIPINE BESYLATE 2.5 MG/1
2.5 TABLET ORAL DAILY
Qty: 30 TABLET | Refills: 0 | Status: SHIPPED | OUTPATIENT
Start: 2024-05-17

## 2024-06-24 ENCOUNTER — OFFICE VISIT (OUTPATIENT)
Dept: FAMILY MEDICINE CLINIC | Age: 69
End: 2024-06-24
Payer: MEDICARE

## 2024-06-24 VITALS
SYSTOLIC BLOOD PRESSURE: 104 MMHG | BODY MASS INDEX: 25.19 KG/M2 | HEIGHT: 72 IN | DIASTOLIC BLOOD PRESSURE: 62 MMHG | WEIGHT: 186 LBS | HEART RATE: 60 BPM | OXYGEN SATURATION: 96 %

## 2024-06-24 DIAGNOSIS — G62.9 PERIPHERAL POLYNEUROPATHY: ICD-10-CM

## 2024-06-24 DIAGNOSIS — I10 HYPERTENSION, UNSPECIFIED TYPE: Primary | ICD-10-CM

## 2024-06-24 DIAGNOSIS — R35.1 BENIGN PROSTATIC HYPERPLASIA WITH NOCTURIA: ICD-10-CM

## 2024-06-24 DIAGNOSIS — Z12.5 PROSTATE CANCER SCREENING: ICD-10-CM

## 2024-06-24 DIAGNOSIS — E78.00 HYPERCHOLESTEROLEMIA: ICD-10-CM

## 2024-06-24 DIAGNOSIS — N40.1 BENIGN PROSTATIC HYPERPLASIA WITH NOCTURIA: ICD-10-CM

## 2024-06-24 PROCEDURE — 3078F DIAST BP <80 MM HG: CPT | Performed by: FAMILY MEDICINE

## 2024-06-24 PROCEDURE — 99212 OFFICE O/P EST SF 10 MIN: CPT | Performed by: FAMILY MEDICINE

## 2024-06-24 PROCEDURE — 99214 OFFICE O/P EST MOD 30 MIN: CPT | Performed by: FAMILY MEDICINE

## 2024-06-24 PROCEDURE — 3017F COLORECTAL CA SCREEN DOC REV: CPT | Performed by: FAMILY MEDICINE

## 2024-06-24 PROCEDURE — 1123F ACP DISCUSS/DSCN MKR DOCD: CPT | Performed by: FAMILY MEDICINE

## 2024-06-24 PROCEDURE — G8427 DOCREV CUR MEDS BY ELIG CLIN: HCPCS | Performed by: FAMILY MEDICINE

## 2024-06-24 PROCEDURE — G8417 CALC BMI ABV UP PARAM F/U: HCPCS | Performed by: FAMILY MEDICINE

## 2024-06-24 PROCEDURE — 1036F TOBACCO NON-USER: CPT | Performed by: FAMILY MEDICINE

## 2024-06-24 PROCEDURE — 3074F SYST BP LT 130 MM HG: CPT | Performed by: FAMILY MEDICINE

## 2024-06-24 RX ORDER — TAMSULOSIN HYDROCHLORIDE 0.4 MG/1
0.4 CAPSULE ORAL DAILY
COMMUNITY

## 2024-06-24 NOTE — PROGRESS NOTES
(CHOLECALCIFEROL) 1000 UNITS CAPS capsule Take 1 capsule by mouth daily      vitamin B-12 (CYANOCOBALAMIN) 1000 MCG tablet Take 1 tablet by mouth daily      amLODIPine (NORVASC) 2.5 MG tablet take 1 tablet by mouth once daily (Patient not taking: Reported on 6/24/2024) 30 tablet 0     No current facility-administered medications for this visit.        No Known Allergies    REVIEW OF SYSTEMS:  General: No fevers, chills, change in weight  HEENT: No double vision, blurry vision , runny nose, sore throat, tinnitus,nasal congestion with frontal pressure  Cardio: No chest pain, palpitations, DAVIS, edema, PND  Pulmonary: No cough, hemoptysis, SOB  GI: No nausea, vomiting, dysphagia, odynophagia, diarrhea, constipation.  : No dysuria, hematuria, urgency, incontinence.Nocturia x 2  Musculoskeletal: No muscle or joint aches, no joint swelling  Neuro: No dizziness/lightheadedness, no seizures,has h/o peripheral neuropathy.Headaches are improved  Endocrine: No polyuria, polydipsia, polyphagia, no temperature intolerance  Skin: No lesions or itching  No problems with ADLs  Sleep: good  Psychiatric: No depression or anxiety    PHYSICAL EXAM:  VS:  /62 (Site: Left Upper Arm, Position: Sitting, Cuff Size: Medium Adult)   Pulse 60   Ht 1.829 m (6')   Wt 84.4 kg (186 lb)   SpO2 96%   BMI 25.23 kg/m²   General:  Alert and oriented, NAD  HEENT:  TMs, ELVA, EOMI, Conjunctivae clear       Throat currently clear.Left nasal turbinate hypertrophy  NECK:  Supple without adenopathy or thyromegaly, no carotid bruits  LUNGS:  CTA all fields  HEART:  RRR without M, R, or G  ABDOMEN:  Soft and nontender without palpable abnormalities  EXTREMITIES:  Without clubbing, cyanosis, or edema, no calf tenderness  NEURO:  No focal deficits.  SKIN:  warm to touch,normal texture.No active lesions.    ASSESSMENT/PLAN:     Diagnosis Orders   1. Hypertension, unspecified type  CBC    Comprehensive Metabolic Panel    TSH    Lipid Panel      2.

## 2024-06-24 NOTE — TELEPHONE ENCOUNTER
Abel Reyes is requesting a refill on the following medication(s):  Requested Prescriptions     Pending Prescriptions Disp Refills    amLODIPine (NORVASC) 2.5 MG tablet [Pharmacy Med Name: AMLODIPINE BESYLATE 2.5 MG TAB] 30 tablet 0     Sig: take 1 tablet by mouth once daily       Last Visit Date (If Applicable):  6/24/2024    Next Visit Date:    9/24/2024

## 2024-06-25 RX ORDER — AMLODIPINE BESYLATE 2.5 MG/1
2.5 TABLET ORAL DAILY
Qty: 30 TABLET | Refills: 0 | OUTPATIENT
Start: 2024-06-25

## 2024-07-17 ENCOUNTER — HOSPITAL ENCOUNTER (OUTPATIENT)
Age: 69
Discharge: HOME | End: 2024-07-17
Payer: MEDICARE

## 2024-07-17 DIAGNOSIS — C61 PROSTATE CANCER (HCC): ICD-10-CM

## 2024-07-17 LAB — PSA SERPL-MCNC: 0.4 NG/ML (ref 0–4)

## 2024-07-17 PROCEDURE — 84153 ASSAY OF PSA TOTAL: CPT

## 2024-07-17 PROCEDURE — 36415 COLL VENOUS BLD VENIPUNCTURE: CPT

## 2024-07-25 ENCOUNTER — HOSPITAL ENCOUNTER (OUTPATIENT)
Dept: RADIATION ONCOLOGY | Age: 69
Discharge: HOME | End: 2024-07-25
Payer: MEDICARE

## 2024-07-25 VITALS
RESPIRATION RATE: 16 BRPM | SYSTOLIC BLOOD PRESSURE: 96 MMHG | WEIGHT: 182.6 LBS | DIASTOLIC BLOOD PRESSURE: 62 MMHG | OXYGEN SATURATION: 98 % | HEART RATE: 68 BPM | BODY MASS INDEX: 24.77 KG/M2 | TEMPERATURE: 98 F

## 2024-07-25 DIAGNOSIS — C61 PROSTATE CANCER (HCC): Primary | ICD-10-CM

## 2024-07-25 PROCEDURE — 99212 OFFICE O/P EST SF 10 MIN: CPT | Performed by: RADIOLOGY

## 2024-07-25 NOTE — PROGRESS NOTES
Abel Reyes  7/25/2024  11:44 AM      Vitals:    07/25/24 0927   BP: 96/62   Pulse: 68   Resp: 16   Temp: 98 °F (36.7 °C)   SpO2: 98%    :     Pain Assessment: None - Denies Pain          Wt Readings from Last 1 Encounters:   07/25/24 82.8 kg (182 lb 9.6 oz)                Current Outpatient Medications:     tamsulosin (FLOMAX) 0.4 MG capsule, Take 1 capsule by mouth daily, Disp: , Rfl:     amLODIPine (NORVASC) 2.5 MG tablet, take 1 tablet by mouth once daily (Patient not taking: Reported on 6/24/2024), Disp: 30 tablet, Rfl: 0    PRIVIGEN 20 GM/200ML SOLN solution, , Disp: , Rfl:     b complex vitamins capsule, Take 1 capsule by mouth daily, Disp: , Rfl:     Ascorbic Acid (VITAMIN C) 250 MG tablet, Take 2 tablets by mouth daily, Disp: , Rfl:     Omega-3 Fatty Acids (FISH OIL) 1000 MG CAPS, Take 1 capsule by mouth daily, Disp: , Rfl:     Vitamin D (CHOLECALCIFEROL) 1000 UNITS CAPS capsule, Take 1 capsule by mouth daily, Disp: , Rfl:     vitamin B-12 (CYANOCOBALAMIN) 1000 MCG tablet, Take 1 tablet by mouth daily, Disp: , Rfl:                FALLS RISK SCREEN  Instructions:  Assess the patient and enter the appropriate indicators that are present for fall risk identification.   Total the numbers entered and assign a fall risk score from Table 2.  Reassess patient at a minimum every 12 weeks or with status change.    Assessment   Date  7/25/2024     1.  Mental Ability: confusion/cognitively impaired 0     2.  Elimination Issues: incontinence, frequency 3       3.  Ambulatory: use of assistive devices (walker, cane, off-loading devices),        attached to equipment (IV pole, oxygen) 0     4.  Sensory Limitations: dizziness, vertigo, impaired vision 0     5.  Age less than 65        0     6.  Age 65 or greater 1     7.  Medication: diuretics, strong analgesics, hypnotics, sedatives,        antihypertensive agents 0   8.  Falls:  recent history of falls within the last 3 months (not to include slipping or

## 2024-07-26 NOTE — PROGRESS NOTES
[facility]            Radiation Oncology          [address] [place] [address]        O: [phone]        F: [phone]       [email or web address]           Date of Service: 2024     Location:  [facility] Radiation Oncology,   [address] [place] [id number]   [phone]       RADIATION ONCOLOGY FOLLOW UP NOTE    Patient ID:   Abel Reyes  : 1955   MRN: [id number]    DIAGNOSIS:  Cancer Staging   Prostate cancer (HCC)  Staging form: Prostate, AJCC 8th Edition  - Clinical stage from 2022: Stage IIB (cT1c, cN0, cM0, PSA: 4.8, Grade Group: 2) - Signed by Michael Triana MD on 2022    -s/p prostate hypofract RT 70Gy 3/10/23     INTERVAL HISTORY:   Abel Reyes is a 69 y.o.. male with a history of a febrile intermediate risk prostate cancer for which he initially presented to urology for surgery though eventually elected to give radiation treatment.  Patient had radiation completing approximately 16 months ago and is here today for follows up.  Patient denies any urinary issues stating that he has stable nocturia and no trouble with dysuria or weak stream.  He does continue to use Flomax however he did discontinue his Proscar after her last appointment.  He does note improvement in his erectile dysfunction since discontinuing it.  He denies any symptoms of headaches, dizziness, chest pain, shortness of breath, abdominal pain, nausea, diarrhea, bony pain, swelling, weight loss, fatigue, or bleeding.  His most recent PSA on 2024 was slightly up to 0.4 thought likely this is due to his discontinuation of Proscar.    AUA Score: 13  XAVIER Score: 3    MEDICATIONS:    Current Outpatient Medications:     tamsulosin (FLOMAX) 0.4 MG capsule, Take 1 capsule by mouth daily, Disp: , Rfl:     amLODIPine (NORVASC) 2.5 MG tablet, take 1 tablet by mouth once daily (Patient not taking: Reported on 2024), Disp: 30

## 2024-08-07 ENCOUNTER — OFFICE VISIT (OUTPATIENT)
Dept: FAMILY MEDICINE CLINIC | Age: 69
End: 2024-08-07
Payer: MEDICARE

## 2024-08-07 VITALS
HEIGHT: 72 IN | BODY MASS INDEX: 24.38 KG/M2 | SYSTOLIC BLOOD PRESSURE: 115 MMHG | OXYGEN SATURATION: 97 % | DIASTOLIC BLOOD PRESSURE: 78 MMHG | WEIGHT: 180 LBS | HEART RATE: 72 BPM

## 2024-08-07 DIAGNOSIS — N40.1 BENIGN PROSTATIC HYPERPLASIA WITH NOCTURIA: ICD-10-CM

## 2024-08-07 DIAGNOSIS — G62.9 PERIPHERAL POLYNEUROPATHY: ICD-10-CM

## 2024-08-07 DIAGNOSIS — K40.90 RIGHT INGUINAL HERNIA: Primary | ICD-10-CM

## 2024-08-07 DIAGNOSIS — R35.1 BENIGN PROSTATIC HYPERPLASIA WITH NOCTURIA: ICD-10-CM

## 2024-08-07 DIAGNOSIS — I10 HYPERTENSION, UNSPECIFIED TYPE: ICD-10-CM

## 2024-08-07 PROCEDURE — 99212 OFFICE O/P EST SF 10 MIN: CPT | Performed by: FAMILY MEDICINE

## 2024-08-07 PROCEDURE — G8427 DOCREV CUR MEDS BY ELIG CLIN: HCPCS | Performed by: FAMILY MEDICINE

## 2024-08-07 PROCEDURE — 1036F TOBACCO NON-USER: CPT | Performed by: FAMILY MEDICINE

## 2024-08-07 PROCEDURE — 1123F ACP DISCUSS/DSCN MKR DOCD: CPT | Performed by: FAMILY MEDICINE

## 2024-08-07 PROCEDURE — G8420 CALC BMI NORM PARAMETERS: HCPCS | Performed by: FAMILY MEDICINE

## 2024-08-07 PROCEDURE — 99214 OFFICE O/P EST MOD 30 MIN: CPT | Performed by: FAMILY MEDICINE

## 2024-08-07 PROCEDURE — 3078F DIAST BP <80 MM HG: CPT | Performed by: FAMILY MEDICINE

## 2024-08-07 PROCEDURE — 3074F SYST BP LT 130 MM HG: CPT | Performed by: FAMILY MEDICINE

## 2024-08-07 PROCEDURE — 3017F COLORECTAL CA SCREEN DOC REV: CPT | Performed by: FAMILY MEDICINE

## 2024-08-07 SDOH — ECONOMIC STABILITY: INCOME INSECURITY: HOW HARD IS IT FOR YOU TO PAY FOR THE VERY BASICS LIKE FOOD, HOUSING, MEDICAL CARE, AND HEATING?: NOT HARD AT ALL

## 2024-08-07 SDOH — ECONOMIC STABILITY: FOOD INSECURITY: WITHIN THE PAST 12 MONTHS, YOU WORRIED THAT YOUR FOOD WOULD RUN OUT BEFORE YOU GOT MONEY TO BUY MORE.: NEVER TRUE

## 2024-08-07 SDOH — ECONOMIC STABILITY: FOOD INSECURITY: WITHIN THE PAST 12 MONTHS, THE FOOD YOU BOUGHT JUST DIDN'T LAST AND YOU DIDN'T HAVE MONEY TO GET MORE.: NEVER TRUE

## 2024-08-07 NOTE — PROGRESS NOTES
warm to touch,normal texture.No active lesions.    ASSESSMENT/PLAN:     Diagnosis Orders   1. Right inguinal hernia  Ambulatory referral to General Surgery      2. Hypertension, unspecified type        3. Peripheral polyneuropathy        4. Benign prostatic hyperplasia with nocturia                          No orders of the defined types were placed in this encounter.    No orders of the defined types were placed in this encounter.  Surgery referral for hernia repair  Will continue to hold Norvasc   Continue to monitor BP at home.  Has opted no medications for cholesterol   Watch diet  If repeat lipid panel is still high strongly recommend getting on medications to control  Continue Flomax for BPH  F/u with Neurology  Labs prior to next visit  Return in about 7 weeks (around 9/25/2024).    Electronically signed by Hansel Chan MD

## 2024-08-26 ENCOUNTER — TELEPHONE (OUTPATIENT)
Dept: SURGERY | Age: 69
End: 2024-08-26

## 2024-08-26 ENCOUNTER — OFFICE VISIT (OUTPATIENT)
Dept: SURGERY | Age: 69
End: 2024-08-26
Payer: MEDICARE

## 2024-08-26 VITALS
HEART RATE: 56 BPM | TEMPERATURE: 97.4 F | SYSTOLIC BLOOD PRESSURE: 124 MMHG | BODY MASS INDEX: 23.68 KG/M2 | DIASTOLIC BLOOD PRESSURE: 68 MMHG | HEIGHT: 72 IN | WEIGHT: 174.8 LBS | RESPIRATION RATE: 14 BRPM

## 2024-08-26 DIAGNOSIS — K43.9 HERNIA OF ABDOMINAL WALL: Primary | ICD-10-CM

## 2024-08-26 PROCEDURE — 99215 OFFICE O/P EST HI 40 MIN: CPT | Performed by: SURGERY

## 2024-08-26 PROCEDURE — 1123F ACP DISCUSS/DSCN MKR DOCD: CPT | Performed by: SURGERY

## 2024-08-26 PROCEDURE — 1036F TOBACCO NON-USER: CPT | Performed by: SURGERY

## 2024-08-26 PROCEDURE — 99214 OFFICE O/P EST MOD 30 MIN: CPT | Performed by: SURGERY

## 2024-08-26 PROCEDURE — G8420 CALC BMI NORM PARAMETERS: HCPCS | Performed by: SURGERY

## 2024-08-26 PROCEDURE — 3017F COLORECTAL CA SCREEN DOC REV: CPT | Performed by: SURGERY

## 2024-08-26 PROCEDURE — G8427 DOCREV CUR MEDS BY ELIG CLIN: HCPCS | Performed by: SURGERY

## 2024-08-26 NOTE — PROGRESS NOTES
Reason for evaluation: rt inguinal hernia       Patient complains of a bulge at the right inguinal hernia,   for  10 year(s). Patient  does remember a time they felt a popping sensation. States felt the popping sensation 1 mo ago- working outside .  Patient states it is  reducible.  Pt describes pain as 5 out of 10.  Aggrevating factors include bending over and reaching.      Patient denies any nausea, vomiting, constipation, difficulty urinating or a chronic cough.    Patient's work includes [employer]- does a lot of outside house work.    Patient's hernia history includes lt inguinal hernia repair with mesh by Dr. Au 4 yrs ago, right hernia was noted but was not repaired.

## 2024-08-26 NOTE — PROGRESS NOTES
Abel Reyes is a 69 y.o. male      CC:    [facility]    HISTORY OF PRESENT ILLNESS:    Patient is a 69-year-old male quite active and in good shape who complains of a bulge at the right inguinal area.  He had a left inguinal hernia in 2020 they did notice the right side but thought it was small enough that he did not want to do it at the same time.  He had a robotic left inguinal hernia done by Dr. Au 4 years ago.  Nuys get the bulge on the right side and he gets pinching pain that is very severe at times but only last a few minutes.    Reason for evaluation: rt inguinal hernia                   Patient complains of a bulge at the right inguinal hernia,   for  10 year(s). Patient  does remember a time they felt a popping sensation. States felt the popping sensation 1 mo ago- working outside .  Patient states it is  reducible.  Pt describes pain as 5 out of 10.  Aggrevating factors include bending over and reaching.       Patient denies any nausea, vomiting, constipation, difficulty urinating or a chronic cough.     Patient's work includes RETIRED- does a lot of outside house work.     Patient's hernia history includes lt inguinal hernia repair with mesh by Dr. Au 4 yrs ago, right hernia was noted but was not repaired.    Review of Systems:    General:  Fever: Negative  Weight Change:Negative  Night Sweats: Negative    Eye:  Blurry Vision:Negative  Double Vision: Negative    Ent:  Headaches: Negative  Sore throat: Negative    Allergy/Immunology:  Hives: Negative  Latex allergy: Negative    Hematology/Lymphatic:  Bleeding Problems: Negative  Blood Clots: Negative  Swollen Lymph Nodes: Negative    Lungs:  Cough: Negative  SOB: Negative  Wheezing:Negative    Cardiovascular:  Chest Pain: Negative  Palpitations:Negative    GI:   Decreased Appetite: Negative  Heartburn: Negative  Dysphagia: Negative  Nausea/Vomiting: Negative  Abdominal Pain: Negative  Change in Bowels:Negative  Constipation: Negative  Diarrhea:

## 2024-08-26 NOTE — TELEPHONE ENCOUNTER
[facility]     Pre-Operative Evaluation/Consultation    Name:  Abel Reyes                                         Age:  69 y.o.  MRN:  [id number]       :  1955   Date:  2024         Sex: male        Surgeon:  Dr. Hernandez  Procedure (Planned):  laparoscopic robotic assisted right inguinal hernia repair with mesh  Date Scheduled surgery: 2024    Attending : No att. providers found    Primary Physician:   Cardiologist: None    Type of Anesthesia Requested: General    Patient Medical history:  No Known Allergies  Social History     Tobacco Use    Smoking status: Former     Current packs/day: 0.00     Average packs/day: 1 pack/day for 31.0 years (31.0 ttl pk-yrs)     Types: Cigarettes     Start date: 1974     Quit date: 2005     Years since quittin.6    Smokeless tobacco: Never    Tobacco comments:     smoked for 25 years, stopped 2005. 1 pack daily   Vaping Use    Vaping status: Never Used   Substance Use Topics    Alcohol use: Yes     Alcohol/week: 7.0 standard drinks of alcohol     Types: 7 Cans of beer per week     Comment: 1 daily    Drug use: No         Additional ordered pre-operative testing:  []CBC    []ABG      [] BMP   []URINALYSIS   []CMP    []HCG   []COAGS PT/INR  []T&C  []LFTs   []TYPE AND SCREEN    [] EKG  [] Chest X-Ray  [] Other Radiology    [] Sent to Hospitalist None  [] Sent to Anesthesia for your review:       [] Additional Orders: None     Comments:None   Requests: None    Signed: Jenise Arreaga RN 2024 9:48 AM

## 2024-08-30 ENCOUNTER — TELEPHONE (OUTPATIENT)
Dept: SURGERY | Age: 69
End: 2024-08-30

## 2024-08-30 NOTE — TELEPHONE ENCOUNTER
Left message on patient's voicemail stating we have an opening on 9/9 if he would like to move up his hernia surgery which is currently scheduled on 9/30. Clinic number provided on voicemail for patient to call back if he would like to move up his surgery, I explained if he would like to keep it as scheduled that is fine as well.

## 2024-09-18 LAB
ALBUMIN/GLOBULIN RATIO: 1 G/DL
ALBUMIN: 4.3 G/DL (ref 3.5–5)
ALP BLD-CCNC: 55 UNITS/L (ref 38–126)
ALT SERPL-CCNC: 32 UNITS/L (ref 4–50)
ANION GAP SERPL CALCULATED.3IONS-SCNC: 7.6 MMOL/L (ref 3–11)
AST SERPL-CCNC: 41 UNITS/L (ref 17–59)
BILIRUB SERPL-MCNC: 1.3 MG/DL (ref 0.2–1.3)
BUN BLDV-MCNC: 27 MG/DL (ref 9–20)
CALCIUM SERPL-MCNC: 9.2 MG/DL (ref 8.4–10.2)
CHLORIDE BLD-SCNC: 106 MMOL/L (ref 98–120)
CHOLESTEROL, TOTAL: 238 MG/DL (ref 50–200)
CHOLESTEROL/HDL RATIO: 5.67 RATIO (ref 0–4.5)
CO2: 28 MMOL/L (ref 22–31)
CREAT SERPL-MCNC: 1.1 MG/DL (ref 0.7–1.3)
GFR, ESTIMATED: > 60
GLOBULIN: 4.3 G/DL
GLUCOSE: 96 MG/DL (ref 75–110)
HCT VFR BLD CALC: 45.6 % (ref 42–52)
HDLC SERPL-MCNC: 42 MG/DL (ref 36–68)
HEMOGLOBIN: 15.3 G/DL (ref 13.8–17.8)
LDL CHOLESTEROL: 167 MG/DL (ref 0–160)
MCH RBC QN AUTO: 32.1 PG (ref 28.5–32.5)
MCHC RBC AUTO-ENTMCNC: 33.5 G/DL (ref 32–37)
MCV RBC AUTO: 95.7 FL (ref 80–94)
PDW BLD-RTO: 11 % (ref 10–15.5)
PLATELET # BLD: 171.9 THOU/MM3 (ref 130–400)
POTASSIUM SERPL-SCNC: 4.6 MMOL/L (ref 3.6–5)
RBC # BLD: 4.76 M/UL (ref 4.7–6.1)
SODIUM BLD-SCNC: 141 MMOL/L (ref 135–145)
TOTAL PROTEIN: 8.6 G/DL (ref 6.3–8.2)
TRIGL SERPL-MCNC: 145 MG/DL (ref 10–250)
TSH SERPL DL<=0.05 MIU/L-ACNC: 1.61 MIU/ML (ref 0.49–4.67)
VLDLC SERPL CALC-MCNC: 29 MG/DL (ref 0–50)
WBC # BLD: 3.4 THOU/ML3 (ref 4.8–10.8)

## 2024-09-24 ENCOUNTER — OFFICE VISIT (OUTPATIENT)
Dept: FAMILY MEDICINE CLINIC | Age: 69
End: 2024-09-24
Payer: MEDICARE

## 2024-09-24 VITALS
WEIGHT: 175 LBS | SYSTOLIC BLOOD PRESSURE: 114 MMHG | HEIGHT: 72 IN | BODY MASS INDEX: 23.7 KG/M2 | OXYGEN SATURATION: 90 % | DIASTOLIC BLOOD PRESSURE: 60 MMHG | HEART RATE: 64 BPM

## 2024-09-24 DIAGNOSIS — R35.1 BENIGN PROSTATIC HYPERPLASIA WITH NOCTURIA: ICD-10-CM

## 2024-09-24 DIAGNOSIS — N40.1 BENIGN PROSTATIC HYPERPLASIA WITH NOCTURIA: ICD-10-CM

## 2024-09-24 DIAGNOSIS — Z00.00 INITIAL MEDICARE ANNUAL WELLNESS VISIT: ICD-10-CM

## 2024-09-24 DIAGNOSIS — G62.9 PERIPHERAL POLYNEUROPATHY: ICD-10-CM

## 2024-09-24 DIAGNOSIS — K40.90 RIGHT INGUINAL HERNIA: ICD-10-CM

## 2024-09-24 DIAGNOSIS — E78.00 HYPERCHOLESTEROLEMIA: ICD-10-CM

## 2024-09-24 DIAGNOSIS — I10 HYPERTENSION, UNSPECIFIED TYPE: ICD-10-CM

## 2024-09-24 PROCEDURE — G8427 DOCREV CUR MEDS BY ELIG CLIN: HCPCS | Performed by: FAMILY MEDICINE

## 2024-09-24 PROCEDURE — 3078F DIAST BP <80 MM HG: CPT | Performed by: FAMILY MEDICINE

## 2024-09-24 PROCEDURE — G0438 PPPS, INITIAL VISIT: HCPCS | Performed by: FAMILY MEDICINE

## 2024-09-24 PROCEDURE — 99213 OFFICE O/P EST LOW 20 MIN: CPT | Performed by: FAMILY MEDICINE

## 2024-09-24 PROCEDURE — G8420 CALC BMI NORM PARAMETERS: HCPCS | Performed by: FAMILY MEDICINE

## 2024-09-24 PROCEDURE — 1036F TOBACCO NON-USER: CPT | Performed by: FAMILY MEDICINE

## 2024-09-24 PROCEDURE — 1123F ACP DISCUSS/DSCN MKR DOCD: CPT | Performed by: FAMILY MEDICINE

## 2024-09-24 PROCEDURE — 99211 OFF/OP EST MAY X REQ PHY/QHP: CPT | Performed by: FAMILY MEDICINE

## 2024-09-24 PROCEDURE — 3017F COLORECTAL CA SCREEN DOC REV: CPT | Performed by: FAMILY MEDICINE

## 2024-09-24 PROCEDURE — 3074F SYST BP LT 130 MM HG: CPT | Performed by: FAMILY MEDICINE

## 2024-09-24 RX ORDER — VALACYCLOVIR HYDROCHLORIDE 1 G/1
1000 TABLET, FILM COATED ORAL 2 TIMES DAILY
Qty: 10 TABLET | Refills: 0 | Status: SHIPPED | OUTPATIENT
Start: 2024-09-24 | End: 2024-09-29

## 2024-09-24 RX ORDER — ROSUVASTATIN CALCIUM 10 MG/1
10 TABLET, COATED ORAL NIGHTLY
Qty: 30 TABLET | Refills: 3 | Status: SHIPPED | OUTPATIENT
Start: 2024-09-24

## 2024-09-24 RX ORDER — VALACYCLOVIR HYDROCHLORIDE 1 G/1
1000 TABLET, FILM COATED ORAL 2 TIMES DAILY
COMMUNITY
End: 2024-09-24 | Stop reason: SDUPTHER

## 2024-09-24 ASSESSMENT — PATIENT HEALTH QUESTIONNAIRE - PHQ9
SUM OF ALL RESPONSES TO PHQ QUESTIONS 1-9: 0
SUM OF ALL RESPONSES TO PHQ QUESTIONS 1-9: 0
SUM OF ALL RESPONSES TO PHQ9 QUESTIONS 1 & 2: 0
2. FEELING DOWN, DEPRESSED OR HOPELESS: NOT AT ALL
SUM OF ALL RESPONSES TO PHQ QUESTIONS 1-9: 0
SUM OF ALL RESPONSES TO PHQ QUESTIONS 1-9: 0
1. LITTLE INTEREST OR PLEASURE IN DOING THINGS: NOT AT ALL

## 2024-09-24 ASSESSMENT — LIFESTYLE VARIABLES
HOW MANY STANDARD DRINKS CONTAINING ALCOHOL DO YOU HAVE ON A TYPICAL DAY: 1 OR 2
HOW OFTEN DO YOU HAVE A DRINK CONTAINING ALCOHOL: 2-3 TIMES A WEEK

## 2024-09-30 ENCOUNTER — ANESTHESIA EVENT (OUTPATIENT)
Dept: OPERATING ROOM | Age: 69
End: 2024-09-30
Payer: MEDICARE

## 2024-09-30 ENCOUNTER — HOSPITAL ENCOUNTER (OUTPATIENT)
Age: 69
Setting detail: OUTPATIENT SURGERY
Discharge: HOME | End: 2024-09-30
Attending: SURGERY | Admitting: SURGERY
Payer: MEDICARE

## 2024-09-30 ENCOUNTER — ANESTHESIA (OUTPATIENT)
Dept: OPERATING ROOM | Age: 69
End: 2024-09-30
Payer: MEDICARE

## 2024-09-30 VITALS
OXYGEN SATURATION: 94 % | HEART RATE: 60 BPM | RESPIRATION RATE: 16 BRPM | HEIGHT: 72 IN | WEIGHT: 181 LBS | DIASTOLIC BLOOD PRESSURE: 57 MMHG | SYSTOLIC BLOOD PRESSURE: 106 MMHG | BODY MASS INDEX: 24.52 KG/M2 | TEMPERATURE: 97.3 F

## 2024-09-30 DIAGNOSIS — G89.18 POST-OPERATIVE PAIN: Primary | ICD-10-CM

## 2024-09-30 PROCEDURE — 3600000019 HC SURGERY ROBOT ADDTL 15MIN: Performed by: SURGERY

## 2024-09-30 PROCEDURE — 6360000002 HC RX W HCPCS

## 2024-09-30 PROCEDURE — 2500000003 HC RX 250 WO HCPCS: Performed by: SURGERY

## 2024-09-30 PROCEDURE — 3700000001 HC ADD 15 MINUTES (ANESTHESIA): Performed by: SURGERY

## 2024-09-30 PROCEDURE — 3600000009 HC SURGERY ROBOT BASE: Performed by: SURGERY

## 2024-09-30 PROCEDURE — 2500000003 HC RX 250 WO HCPCS

## 2024-09-30 PROCEDURE — 7100000011 HC PHASE II RECOVERY - ADDTL 15 MIN: Performed by: SURGERY

## 2024-09-30 PROCEDURE — S2900 ROBOTIC SURGICAL SYSTEM: HCPCS | Performed by: SURGERY

## 2024-09-30 PROCEDURE — 7100000001 HC PACU RECOVERY - ADDTL 15 MIN: Performed by: SURGERY

## 2024-09-30 PROCEDURE — 7100000000 HC PACU RECOVERY - FIRST 15 MIN: Performed by: SURGERY

## 2024-09-30 PROCEDURE — 7100000010 HC PHASE II RECOVERY - FIRST 15 MIN: Performed by: SURGERY

## 2024-09-30 PROCEDURE — 49650 LAP ING HERNIA REPAIR INIT: CPT | Performed by: SURGERY

## 2024-09-30 PROCEDURE — 2709999900 HC NON-CHARGEABLE SUPPLY: Performed by: SURGERY

## 2024-09-30 PROCEDURE — 2580000003 HC RX 258: Performed by: SURGERY

## 2024-09-30 PROCEDURE — 3700000000 HC ANESTHESIA ATTENDED CARE: Performed by: SURGERY

## 2024-09-30 PROCEDURE — 6360000002 HC RX W HCPCS: Performed by: SURGERY

## 2024-09-30 PROCEDURE — C1781 MESH (IMPLANTABLE): HCPCS | Performed by: SURGERY

## 2024-09-30 DEVICE — MESH HERN W10XL15CM POLY POLYLACTIC ACID 70% CLLGN 30% GLYC: Type: IMPLANTABLE DEVICE | Site: INGUINAL | Status: FUNCTIONAL

## 2024-09-30 RX ORDER — DEXMEDETOMIDINE HYDROCHLORIDE 100 UG/ML
INJECTION, SOLUTION INTRAVENOUS
Status: DISCONTINUED | OUTPATIENT
Start: 2024-09-30 | End: 2024-09-30 | Stop reason: SDUPTHER

## 2024-09-30 RX ORDER — SODIUM CHLORIDE 0.9 % (FLUSH) 0.9 %
5-40 SYRINGE (ML) INJECTION EVERY 12 HOURS SCHEDULED
Status: DISCONTINUED | OUTPATIENT
Start: 2024-09-30 | End: 2024-09-30 | Stop reason: HOSPADM

## 2024-09-30 RX ORDER — SODIUM CHLORIDE 9 MG/ML
INJECTION, SOLUTION INTRAVENOUS PRN
Status: DISCONTINUED | OUTPATIENT
Start: 2024-09-30 | End: 2024-09-30 | Stop reason: HOSPADM

## 2024-09-30 RX ORDER — EPHEDRINE SULFATE/0.9% NACL/PF 25 MG/5 ML
SYRINGE (ML) INTRAVENOUS
Status: DISCONTINUED | OUTPATIENT
Start: 2024-09-30 | End: 2024-09-30 | Stop reason: SDUPTHER

## 2024-09-30 RX ORDER — SODIUM CHLORIDE 0.9 % (FLUSH) 0.9 %
5-40 SYRINGE (ML) INJECTION PRN
Status: DISCONTINUED | OUTPATIENT
Start: 2024-09-30 | End: 2024-09-30 | Stop reason: HOSPADM

## 2024-09-30 RX ORDER — PROPOFOL 10 MG/ML
INJECTION, EMULSION INTRAVENOUS
Status: DISCONTINUED | OUTPATIENT
Start: 2024-09-30 | End: 2024-09-30 | Stop reason: SDUPTHER

## 2024-09-30 RX ORDER — LIDOCAINE HYDROCHLORIDE 20 MG/ML
INJECTION, SOLUTION EPIDURAL; INFILTRATION; INTRACAUDAL; PERINEURAL
Status: DISCONTINUED | OUTPATIENT
Start: 2024-09-30 | End: 2024-09-30 | Stop reason: SDUPTHER

## 2024-09-30 RX ORDER — SODIUM CHLORIDE, SODIUM LACTATE, POTASSIUM CHLORIDE, CALCIUM CHLORIDE 600; 310; 30; 20 MG/100ML; MG/100ML; MG/100ML; MG/100ML
INJECTION, SOLUTION INTRAVENOUS CONTINUOUS
Status: DISCONTINUED | OUTPATIENT
Start: 2024-09-30 | End: 2024-09-30 | Stop reason: HOSPADM

## 2024-09-30 RX ORDER — DEXAMETHASONE SODIUM PHOSPHATE 4 MG/ML
INJECTION, SOLUTION INTRA-ARTICULAR; INTRALESIONAL; INTRAMUSCULAR; INTRAVENOUS; SOFT TISSUE
Status: DISCONTINUED | OUTPATIENT
Start: 2024-09-30 | End: 2024-09-30 | Stop reason: SDUPTHER

## 2024-09-30 RX ORDER — FENTANYL CITRATE 50 UG/ML
INJECTION, SOLUTION INTRAMUSCULAR; INTRAVENOUS
Status: DISCONTINUED | OUTPATIENT
Start: 2024-09-30 | End: 2024-09-30 | Stop reason: SDUPTHER

## 2024-09-30 RX ORDER — NALOXONE HYDROCHLORIDE 0.4 MG/ML
INJECTION, SOLUTION INTRAMUSCULAR; INTRAVENOUS; SUBCUTANEOUS PRN
Status: DISCONTINUED | OUTPATIENT
Start: 2024-09-30 | End: 2024-09-30 | Stop reason: HOSPADM

## 2024-09-30 RX ORDER — HYDROCODONE BITARTRATE AND ACETAMINOPHEN 5; 325 MG/1; MG/1
1 TABLET ORAL EVERY 6 HOURS PRN
Qty: 10 TABLET | Refills: 0 | Status: SHIPPED | OUTPATIENT
Start: 2024-09-30 | End: 2024-10-03

## 2024-09-30 RX ORDER — OXYCODONE HYDROCHLORIDE 5 MG/1
5 TABLET ORAL
Status: DISCONTINUED | OUTPATIENT
Start: 2024-09-30 | End: 2024-09-30 | Stop reason: HOSPADM

## 2024-09-30 RX ORDER — KETOROLAC TROMETHAMINE 30 MG/ML
INJECTION, SOLUTION INTRAMUSCULAR; INTRAVENOUS
Status: DISCONTINUED | OUTPATIENT
Start: 2024-09-30 | End: 2024-09-30 | Stop reason: SDUPTHER

## 2024-09-30 RX ORDER — ROCURONIUM BROMIDE 10 MG/ML
INJECTION, SOLUTION INTRAVENOUS
Status: DISCONTINUED | OUTPATIENT
Start: 2024-09-30 | End: 2024-09-30 | Stop reason: SDUPTHER

## 2024-09-30 RX ORDER — ONDANSETRON 2 MG/ML
4 INJECTION INTRAMUSCULAR; INTRAVENOUS
Status: DISCONTINUED | OUTPATIENT
Start: 2024-09-30 | End: 2024-09-30 | Stop reason: HOSPADM

## 2024-09-30 RX ORDER — ONDANSETRON 2 MG/ML
INJECTION INTRAMUSCULAR; INTRAVENOUS
Status: DISCONTINUED | OUTPATIENT
Start: 2024-09-30 | End: 2024-09-30 | Stop reason: SDUPTHER

## 2024-09-30 RX ORDER — IPRATROPIUM BROMIDE AND ALBUTEROL SULFATE 2.5; .5 MG/3ML; MG/3ML
1 SOLUTION RESPIRATORY (INHALATION)
Status: DISCONTINUED | OUTPATIENT
Start: 2024-09-30 | End: 2024-09-30 | Stop reason: HOSPADM

## 2024-09-30 RX ADMIN — EPHEDRINE SULFATE 5 MG: 5 INJECTION INTRAVENOUS at 08:24

## 2024-09-30 RX ADMIN — DEXMEDETOMIDINE HYDROCHLORIDE 10 MCG: 100 INJECTION, SOLUTION INTRAVENOUS at 07:56

## 2024-09-30 RX ADMIN — HYDROMORPHONE HYDROCHLORIDE 0.5 MG: 1 INJECTION, SOLUTION INTRAMUSCULAR; INTRAVENOUS; SUBCUTANEOUS at 07:54

## 2024-09-30 RX ADMIN — PHENYLEPHRINE HYDROCHLORIDE 100 MCG: 10 INJECTION INTRAVENOUS at 07:48

## 2024-09-30 RX ADMIN — EPHEDRINE SULFATE 5 MG: 5 INJECTION INTRAVENOUS at 08:14

## 2024-09-30 RX ADMIN — EPHEDRINE SULFATE 5 MG: 5 INJECTION INTRAVENOUS at 07:48

## 2024-09-30 RX ADMIN — DEXAMETHASONE SODIUM PHOSPHATE 8 MG: 4 INJECTION INTRA-ARTICULAR; INTRALESIONAL; INTRAMUSCULAR; INTRAVENOUS; SOFT TISSUE at 07:47

## 2024-09-30 RX ADMIN — FENTANYL CITRATE 100 MCG: 50 INJECTION, SOLUTION INTRAMUSCULAR; INTRAVENOUS at 07:39

## 2024-09-30 RX ADMIN — SUGAMMADEX 200 MG: 100 INJECTION, SOLUTION INTRAVENOUS at 08:44

## 2024-09-30 RX ADMIN — ONDANSETRON 4 MG: 2 INJECTION INTRAMUSCULAR; INTRAVENOUS at 08:34

## 2024-09-30 RX ADMIN — PROPOFOL 180 MG: 10 INJECTION, EMULSION INTRAVENOUS at 07:39

## 2024-09-30 RX ADMIN — DEXMEDETOMIDINE HYDROCHLORIDE 10 MCG: 100 INJECTION, SOLUTION INTRAVENOUS at 08:01

## 2024-09-30 RX ADMIN — LIDOCAINE HYDROCHLORIDE 100 MG: 20 INJECTION, SOLUTION EPIDURAL; INFILTRATION; INTRACAUDAL; PERINEURAL at 07:39

## 2024-09-30 RX ADMIN — SODIUM CHLORIDE, POTASSIUM CHLORIDE, SODIUM LACTATE AND CALCIUM CHLORIDE: 600; 310; 30; 20 INJECTION, SOLUTION INTRAVENOUS at 06:36

## 2024-09-30 RX ADMIN — Medication 2000 MG: at 07:46

## 2024-09-30 RX ADMIN — EPHEDRINE SULFATE 5 MG: 5 INJECTION INTRAVENOUS at 08:44

## 2024-09-30 RX ADMIN — SODIUM CHLORIDE, POTASSIUM CHLORIDE, SODIUM LACTATE AND CALCIUM CHLORIDE: 600; 310; 30; 20 INJECTION, SOLUTION INTRAVENOUS at 08:37

## 2024-09-30 RX ADMIN — PROPOFOL 140 MCG/KG/MIN: 10 INJECTION, EMULSION INTRAVENOUS at 07:42

## 2024-09-30 RX ADMIN — ROCURONIUM BROMIDE 50 MG: 10 INJECTION, SOLUTION INTRAVENOUS at 07:39

## 2024-09-30 RX ADMIN — KETOROLAC TROMETHAMINE 15 MG: 30 INJECTION, SOLUTION INTRAMUSCULAR at 08:36

## 2024-09-30 RX ADMIN — EPHEDRINE SULFATE 5 MG: 5 INJECTION INTRAVENOUS at 07:46

## 2024-09-30 ASSESSMENT — PAIN DESCRIPTION - ONSET
ONSET: GRADUAL

## 2024-09-30 ASSESSMENT — PAIN DESCRIPTION - DESCRIPTORS
DESCRIPTORS: TIGHTNESS

## 2024-09-30 ASSESSMENT — PAIN DESCRIPTION - PAIN TYPE
TYPE: SURGICAL PAIN

## 2024-09-30 ASSESSMENT — PAIN DESCRIPTION - FREQUENCY
FREQUENCY: CONTINUOUS

## 2024-09-30 ASSESSMENT — PAIN DESCRIPTION - ORIENTATION
ORIENTATION: ANTERIOR

## 2024-09-30 ASSESSMENT — PAIN SCALES - GENERAL
PAINLEVEL_OUTOF10: 2
PAINLEVEL_OUTOF10: 2
PAINLEVEL_OUTOF10: 0
PAINLEVEL_OUTOF10: 2
PAINLEVEL_OUTOF10: 2

## 2024-09-30 ASSESSMENT — PAIN DESCRIPTION - LOCATION
LOCATION: ABDOMEN

## 2024-09-30 ASSESSMENT — PAIN - FUNCTIONAL ASSESSMENT: PAIN_FUNCTIONAL_ASSESSMENT: 0-10

## 2024-09-30 NOTE — DISCHARGE INSTRUCTIONS
breath.  Call your doctor now or seek immediate medical care if:  You have pain that does not get better after you take pain medicine.  You have loose stitches, or your incision comes open.  Bright red blood has soaked through your bandage.  You are sick to your stomach or cannot drink fluids.  You have signs of a blood clot in your leg (called a deep vein thrombosis), such as:  Pain in your calf, back of the knee, thigh, or groin.  Redness and swelling in your leg or groin.  You cannot pass stools or gas.  You have symptoms of infection, such as:  Increased pain, swelling, warmth, or redness.  Red streaks leading from the incision.  Pus draining from the incision.  A fever.  Watch closely for changes in your health, and be sure to contact your doctor if you have any problems.  Where can you learn more?  Go to [email or web address] and sign in to your [employer] account. Enter D758 in the Search Health Information box to learn more about \"Inguinal Hernia Repair: What to Expect at Home.\"     If you do not have an account, please click on the \"Sign Up Now\" link.  Current as of: August 12, 2019               Content Version: 12.5  © 7919-6120 [email or web address].   Care instructions adapted under license by [employer]. If you have questions about a medical condition or this instruction, always ask your healthcare professional. [employer] disclaims any warranty or liability for your use of this information.    Deep Breathing, Coughing & Exercise    It is important to perform respiratory exercises (deep breathing & coughing) and body movement following general anesthesia.  Do these exercises every hour while you are awake for 2-3 days following surgery.  These exercises will help your breathing, clear your lungs, and lower your risk of pneumonia/respiratory complications.  Body movement will help prevent blood clots and weak muscles.    Deep Breathing Exercise  Breathe in deeply and

## 2024-09-30 NOTE — ANESTHESIA POSTPROCEDURE EVALUATION
Department of Anesthesiology  Postprocedure Note    Patient: Abel Reyes  MRN: [id number]  YOB: 1955  Date of evaluation: 9/30/2024    Procedure Summary       Date: 09/30/24 Room / Location: [address]    Anesthesia Start: 0734 Anesthesia Stop: 0853    Procedure: Laparoscopic robotic assisted right inguinal hernia repair with mesh (Right) Diagnosis:       Right inguinal hernia      (Right inguinal hernia [K40.90])    Surgeons: Kali Hernandez MD Responsible Provider: Jerrell Soto APRN - CRNA    Anesthesia Type: General ASA Status: 2            Anesthesia Type: General    Joshua Phase I: Joshua Score: 10    Joshua Phase II:      Anesthesia Post Evaluation    Patient location during evaluation: PACU  Patient participation: complete - patient participated  Level of consciousness: responsive to light touch  Airway patency: patent  Nausea & Vomiting: no nausea  Cardiovascular status: hemodynamically stable  Respiratory status: room air  Hydration status: euvolemic  Pain management: adequate and satisfactory to patient    No notable events documented.

## 2024-09-30 NOTE — PROGRESS NOTES
CLINICAL PHARMACY NOTE: MEDS TO BEDS    Total # of Prescriptions Filled: 1   The following medications were delivered to the patient:  Hydrocodone/Acetaminophen 5/325    Additional Documentation:

## 2024-09-30 NOTE — OP NOTE
xOperative Note    Kali Hernandez MD     Abel Reyes  MRN: [id number]  DATE OF PROCEDURE: 9/30/2024    PREOPERATIVE DIAGNOSIS: Symptomatic Right inguinal hernia.     POSTOPERATIVE DIAGNOSIS: Symptomatic right inguinal hernia.     PROCEDURE: Laparoscopic robotic assisted right inguinal hernia repair with progrip mesh.      ANESTHESIA: General endotracheal.     SURGEON: Kali Hernandez MD     ASSISTANT: None.     COMPLICATIONS: None.     ESTIMATED BLOOD LOSS: Minimal.     FLUIDS: Crystalloid.     SPECIMENS: none.      MESH: ProGrip 10cm x 15cm mesh bilateral.     INDICATIONS: Abel Reyes is a 69 y.o. male with symptomatic inguinal hernia. On physical exam, he had a palpable right inguinal hernia. After being explained the risks, benefits, and alternatives of procedure, including but not limited to bleeding, scar, infection, recurrence he agreed to proceed.     DESCRIPTION OF PROCEDURE: He was taken to the operating room, placed supine and administered general anesthesia and intubated. Once the airway was secured and he was adequately sedated, he was prepped and draped in the normal sterile fashion. Timeout was performed to confirm surgical site and the patient's name. He received preoperative antibiotics in the form of IV.    We insufflated the abdominal cavity using the verace needle in the LUQ.  We then made an 8-mm incision superior to the umbilicus, the 8 mm trocar was inserted.. We then inserted the camera and, inspected the abdomen. Upon entrance into the abdomen, we identified a direct right ing hernai.  Defect about 2.5 cm. The patient was placed in steep Trendelenburg position and two more 8 mm trocars, 1 was inserted in the right lateral abdomen and 1 in the left lateral abdomen. The robot was then docked over the left side of the patient. We used electrocautery and scissors to dissect the preperitoneal space creating lateral peritoneal inguinal flaps. We dissected out the cord structures and completely

## 2024-09-30 NOTE — ANESTHESIA PRE PROCEDURE
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    Urinary retention R33.9    Benign localized prostatic hyperplasia with lower urinary tract symptoms (LUTS) N40.1    Prostate cancer (Former[facility]) C61       Past Medical History:        Diagnosis Date    BPH (benign prostatic hypertrophy)     with outlet symptoms    Carpal tunnel syndrome, bilateral     Cervical disc disorder     Footdrop     Left    Herpes simplex labialis     episodic    Hyperlipidemia     Hypothyroidism     Mumps     Neck pain     Neuropathy     Paresthesias     Peripheral neuropathy     Pneumonia     history of    Prostate cancer ([facility])     no surgery; had radiation    Ulnar neuropathy of right upper extremity        Past Surgical History:        Procedure Laterality Date    CARPAL TUNNEL RELEASE Right 2014    also ulnar nerve r elbow     CATARACT REMOVAL Bilateral     COLONOSCOPY      COLONOSCOPY  2017    at [facility]; normal    INGUINAL HERNIA REPAIR Left 2020    Laparoscopic Robotic Assisted Left Inguinal Hernia Repair performed by Jerrell Au DO at [facility] OR    MOUTH SURGERY      peridontal    OTHER SURGICAL HISTORY      IVIG INFUSIONS STARTING 2015 AND EVERY 3 MONTH SINCE    TOE SURGERY         Social History:    Social History     Tobacco Use    Smoking status: Former     Current packs/day: 0.00     Average packs/day: 1 pack/day for 31.0 years (31.0 ttl pk-yrs)     Types: Cigarettes     Start date: 1974     Quit date: 2005     Years since quittin.7    Smokeless tobacco: Never

## 2024-09-30 NOTE — H&P
Abel Reyes is a 69 y.o. male      CC:    [facility]    HISTORY OF PRESENT ILLNESS:    Patient is a 69-year-old male quite active and in good shape who complains of a bulge at the right inguinal area.  He had a left inguinal hernia in 2020 they did notice the right side but thought it was small enough that he did not want to do it at the same time.  He had a robotic left inguinal hernia done by Dr. Au 4 years ago.  Nuys get the bulge on the right side and he gets pinching pain that is very severe at times but only last a few minutes.    Reason for evaluation: rt inguinal hernia                   Patient complains of a bulge at the right inguinal hernia,   for  10 year(s). Patient  does remember a time they felt a popping sensation. States felt the popping sensation 1 mo ago- working outside .  Patient states it is  reducible.  Pt describes pain as 5 out of 10.  Aggrevating factors include bending over and reaching.       Patient denies any nausea, vomiting, constipation, difficulty urinating or a chronic cough.     Patient's work includes RETIRED- does a lot of outside house work.     Patient's hernia history includes lt inguinal hernia repair with mesh by Dr. Au 4 yrs ago, right hernia was noted but was not repaired.    Review of Systems:    General:  Fever: Negative  Weight Change:Negative  Night Sweats: Negative    Eye:  Blurry Vision:Negative  Double Vision: Negative    Ent:  Headaches: Negative  Sore throat: Negative    Allergy/Immunology:  Hives: Negative  Latex allergy: Negative    Hematology/Lymphatic:  Bleeding Problems: Negative  Blood Clots: Negative  Swollen Lymph Nodes: Negative    Lungs:  Cough: Negative  SOB: Negative  Wheezing:Negative    Cardiovascular:  Chest Pain: Negative  Palpitations:Negative    GI:   Decreased Appetite: Negative  Heartburn: Negative  Dysphagia: Negative  Nausea/Vomiting: Negative  Abdominal Pain: Negative  Change in Bowels:Negative  Constipation: Negative  Diarrhea:

## 2024-10-10 ENCOUNTER — OFFICE VISIT (OUTPATIENT)
Dept: SURGERY | Age: 69
End: 2024-10-10

## 2024-10-10 VITALS — TEMPERATURE: 98.1 F | RESPIRATION RATE: 16 BRPM | HEART RATE: 76 BPM | WEIGHT: 182.6 LBS | BODY MASS INDEX: 24.77 KG/M2

## 2024-10-10 DIAGNOSIS — Z48.89 POSTOPERATIVE VISIT: Primary | ICD-10-CM

## 2024-10-10 PROCEDURE — 99024 POSTOP FOLLOW-UP VISIT: CPT | Performed by: PHYSICIAN ASSISTANT

## 2024-10-10 NOTE — PROGRESS NOTES
Subjective   Abel Reyes is a 69 y.o. male who presents today for post operative visit after having right inguinal hernia done on 9/30/24 by Dr. Hernandez. Currently state pain is 0, and is using tylenol for pain if needed. Incision sites have no drainage or redness or swelling or tenderness around them. Patient states is having regular bowel movements.  Is eating and drinking without difficulty. Patient states has the following concerns/questions: Patient would like to know if hernias will occur again.  He had both inguinals done now with mesh, does lift a lot but is worried about going back to lifting again.    Pathology: n/a    Past Medical History:   Diagnosis Date    BPH (benign prostatic hypertrophy)     with outlet symptoms    Carpal tunnel syndrome, bilateral     Cervical disc disorder     Footdrop     Left    Herpes simplex labialis     episodic    Hyperlipidemia     Hypothyroidism     Mumps     Neck pain     Neuropathy     Paresthesias     Peripheral neuropathy     Pneumonia     history of    Prostate cancer (HCC)     no surgery; had radiation    Ulnar neuropathy of right upper extremity        Past Surgical History:   Procedure Laterality Date    CARPAL TUNNEL RELEASE Right 02/2014    also ulnar nerve r elbow     CATARACT REMOVAL Bilateral 2023    COLONOSCOPY  2009    COLONOSCOPY  08/28/2017    at [place]; normal    HERNIA REPAIR Right 9/30/2024    Laparoscopic robotic assisted right inguinal hernia repair with mesh performed by Kali Hernandez MD at [facility] OR    INGUINAL HERNIA REPAIR Left 09/14/2020    Laparoscopic Robotic Assisted Left Inguinal Hernia Repair performed by Jerrell Au DO at [facility] OR    MOUTH SURGERY      peridontal    OTHER SURGICAL HISTORY      IVIG INFUSIONS STARTING NOVEMBER OF 2015 AND EVERY 3 MONTH SINCE    TOE SURGERY         Current Outpatient Medications   Medication Sig Dispense Refill    rosuvastatin (CRESTOR) 10 MG tablet Take 1 tablet by mouth nightly (Patient not taking:

## 2025-01-17 ENCOUNTER — TELEPHONE (OUTPATIENT)
Dept: FAMILY MEDICINE CLINIC | Age: 70
End: 2025-01-17

## 2025-01-21 ENCOUNTER — OFFICE VISIT (OUTPATIENT)
Dept: FAMILY MEDICINE CLINIC | Age: 70
End: 2025-01-21
Payer: MEDICARE

## 2025-01-21 VITALS
WEIGHT: 182 LBS | OXYGEN SATURATION: 98 % | HEART RATE: 57 BPM | BODY MASS INDEX: 24.65 KG/M2 | DIASTOLIC BLOOD PRESSURE: 80 MMHG | HEIGHT: 72 IN | SYSTOLIC BLOOD PRESSURE: 115 MMHG

## 2025-01-21 DIAGNOSIS — G61.81 CIDP (CHRONIC INFLAMMATORY DEMYELINATING POLYNEUROPATHY) (HCC): ICD-10-CM

## 2025-01-21 DIAGNOSIS — C61 PROSTATE CANCER (HCC): ICD-10-CM

## 2025-01-21 DIAGNOSIS — Z86.79 H/O SINUS BRADYCARDIA: ICD-10-CM

## 2025-01-21 DIAGNOSIS — Z00.00 ANNUAL PHYSICAL EXAM: ICD-10-CM

## 2025-01-21 DIAGNOSIS — E78.00 HYPERCHOLESTEROLEMIA: ICD-10-CM

## 2025-01-21 PROCEDURE — 93005 ELECTROCARDIOGRAM TRACING: CPT | Performed by: FAMILY MEDICINE

## 2025-01-21 PROCEDURE — 99212 OFFICE O/P EST SF 10 MIN: CPT | Performed by: FAMILY MEDICINE

## 2025-01-21 SDOH — ECONOMIC STABILITY: FOOD INSECURITY: WITHIN THE PAST 12 MONTHS, YOU WORRIED THAT YOUR FOOD WOULD RUN OUT BEFORE YOU GOT MONEY TO BUY MORE.: NEVER TRUE

## 2025-01-21 SDOH — ECONOMIC STABILITY: FOOD INSECURITY: WITHIN THE PAST 12 MONTHS, THE FOOD YOU BOUGHT JUST DIDN'T LAST AND YOU DIDN'T HAVE MONEY TO GET MORE.: NEVER TRUE

## 2025-01-21 ASSESSMENT — PATIENT HEALTH QUESTIONNAIRE - PHQ9
2. FEELING DOWN, DEPRESSED OR HOPELESS: NOT AT ALL
SUM OF ALL RESPONSES TO PHQ9 QUESTIONS 1 & 2: 0
SUM OF ALL RESPONSES TO PHQ QUESTIONS 1-9: 0
1. LITTLE INTEREST OR PLEASURE IN DOING THINGS: NOT AT ALL

## 2025-01-21 NOTE — PROGRESS NOTES
HPI:  Patient comes in today for   Chief Complaint   Patient presents with    Annual Exam     Pt is here for a physical for part time job. Pt states that he needs a EKG updated. 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  Has h/o sinus bradycardia on prior EKG.  No prior h/o heart disease. Denies ant fatigue or shortness of breath.No Dizziness or palpitations .No back pain or joint pain.h/o BPH is on Flomax follows with urology.h/o CIDP follows with neurology gets infusions every 2 months    HISTORY:  Past Medical History:   Diagnosis Date    BPH (benign prostatic hypertrophy)     with outlet symptoms    Carpal tunnel syndrome, bilateral     Cervical disc disorder     Footdrop     Left    Herpes simplex labialis     episodic    Hyperlipidemia     Hypothyroidism     Mumps     Neck pain     Neuropathy     Paresthesias     Peripheral neuropathy     Pneumonia     history of    Prostate cancer (HCC)     no surgery; had radiation    Ulnar neuropathy of right upper extremity        Past Surgical History:   Procedure Laterality Date    CARPAL TUNNEL RELEASE Right 02/2014    also ulnar nerve r elbow     CATARACT REMOVAL Bilateral 2023    COLONOSCOPY  2009    COLONOSCOPY  08/28/2017    at [place]; normal    HERNIA REPAIR Right 9/30/2024    Laparoscopic robotic assisted right inguinal hernia repair with mesh performed by Kali Hernandez MD at [facility] OR    INGUINAL HERNIA REPAIR Left 09/14/2020    Laparoscopic Robotic Assisted Left Inguinal Hernia Repair performed by Jerrell Au DO at [facility] OR    MOUTH SURGERY      peridontal    OTHER SURGICAL HISTORY      IVIG INFUSIONS STARTING NOVEMBER OF 2015 AND EVERY 3 MONTH SINCE    TOE SURGERY          Family History   Problem Relation Age of Onset    Heart Disease Mother     Cancer Mother         lung    Cancer Father

## 2025-03-04 RX ORDER — ROSUVASTATIN CALCIUM 10 MG/1
10 TABLET, COATED ORAL NIGHTLY
Qty: 90 TABLET | Refills: 0 | Status: SHIPPED | OUTPATIENT
Start: 2025-03-04

## 2025-03-04 NOTE — TELEPHONE ENCOUNTER
Abel Reyse is requesting a refill on the following medication(s):  Requested Prescriptions     Pending Prescriptions Disp Refills    rosuvastatin (CRESTOR) 10 MG tablet [Pharmacy Med Name: ROSUVASTATIN 10MG TABLETS] 90 tablet      Sig: TAKE 1 TABLET BY MOUTH EVERY NIGHT       Last Visit Date (If Applicable):  1/21/2025    Next Visit Date:    7/21/2025

## 2025-05-27 RX ORDER — ROSUVASTATIN CALCIUM 10 MG/1
10 TABLET, COATED ORAL NIGHTLY
Qty: 90 TABLET | Refills: 0 | Status: SHIPPED | OUTPATIENT
Start: 2025-05-27

## 2025-05-27 NOTE — TELEPHONE ENCOUNTER
Abel Reyes is requesting a refill on the following medication(s):  Requested Prescriptions     Pending Prescriptions Disp Refills    rosuvastatin (CRESTOR) 10 MG tablet [Pharmacy Med Name: ROSUVASTATIN 10MG TABLETS] 90 tablet 0     Sig: TAKE 1 TABLET BY MOUTH EVERY NIGHT       Last Visit Date (If Applicable):  1/21/2025    Next Visit Date:    7/21/2025

## 2025-07-21 ENCOUNTER — OFFICE VISIT (OUTPATIENT)
Dept: FAMILY MEDICINE CLINIC | Age: 70
End: 2025-07-21
Payer: MEDICARE

## 2025-07-21 VITALS
BODY MASS INDEX: 24.79 KG/M2 | SYSTOLIC BLOOD PRESSURE: 115 MMHG | HEIGHT: 72 IN | HEART RATE: 50 BPM | WEIGHT: 183 LBS | DIASTOLIC BLOOD PRESSURE: 74 MMHG | OXYGEN SATURATION: 92 %

## 2025-07-21 DIAGNOSIS — N40.1 BENIGN PROSTATIC HYPERPLASIA WITH NOCTURIA: ICD-10-CM

## 2025-07-21 DIAGNOSIS — R35.1 BENIGN PROSTATIC HYPERPLASIA WITH NOCTURIA: ICD-10-CM

## 2025-07-21 DIAGNOSIS — E78.00 HYPERCHOLESTEROLEMIA: Primary | ICD-10-CM

## 2025-07-21 DIAGNOSIS — Z12.5 PROSTATE CANCER SCREENING: ICD-10-CM

## 2025-07-21 DIAGNOSIS — G61.81 CIDP (CHRONIC INFLAMMATORY DEMYELINATING POLYNEUROPATHY) (HCC): ICD-10-CM

## 2025-07-21 DIAGNOSIS — Z13.21 ENCOUNTER FOR VITAMIN DEFICIENCY SCREENING: ICD-10-CM

## 2025-07-21 DIAGNOSIS — F17.210 SMOKING GREATER THAN 25 PACK YEARS: ICD-10-CM

## 2025-07-21 DIAGNOSIS — Z86.79 H/O SINUS BRADYCARDIA: ICD-10-CM

## 2025-07-21 PROCEDURE — 1160F RVW MEDS BY RX/DR IN RCRD: CPT | Performed by: FAMILY MEDICINE

## 2025-07-21 PROCEDURE — 3017F COLORECTAL CA SCREEN DOC REV: CPT | Performed by: FAMILY MEDICINE

## 2025-07-21 PROCEDURE — 1123F ACP DISCUSS/DSCN MKR DOCD: CPT | Performed by: FAMILY MEDICINE

## 2025-07-21 PROCEDURE — 99214 OFFICE O/P EST MOD 30 MIN: CPT | Performed by: FAMILY MEDICINE

## 2025-07-21 PROCEDURE — 1159F MED LIST DOCD IN RCRD: CPT | Performed by: FAMILY MEDICINE

## 2025-07-21 PROCEDURE — 99211 OFF/OP EST MAY X REQ PHY/QHP: CPT | Performed by: FAMILY MEDICINE

## 2025-07-21 PROCEDURE — G8427 DOCREV CUR MEDS BY ELIG CLIN: HCPCS | Performed by: FAMILY MEDICINE

## 2025-07-21 PROCEDURE — G8420 CALC BMI NORM PARAMETERS: HCPCS | Performed by: FAMILY MEDICINE

## 2025-07-21 PROCEDURE — 1036F TOBACCO NON-USER: CPT | Performed by: FAMILY MEDICINE

## 2025-07-21 RX ORDER — OMEGA-3-ACID ETHYL ESTERS 1 G/1
1000 CAPSULE, LIQUID FILLED ORAL
COMMUNITY

## 2025-07-24 ENCOUNTER — HOSPITAL ENCOUNTER (OUTPATIENT)
Age: 70
Discharge: HOME | End: 2025-07-24
Payer: MEDICARE

## 2025-07-24 DIAGNOSIS — C61 PROSTATE CANCER (HCC): ICD-10-CM

## 2025-07-24 LAB — PSA SERPL-MCNC: 0.55 NG/ML (ref 0–4)

## 2025-07-24 PROCEDURE — 36415 COLL VENOUS BLD VENIPUNCTURE: CPT

## 2025-07-24 PROCEDURE — 84153 ASSAY OF PSA TOTAL: CPT

## 2025-07-25 ENCOUNTER — TELEPHONE (OUTPATIENT)
Dept: FAMILY MEDICINE CLINIC | Age: 70
End: 2025-07-25

## 2025-07-25 NOTE — TELEPHONE ENCOUNTER
Torri called from [facility] radiology and states insurance will not pay for screen because Nick quit smoking over 20 years.  Insurance only pays if they have quit 15 years or less.

## 2025-07-31 ENCOUNTER — HOSPITAL ENCOUNTER (OUTPATIENT)
Dept: RADIATION ONCOLOGY | Age: 70
Discharge: HOME | End: 2025-07-31
Payer: MEDICARE

## 2025-07-31 VITALS
SYSTOLIC BLOOD PRESSURE: 113 MMHG | BODY MASS INDEX: 26.28 KG/M2 | OXYGEN SATURATION: 97 % | DIASTOLIC BLOOD PRESSURE: 59 MMHG | HEART RATE: 61 BPM | RESPIRATION RATE: 16 BRPM | TEMPERATURE: 97.9 F | WEIGHT: 193.8 LBS

## 2025-07-31 DIAGNOSIS — C61 PROSTATE CANCER (HCC): Primary | ICD-10-CM

## 2025-07-31 PROCEDURE — 99212 OFFICE O/P EST SF 10 MIN: CPT | Performed by: RADIOLOGY

## 2025-07-31 NOTE — PROGRESS NOTES
Abel Reyes  7/31/2025  9:40 AM      Vitals:    07/31/25 0932   BP: (!) 113/59   Pulse: 61   Resp: 16   Temp: 97.9 °F (36.6 °C)   SpO2: 97%    :     Pain Assessment: None - Denies Pain          Wt Readings from Last 1 Encounters:   07/31/25 87.9 kg (193 lb 12.8 oz)                Current Outpatient Medications:     omega-3 acid ethyl esters (LOVAZA) 1 g capsule, Take 1 capsule by mouth, Disp: , Rfl:     rosuvastatin (CRESTOR) 10 MG tablet, TAKE 1 TABLET BY MOUTH EVERY NIGHT, Disp: 90 tablet, Rfl: 0    tamsulosin (FLOMAX) 0.4 MG capsule, Take 1 capsule by mouth daily, Disp: , Rfl:     PRIVIGEN 20 GM/200ML SOLN solution, , Disp: , Rfl:     b complex vitamins capsule, Take 1 capsule by mouth daily, Disp: , Rfl:     Ascorbic Acid (VITAMIN C) 250 MG tablet, Take 2 tablets by mouth daily, Disp: , Rfl:     Omega-3 Fatty Acids (FISH OIL) 1000 MG CAPS, Take 1 capsule by mouth daily, Disp: , Rfl:     Vitamin D (CHOLECALCIFEROL) 1000 UNITS CAPS capsule, Take 1 capsule by mouth daily, Disp: , Rfl:     vitamin B-12 (CYANOCOBALAMIN) 1000 MCG tablet, Take 1 tablet by mouth daily, Disp: , Rfl:                FALLS RISK SCREEN  Instructions:  Assess the patient and enter the appropriate indicators that are present for fall risk identification.   Total the numbers entered and assign a fall risk score from Table 2.  Reassess patient at a minimum every 12 weeks or with status change.    Assessment   Date  7/31/2025     1.  Mental Ability: confusion/cognitively impaired 0     2.  Elimination Issues: incontinence, frequency 3       3.  Ambulatory: use of assistive devices (walker, cane, off-loading devices),        attached to equipment (IV pole, oxygen) 0     4.  Sensory Limitations: dizziness, vertigo, impaired vision 0     5.  Age less than 65        0     6.  Age 65 or greater 1     7.  Medication: diuretics, strong analgesics, hypnotics, sedatives,        antihypertensive agents 0   8.  Falls:  recent history of falls within

## 2025-07-31 NOTE — PROGRESS NOTES
[facility]            Radiation Oncology          [id number] [place]          [place] [address]        O: [phone]        F: [phone]       [email or web address]           Date of Service: 2025     Location:  [facility] Radiation Oncology,   [address] [place] [id number] [phone]       RADIATION ONCOLOGY FOLLOW UP NOTE    Patient ID:   Abel Reyes  : 1955   MRN: [id number]    DIAGNOSIS:  Cancer Staging   Prostate cancer (HCC)  Staging form: Prostate, AJCC 8th Edition  - Clinical stage from 2022: Stage IIB (cT1c, cN0, cM0, PSA: 4.8, Grade Group: 2) - Signed by Michael Triana MD on 2022    INTERVAL HISTORY:   Abel Reyes is a 70 y.o.. male with a history of a febrile intermediate risk prostate cancer for which he initially presented to urology for surgery though eventually elected to give radiation treatment.  Patient completed his course of radiation therapy in 2023.  He presents today for a routine yearly follow-up.  He is doing well.  He reports moderate LUTS.  He is on Flomax, urinary symptoms at baseline.  Denies hematuria or hematochezia.  Denies changes in bowel habits.  PSA remains low.    MEDICATIONS:    Current Outpatient Medications:     omega-3 acid ethyl esters (LOVAZA) 1 g capsule, Take 1 capsule by mouth, Disp: , Rfl:     rosuvastatin (CRESTOR) 10 MG tablet, TAKE 1 TABLET BY MOUTH EVERY NIGHT, Disp: 90 tablet, Rfl: 0    tamsulosin (FLOMAX) 0.4 MG capsule, Take 1 capsule by mouth daily, Disp: , Rfl:     PRIVIGEN 20 GM/200ML SOLN solution, , Disp: , Rfl:     b complex vitamins capsule, Take 1 capsule by mouth daily, Disp: , Rfl:     Ascorbic Acid (VITAMIN C) 250 MG tablet, Take 2 tablets by mouth daily, Disp: , Rfl:     Omega-3 Fatty Acids (FISH OIL) 1000 MG CAPS, Take 1 capsule by mouth daily, Disp: , Rfl:     Vitamin D (CHOLECALCIFEROL) 1000 UNITS CAPS capsule, Take 1 capsule by mouth daily, Disp:

## (undated) DEVICE — GLOVE SURG SZ 7 L12IN FNGR THK79MIL GRN LTX FREE

## (undated) DEVICE — TIP COVER ACCESSORY

## (undated) DEVICE — DRAPE,UTILITY,TAPE,15X26,STERILE: Brand: MEDLINE

## (undated) DEVICE — MASTISOL ADHESIVE LIQ 2/3ML

## (undated) DEVICE — GAUZE,SPONGE,2"X2",8PLY,STERILE,LF,2'S: Brand: MEDLINE

## (undated) DEVICE — SUTURE MCRYL SZ 4-0 L18IN ABSRB UD L16MM PC-3 3/8 CIR PRIM Y845G

## (undated) DEVICE — SUTURE V-LOC 90 3-0 L9IN ABSRB VLT L26MM V-20 1/2 CIR TAPR VLOCM0644

## (undated) DEVICE — SUTURE VICRYL SZ 2-0 L27IN ABSRB VLT L26MM SH 1/2 CIR J317H

## (undated) DEVICE — GLOVE ORANGE PI 7   MSG9070

## (undated) DEVICE — CHLORAPREP 26ML ORANGE

## (undated) DEVICE — SUTURE VCRL SZ 3-0 L27IN ABSRB UD L26MM SH 1/2 CIR J416H

## (undated) DEVICE — ARM DRAPE

## (undated) DEVICE — TROCAR: Brand: KII FIOS FIRST ENTRY

## (undated) DEVICE — MDHZ GEN LAPAROSCOPY&ROBOT: Brand: MEDLINE INDUSTRIES, INC.

## (undated) DEVICE — GLOVE ORANGE PI 8   MSG9080

## (undated) DEVICE — GENERAL ROBOTIC PACK: Brand: MEDLINE INDUSTRIES, INC.

## (undated) DEVICE — CANNULA SEAL

## (undated) DEVICE — SKIN AFFIX SURG ADHESIVE 72/CS 0.55ML: Brand: MEDLINE

## (undated) DEVICE — PROCEDURE PACK TRENGUARD 450

## (undated) DEVICE — SUTURE MONOCRYL SZ 4-0 L18IN ABSRB UD L19MM PS-2 3/8 CIR PRIM Y496G

## (undated) DEVICE — BLADELESS OBTURATOR: Brand: WECK VISTA

## (undated) DEVICE — GLOVE ORANGE PI 7 1/2   MSG9075

## (undated) DEVICE — Device

## (undated) DEVICE — PACK SURG PROC REMINDER N WVN DISPOSABLE BEAC TIME OUT

## (undated) DEVICE — ANCHOR TISSUE RETRIEVAL SYSTEM, BAG SIZE 125 ML, PORT SIZE 8 MM: Brand: ANCHOR TISSUE RETRIEVAL SYSTEM

## (undated) DEVICE — TOTAL TRAY, DB, 100% SILI FOLEY, 16FR 10: Brand: MEDLINE

## (undated) DEVICE — STRIP,CLOSURE,WOUND,MEDI-STRIP,1/2X4: Brand: MEDLINE

## (undated) DEVICE — DRESSING TRNSPAR W2XL2.75IN FLM SHT SEMIPERMEABLE WIND

## (undated) DEVICE — POSITIONER HD W8XH4XL8.5IN RASPBERRY FOAM SLT

## (undated) DEVICE — SOLUTION IV IRRIG POUR BRL 0.9% SODIUM CHL 2F7124

## (undated) DEVICE — SOLUTION ANTIFOG VIS SYS CLEARIFY LAPSCP

## (undated) DEVICE — GARMENT,MEDLINE,DVT,INT,CALF,MED, GEN2: Brand: MEDLINE

## (undated) DEVICE — INSUFFLATION TUBING SET, ENDOFLATOR 50: Brand: N.A.

## (undated) DEVICE — INSUFFLATION NEEDLE TO ESTABLISH PNEUMOPERITONEUM.: Brand: INSUFFLATION NEEDLE

## (undated) DEVICE — BLANKET WRM W29.9XL79.1IN UP BODY FORC AIR MISTRAL-AIR

## (undated) DEVICE — SEAL